# Patient Record
Sex: FEMALE | Race: WHITE | NOT HISPANIC OR LATINO | Employment: FULL TIME | URBAN - METROPOLITAN AREA
[De-identification: names, ages, dates, MRNs, and addresses within clinical notes are randomized per-mention and may not be internally consistent; named-entity substitution may affect disease eponyms.]

---

## 2017-01-08 ENCOUNTER — MYC REFILL (OUTPATIENT)
Dept: OBGYN | Facility: CLINIC | Age: 32
End: 2017-01-08

## 2017-01-08 DIAGNOSIS — E03.9 HYPOTHYROIDISM: ICD-10-CM

## 2017-01-09 RX ORDER — LEVOTHYROXINE SODIUM 50 UG/1
50 TABLET ORAL DAILY
Qty: 30 TABLET | Refills: 0 | Status: SHIPPED | OUTPATIENT
Start: 2017-01-09 | End: 2017-12-21

## 2017-01-09 NOTE — TELEPHONE ENCOUNTER
Message from VIDDIXhart:  Original authorizing provider: Estefani Robles MD    Sigrid Johnathan would like a refill of the following medications:  levothyroxine (SYNTHROID/LEVOTHROID) 50 MCG tablet [Estefani Robles MD]    Preferred pharmacy: [facility] [address] [place] [address] [address] AT [facility] [address]    Comment:  Hello! I ran out of my prescription for levothyroxine and Waleen contacted you on Saturday for a prescription request. If I could have this approved ASAP that would be much appreciated. I ran out of my prescription this morning. Thank you!.

## 2017-08-26 ENCOUNTER — HEALTH MAINTENANCE LETTER (OUTPATIENT)
Age: 32
End: 2017-08-26

## 2017-10-04 ENCOUNTER — OFFICE VISIT (OUTPATIENT)
Dept: URGENT CARE | Facility: URGENT CARE | Age: 32
End: 2017-10-04
Payer: COMMERCIAL

## 2017-10-04 VITALS
OXYGEN SATURATION: 99 % | TEMPERATURE: 101.3 F | WEIGHT: 168.06 LBS | HEART RATE: 101 BPM | DIASTOLIC BLOOD PRESSURE: 87 MMHG | SYSTOLIC BLOOD PRESSURE: 135 MMHG | BODY MASS INDEX: 28.4 KG/M2

## 2017-10-04 DIAGNOSIS — D68.9 CLOTTING DISORDER (H): ICD-10-CM

## 2017-10-04 DIAGNOSIS — D68.2 FACTOR II DEFICIENCY (H): ICD-10-CM

## 2017-10-04 DIAGNOSIS — R20.2 ARM PARESTHESIA, LEFT: ICD-10-CM

## 2017-10-04 DIAGNOSIS — M79.662 PAIN OF LEFT LOWER LEG: Primary | ICD-10-CM

## 2017-10-04 LAB
D DIMER PPP FEU-MCNC: 0.2 UG/ML FEU (ref 0–0.5)
ERYTHROCYTE [SEDIMENTATION RATE] IN BLOOD BY WESTERGREN METHOD: 10 MM/H (ref 0–20)

## 2017-10-04 PROCEDURE — 85379 FIBRIN DEGRADATION QUANT: CPT | Performed by: FAMILY MEDICINE

## 2017-10-04 PROCEDURE — 85652 RBC SED RATE AUTOMATED: CPT | Performed by: FAMILY MEDICINE

## 2017-10-04 PROCEDURE — 93000 ELECTROCARDIOGRAM COMPLETE: CPT | Performed by: FAMILY MEDICINE

## 2017-10-04 PROCEDURE — 36415 COLL VENOUS BLD VENIPUNCTURE: CPT | Performed by: FAMILY MEDICINE

## 2017-10-04 PROCEDURE — 99214 OFFICE O/P EST MOD 30 MIN: CPT | Performed by: FAMILY MEDICINE

## 2017-10-04 NOTE — NURSING NOTE
"Chief Complaint   Patient presents with     Tingling     tingling of left arm since yesterday aftenoon - tingling of left leg since Saturday night. Denies chest pain and currently no other symptoms.        Initial /87  Pulse 101  Temp 101.3  F (38.5  C) (Oral)  Wt 168 lb 1 oz (76.2 kg)  SpO2 99%  BMI 28.4 kg/m2 Estimated body mass index is 28.4 kg/(m^2) as calculated from the following:    Height as of 9/12/16: 5' 4.5\" (1.638 m).    Weight as of this encounter: 168 lb 1 oz (76.2 kg).  Medication Reconciliation: complete    "

## 2017-10-04 NOTE — MR AVS SNAPSHOT
After Visit Summary   10/4/2017    Sigrid Mann    MRN: [id number]           Patient Information     Date Of Birth          1985        Visit Information        Provider Department      10/4/2017 2:30 PM Jj Ponce,  [facility]        Today's Diagnoses     Pain of left lower leg    -  1    Arm paresthesia, left        Clotting disorder (H)        Factor II deficiency (H)           Follow-ups after your visit        Who to contact     If you have questions or need follow up information about today's clinic visit or your schedule please contact [facility] directly at [phone].  Normal or non-critical lab and imaging results will be communicated to you by Nextthart, letter or phone within 4 business days after the clinic has received the results. If you do not hear from us within 7 days, please contact the clinic through Nextthart or phone. If you have a critical or abnormal lab result, we will notify you by phone as soon as possible.  Submit refill requests through NetScaler or call your pharmacy and they will forward the refill request to us. Please allow 3 business days for your refill to be completed.          Additional Information About Your Visit        MyChart Information     NetScaler gives you secure access to your electronic health record. If you see a primary care provider, you can also send messages to your care team and make appointments. If you have questions, please call your primary care clinic.  If you do not have a primary care provider, please call [phone] and they will assist you.        Care EveryWhere ID     This is your Care EveryWhere ID. This could be used by other organizations to access your [facility] medical records  [id number]        Your Vitals Were     Pulse Temperature Pulse Oximetry BMI (Body Mass Index)          101 101.3  F (38.5  C) (Oral) 99% 28.4 kg/m2         Blood Pressure from Last 3 Encounters:    10/04/17 135/87   09/12/16 112/66   01/23/15 116/78    Weight from Last 3 Encounters:   10/04/17 168 lb 1 oz (76.2 kg)   09/12/16 170 lb (77.1 kg)   01/23/15 160 lb (72.6 kg)              We Performed the Following     D dimer quantitative     EKG 12-lead complete w/read - Clinics     ESR: Erythrocyte sedimentation rate          Today's Medication Changes          These changes are accurate as of: 10/4/17  4:23 PM.  If you have any questions, ask your nurse or doctor.               These medicines have changed or have updated prescriptions.        Dose/Directions    levothyroxine 50 MCG tablet   Commonly known as:  SYNTHROID/LEVOTHROID   This may have changed:  Another medication with the same name was removed. Continue taking this medication, and follow the directions you see here.   Used for:  Hypothyroidism   Changed by:  Estefani Robles MD        Dose:  50 mcg   Take 1 tablet (50 mcg) by mouth daily   Quantity:  30 tablet   Refills:  0                Primary Care Provider Office Phone # Fax #    Jj Ministerio Joseph [phone]       [address]  [facility] [address]        Equal Access to Services     [facility]: Hadii aad ku hadasho Soomaali, waaxda luqadaha, qaybta kaalmada adeegyada, waxay kanwalin haydouglas pacheco. So [facility] [phone].    ATENCIÓN: Si habla español, tiene a celis disposición servicios gratuitos de asistencia lingüística. Llame al [phone].    We comply with applicable federal civil rights laws and [place] laws. We do not discriminate on the basis of race, color, national origin, age, disability, sex, sexual orientation, or gender identity.            Thank you!     Thank you for choosing [facility]  for your care. Our goal is always to provide you with excellent care. Hearing back from our patients is one way we can continue to improve our services. Please take a few minutes to complete the written  survey that you may receive in the mail after your visit with us. Thank you!             Your Updated Medication List - Protect others around you: Learn how to safely use, store and throw away your medicines at www.disposemymeds.org.          This list is accurate as of: 10/4/17  4:23 PM.  Always use your most recent med list.                   Brand Name Dispense Instructions for use Diagnosis    levothyroxine 50 MCG tablet    SYNTHROID/LEVOTHROID    30 tablet    Take 1 tablet (50 mcg) by mouth daily    Hypothyroidism

## 2017-10-13 NOTE — PROGRESS NOTES
SUBJECTIVE: Sigrid Mann is a 32 year old female presenting with a chief complaint of left leg pain and left arm tingling.  Onset of symptoms was day(s) ago.  Course of illness is improving.    Severity moderate  Current and Associated symptoms: none  Treatment measures tried include None tried.  Predisposing factors include clotting disorder.    Past Medical History:   Diagnosis Date     Cervical dysplasia 2010    pt had LSILpap 12/10. colpo with 8 oclock bx showed just inflammation. another LSIL 3/11 with 12 oclock bx showed...     Herpes genitalis      HSV-1 infection      No Known Allergies  Social History   Substance Use Topics     Smoking status: Never Smoker     Smokeless tobacco: Never Used     Alcohol use 0.0 oz/week     0 Standard drinks or equivalent per week       ROS:  SKIN: no rash  GI: no vomiting    OBJECTIVE:  /87  Pulse 101  Temp 101.3  F (38.5  C) (Oral)  Wt 168 lb 1 oz (76.2 kg)  SpO2 99%  BMI 28.4 kg/[id number] APPEARANCE: healthy, alert and no distress  EYES: EOMI,  PERRL, conjunctiva clear  HENT: ear canals and TM's normal.  Nose and mouth without ulcers, erythema or lesions  NECK: supple, nontender, no lymphadenopathy  RESP: lungs clear to auscultation - no rales, rhonchi or wheezes  CV: regular rates and rhythm, normal S1 S2, no murmur noted  ABDOMEN:  soft, nontender, no HSM or masses and bowel sounds normal  NEURO: Normal strength and tone, sensory exam grossly normal,  normal speech and mentation  SKIN: no suspicious lesions or rashes      ICD-10-CM    1. Pain of left lower leg M79.662 D dimer quantitative     ESR: Erythrocyte sedimentation rate   2. Arm paresthesia, left R20.2 EKG 12-lead complete w/read - Clinics     ESR: Erythrocyte sedimentation rate   3. Clotting disorder (H) D68.9 D dimer quantitative     ESR: Erythrocyte sedimentation rate   4. Factor II deficiency (H) D68.2 D dimer quantitative     ESR: Erythrocyte sedimentation rate       Fluids/Rest, f/u if worse/not any  better

## 2017-11-13 DIAGNOSIS — E03.9 HYPOTHYROIDISM, UNSPECIFIED TYPE: ICD-10-CM

## 2017-11-13 RX ORDER — LEVOTHYROXINE SODIUM 50 UG/1
TABLET ORAL
Qty: 30 TABLET | Refills: 0 | Status: SHIPPED | OUTPATIENT
Start: 2017-11-13 | End: 2018-01-08

## 2017-11-13 NOTE — TELEPHONE ENCOUNTER
Levothyroxine 50mcg      Last Written Prescription Date:  19/17  Last Fill Quantity: 30,   # refills: 0  Last Office Visit with [facility] primary care provider:  9/12/16  Future Office visit: none    Medication is being filled for 1 time refill only due to:  Patient needs to be seen because it has been more than one year since last visit.   Pt due for annual, no appt scheduled. One month extension sent per [place] protocol.

## 2017-11-20 ENCOUNTER — TELEPHONE (OUTPATIENT)
Dept: NURSING | Facility: CLINIC | Age: 32
End: 2017-11-20

## 2017-11-20 DIAGNOSIS — D68.4 BLOOD CLOTTING FACTOR DEFICIENCY DISORDER (H): Primary | ICD-10-CM

## 2017-11-20 NOTE — TELEPHONE ENCOUNTER
What is a factor 2 gene mutation? A prothrombin 2 gene mutation as in higher risk for blood clots or a factor 2 in the blood clotting cascade?  We obviously always recommend folate, even pre preg. I would say to for sure do at least a PNV and to make sure she's getting 1000mcg of folate. The pnv will have 800mcg and then can get additional 400mcg and take that for 1200mcg. Who told her about folate? Was it us? Heme? Mfm? Where was the factor testing done? When?  Trying to find out what exactly she has

## 2017-11-20 NOTE — TELEPHONE ENCOUNTER
LMP 10/9/17, positive UPT 2 days ago. Pt has factor 2 gene mutation, [id number]. Pt was told she needed to start folic acid supplement if she became pregnant. Routing to Dr. Robles. Please advise.

## 2017-11-21 NOTE — TELEPHONE ENCOUNTER
Pt had lab work done on 3/1/13 through this office.   Negative for Factor 5 Leiden mutation  Positive for Prothrombin (Factor II) Pt has a 2-4 fold increase in the risk of venous thrombosis.     Tried to reach pt. Not able to . Will try later.

## 2017-11-21 NOTE — TELEPHONE ENCOUNTER
Ok so based on my research she probably could benefit from 4000mcg of folate rather than the 1000mcg we recommend. Some say that MTHFR mutations make absorption of folic acid harder than a different type but by taking more this should compensate.    In terms of blood clotting risk in pregnancy is there a record if she's heterozygous for prothrombin 2 or double heterozygous? No access to kim. If she's just heterozygous she doesn't need any lovenox and can just be monitored if she's never had a blood clot before. Has she? Don't recall that she has. If she is double hetero than regardless of her VTE risk we need to start lovenox. Could you work on getting me that info and let me know.    We should send a referral to [facility] to discuss some of this. B/c of the MTHFR and the prothrombin they may bump up her baseline risk and rec. lovenox but each one individually doesn't need lovenox and I'd like them to make that decision. She should still do a NOB with us but let's have her see them around that same time.

## 2017-11-21 NOTE — TELEPHONE ENCOUNTER
[facility] states   Prothrombin (Factor II) 24212AF. Mutation : Positive for one copy of the [id number] mutation.  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 Heterozygous has a 2-4 fold increase in the risk of venous thrombosis. Presence of the mutation is associated with an elevation of prothrombin levels to about 30% above normal in heterozygous.   [facility] referral entered. Should copy of lab work be scanned into epic or sent to [facility] for reference?  Rx for Folate entered. Need to know pharmacy. LMTCB to discuss if had blood clot before

## 2017-11-21 NOTE — TELEPHONE ENCOUNTER
Pt informed.   Pt had lab work done on 3/1/13 through this office.   Negative for Factor 5 Leiden mutation  Methylenetetrahydrofolate Reductase DNA: Positive for two copies of the [id number] mutation  Positive for Prothrombin (Factor II) Pt has a 2-4 fold increase in the risk of venous thrombosis. Routing to Dr. Margaret STROUD

## 2017-11-21 NOTE — TELEPHONE ENCOUNTER
Reason for Call:  Other call back    Detailed comments: Patient returning Triage phone call    Phone Number Patient can be reached at: Cell number on file:    Telephone Information:   Mobile [phone]       Best Time: today    Can we leave a detailed message on this number? YES    Call taken on 11/21/2017 at 10:12 AM by Corine Avilez

## 2017-11-22 ENCOUNTER — TELEPHONE (OUTPATIENT)
Dept: MATERNAL FETAL MEDICINE | Facility: CLINIC | Age: 32
End: 2017-11-22

## 2017-11-22 RX ORDER — FOLIC ACID 1 MG/1
4000 TABLET ORAL DAILY
Qty: 100 TABLET | Refills: 3 | Status: SHIPPED | OUTPATIENT
Start: 2017-11-22 | End: 2018-03-28

## 2017-11-22 NOTE — TELEPHONE ENCOUNTER
Pt returned call. Pt informed of message below. Pt states she has not had blood clots. Pt was seen in urgent care  on 10/4/17 for Left leg and Left arm numbness. After EKG and labs was told it was pinched nerve. No other episodes of numbness. Pt also stated that last couple of cycles were very heavy with clots the size of silver dollars. Passed about 3-5 per cycle. LMP 10/9/17. Pt has 1st OB on 12/11/17 with Kalie. MFM referral sent. Rx for folate sent. Dr. Robles appointment on 1/8/18. Routing to Dr. Robles. Please review.

## 2017-11-22 NOTE — TELEPHONE ENCOUNTER
Yes, please send copy of blood work to [facility] so they have it and please actually print it to scan into her epic chart so I have it on file.  Since she's hetero w/ no clots she's considered low risk and may just need a baby asa if anything at all but will let M determine that for sure.  Normally I would say this isn't an approp NOB for Kalie given the high risk condition. However if we can get the M consult as soon as viability confirmed then I'm ok leaving it as is. Can we just make sure the appointment gets scheduled as soon as possible after her NOB so it doesn't fall through the cracks?  thx

## 2017-11-22 NOTE — TELEPHONE ENCOUNTER
Copy of blood work sent to [facility] with referral copy. Copy sent to be scanned to lab tab per Dr. Robles.

## 2017-11-22 NOTE — TELEPHONE ENCOUNTER
Phone call to pt re need for labs and records. Pt states she was seen about 4 years ago at her PCP and had labs done for Factor 2 and Prothrombin mutations due to a family hx of. Pt states she has not had any clots nor has she had any family members that have had clots. Call made to pt Clinic to obtain records from 3/2013 labs that we done. Clinic states they will fax. Pt aware MFM will call to schedule her once we have all of the records we are looking for. States her first OBV is not until Dec. With Dr. Robles. No further questions at this time. Shellie Hemphill RN

## 2017-12-14 ENCOUNTER — PRENATAL OFFICE VISIT (OUTPATIENT)
Dept: OBGYN | Facility: CLINIC | Age: 32
End: 2017-12-14
Payer: COMMERCIAL

## 2017-12-14 ENCOUNTER — RADIANT APPOINTMENT (OUTPATIENT)
Dept: ULTRASOUND IMAGING | Facility: CLINIC | Age: 32
End: 2017-12-14
Payer: COMMERCIAL

## 2017-12-14 VITALS
WEIGHT: 173.2 LBS | HEART RATE: 93 BPM | HEIGHT: 64 IN | DIASTOLIC BLOOD PRESSURE: 73 MMHG | SYSTOLIC BLOOD PRESSURE: 116 MMHG | BODY MASS INDEX: 29.57 KG/M2

## 2017-12-14 DIAGNOSIS — Z34.01 ENCOUNTER FOR SUPERVISION OF NORMAL FIRST PREGNANCY IN FIRST TRIMESTER: Primary | ICD-10-CM

## 2017-12-14 DIAGNOSIS — Z34.91 VIABLE PREGNANCY IN FIRST TRIMESTER: ICD-10-CM

## 2017-12-14 PROBLEM — Z34.00 SUPERVISION OF NORMAL IUP (INTRAUTERINE PREGNANCY) IN PRIMIGRAVIDA: Status: ACTIVE | Noted: 2017-12-14

## 2017-12-14 LAB
ALBUMIN UR-MCNC: NEGATIVE MG/DL
APPEARANCE UR: CLEAR
BACTERIA #/AREA URNS HPF: ABNORMAL /HPF
BILIRUB UR QL STRIP: NEGATIVE
COLOR UR AUTO: YELLOW
GLUCOSE UR STRIP-MCNC: NEGATIVE MG/DL
HGB UR QL STRIP: NEGATIVE
KETONES UR STRIP-MCNC: ABNORMAL MG/DL
LEUKOCYTE ESTERASE UR QL STRIP: NEGATIVE
MUCOUS THREADS #/AREA URNS LPF: PRESENT /LPF
NITRATE UR QL: NEGATIVE
NON-SQ EPI CELLS #/AREA URNS LPF: ABNORMAL /LPF
PH UR STRIP: 6 PH (ref 5–7)
RBC #/AREA URNS AUTO: ABNORMAL /HPF
SOURCE: ABNORMAL
SP GR UR STRIP: 1.02 (ref 1–1.03)
UROBILINOGEN UR STRIP-ACNC: 0.2 EU/DL (ref 0.2–1)
WBC #/AREA URNS AUTO: ABNORMAL /HPF

## 2017-12-14 PROCEDURE — 99207 ZZC FIRST OB VISIT: CPT | Performed by: NURSE PRACTITIONER

## 2017-12-14 PROCEDURE — 76817 TRANSVAGINAL US OBSTETRIC: CPT | Performed by: OBSTETRICS & GYNECOLOGY

## 2017-12-14 PROCEDURE — 81001 URINALYSIS AUTO W/SCOPE: CPT | Performed by: NURSE PRACTITIONER

## 2017-12-14 PROCEDURE — 87086 URINE CULTURE/COLONY COUNT: CPT | Performed by: NURSE PRACTITIONER

## 2017-12-14 ASSESSMENT — PATIENT HEALTH QUESTIONNAIRE - PHQ9
5. POOR APPETITE OR OVEREATING: NOT AT ALL
SUM OF ALL RESPONSES TO PHQ QUESTIONS 1-9: 5

## 2017-12-14 ASSESSMENT — ANXIETY QUESTIONNAIRES
1. FEELING NERVOUS, ANXIOUS, OR ON EDGE: SEVERAL DAYS
5. BEING SO RESTLESS THAT IT IS HARD TO SIT STILL: NOT AT ALL
7. FEELING AFRAID AS IF SOMETHING AWFUL MIGHT HAPPEN: SEVERAL DAYS
GAD7 TOTAL SCORE: 5
3. WORRYING TOO MUCH ABOUT DIFFERENT THINGS: SEVERAL DAYS
6. BECOMING EASILY ANNOYED OR IRRITABLE: SEVERAL DAYS
2. NOT BEING ABLE TO STOP OR CONTROL WORRYING: SEVERAL DAYS

## 2017-12-14 NOTE — PROGRESS NOTES
This is a 32 year old female patient,   who presents for her first obstetrical visit.    Patient's last menstrual period was 10/09/2017..  This gives her an EDC of 2018 .  She is 9w3d weeks.  Her cycles are regular.  Her last menstrual period was normal.  She has had an ultrasound on Dec 15,2017 which showed 8w1d. .  Since her LMP, she has experienced  nausea, fatigue and breast tenderness).  She denies abdominal pain, headache, loss of appetite, vaginal discharge, dysuria, pelvic pain, urinary urgency, lightheadedness, urinary frequency, vaginal bleeding, hemorrhoids and constipation.        Past History:  Her past medical history   Past Medical History:   Diagnosis Date     Cervical dysplasia     pt had LSILpap 12/10. colpo with 8 oclock bx showed just inflammation. another LSIL 3/11 with 12 oclock bx showed...     Herpes genitalis      HSV-1 infection    .      This is her first pregnancy    Since her last LMP she denies use of alcohol, tobacco and street drugs.    HISTORY:  Obstetric History       T0      L0     SAB0   TAB0   Ectopic0   Multiple0   Live Births0       # Outcome Date GA Lbr Arnie/2nd Weight Sex Delivery Anes PTL Lv   1 Current                 Past Medical History:   Diagnosis Date     Cervical dysplasia     pt had LSILpap 12/10. colpo with 8 oclock bx showed just inflammation. another LSIL 3/11 with 12 oclock bx showed...     Herpes genitalis      HSV-1 infection      Past Surgical History:   Procedure Laterality Date     CYSTOSCOPY      cystoscopy, normal     Family History   Problem Relation Age of Onset     Breast Cancer Paternal Aunt      CEREBROVASCULAR DISEASE Paternal Grandmother      Hyperlipidemia Father      Hypertension Father 60     Coronary Artery Disease Father 60     summer 2014     Social History     Social History     Marital status: Single     Spouse name: Flaco     Number of children: 0     Years of education: N/A     Occupational  "History      [employer]     since 2/12 working for [employer]. 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     Social History Main Topics     Smoking status: Never Smoker     Smokeless tobacco: Never Used     Alcohol use No      Comment: none since knew pg     Drug use: No     Sexual activity: Yes     Partners: Male     Birth control/ protection: Condom     Other Topics Concern     None     Social History Narrative     Current Outpatient Prescriptions   Medication Sig     folic acid (FOLVITE) 1 MG tablet Take 4 tablets (4,000 mcg) by mouth daily     levothyroxine (SYNTHROID/LEVOTHROID) 50 MCG tablet Take 1 tablet (50 mcg) by mouth daily     levothyroxine (SYNTHROID/LEVOTHROID) 50 MCG tablet TAKE 1 TABLET(50 MCG) BY MOUTH DAILY     No current facility-administered medications for this visit.      No Known Allergies    Past medical, surgical, social and family history were reviewed and updated in EPIC.    ROS:   12 point review of systems negative other than symptoms noted below.  Constitutional: Fatigue and breast tenderness  Gastrointestinal: Nausea    EXAM:  /73  Pulse 93  Ht 5' 4\" (1.626 m)  Wt 173 lb 3.2 oz (78.6 kg)  LMP 10/09/2017  BMI 29.73 kg/m2   BMI: Body mass index is 29.73 kg/(m^2).    EXAM:  Constitutional:  Appearance: Well nourished, well developed alert, in no acute distress.  Neck:   Lymph Nodes:  No lymphadenopathy present.    Thyroid:  Gland size normal, nontender, no nodules or masses present  on palpation.  Chest:  Respiratory Effort:  Breathing unlabored.  Cardiovascular:  Heart Auscultation:  Regular rate, normal rhythm, no murmurs    present.  Breasts: Deferred.    Axillary Lymph Nodes:  No lymphadenopathy present.  Gastrointestinal:  Abdominal Examination:  Abdomen nontender to palpation, tone  normal without rigidity or guarding, no masses present, umbilicus without  Lesions.    Liver and speen:  No hepatomegaly present, liver " nontender to  palpation.    Hernias:  No hernias present.  Lymphatic: Lymph Nodes:  No other lymphadenopathy present.  Skin:  General Inspection:  No rashes present, no lesions present, no areas of  discoloration.    Genitalia and Groin:  No rashes present, no lesions present, no areas of  discoloration, no masses present.  Neurologic/Psychiatric:    Mental Status:  Oriented X3.    Pelvic Exam:  External Genitalia:     Normal appearance for age, no discharge present, no tenderness present, no inflammatory lesions present, color normal  Vagina:     Normal vaginal vault without central or paravaginal defects, no discharge present, no inflammatory lesions present, no masses present  Bladder:     Nontender to palpation  Urethra:   Urethral Body:  Urethra palpation normal, urethra structural support normal   Urethral Meatus:  No erythema or lesions present  Cervix:     Appearance healthy, no lesions present, nontender to palpation, no bleeding present  Uterus:     Uterus: firm, normal sized and nontender, anteverted in position.   Adnexa:     No adnexal tenderness present, no adnexal masses present  Perineum:     Perineum within normal limits, no evidence of trauma, no rashes or skin lesions present  Anus:     Anus within normal limits, no hemorrhoids present  Inguinal Lymph Nodes:     No lymphadenopathy present  Pubic Hair:     Normal pubic hair distribution for age  Genitalia and Groin:     No rashes present, no lesions present, no areas of discoloration, no masses present      ASSESSMENT:    No diagnosis found.    PLAN/PATIENT INSTRUCTIONS:    There are no Patient Instructions on file for this visit.    8 week size normal pregnancy.   Discussed diet and fatigue.  Eating 6 small meals a day and that helps.  Will return in 3 weeks for ob visit and will probably do genetic testing at that time.    MEI De La Rosa CNP

## 2017-12-14 NOTE — MR AVS SNAPSHOT
After Visit Summary   12/14/2017    Sigrid Mann    MRN: [id number]           Patient Information     Date Of Birth          1985        Visit Information        Provider Department      12/14/2017 2:00 PM Louann Hunter APRN CNP; [facility] [place]        Today's Diagnoses     Encounter for supervision of normal first pregnancy in first trimester    -  1       Follow-ups after your visit        Follow-up notes from your care team     Return in about 4 weeks (around 1/11/2018) for ob visit.      Your next 10 appointments already scheduled     Jan 08, 2018  8:50 AM CST   ESTABLISHED PRENATAL with Estefani Robles MD   [facility] [place] ([facility] [place])    [address]  [place] [address]   [phone]            Feb 05, 2018  8:40 AM CST   ESTABLISHED PRENATAL with Estefani Robles MD   [facility] [place] ([facility] [place])    [address] [place] [address] [address]  [place] [address]   [phone]            Mar 05, 2018  8:00 AM CST   US PELVIC COMPLETE W TRANSVAGINAL with WEUS1   [facility] [place] ([facility] [place])    [address]  [place] [address]   [phone]           Please bring a list of your medicines (including vitamins, minerals and over-the-counter drugs). Also, tell your doctor about any allergies you may have. Wear comfortable clothes and leave your valuables at home.  Adults: Drink six 8-ounce glasses of fluid one hour before your exam. Do NOT empty your bladder.  If you need to empty your bladder before your exam, try to release only a little bit of urine. Then, drink another 8oz glass of fluid.  Children: Children who are potty trained should drink at least 4 cups (32 oz) of liquid 45 minutes to one hour prior to the exam. The child s bladder must be full in order to achieve a diagnostic exam. If your child is very  uncomfortable or has an urgent need to pee, please notify a technologist; they will try to find out how much longer the wait may be and provide instructions to help relieve the pressure. Occasionally it is medically necessary to insert a urinary catheter to fill the bladder.  Please call the Imaging Department at your exam site with any questions.            Mar 05, 2018  8:40 AM CST   ESTABLISHED PRENATAL with Estefani Robles MD   [facility] [place] ([facility] [place])    [address] [place] [address] [phone]              Who to contact     If you have questions or need follow up information about today's clinic visit or your schedule please contact [facility] [place] directly at [phone].  Normal or non-critical lab and imaging results will be communicated to you by MyChart, letter or phone within 4 business days after the clinic has received the results. If you do not hear from us within 7 days, please contact the clinic through MyChart or phone. If you have a critical or abnormal lab result, we will notify you by phone as soon as possible.  Submit refill requests through [employer] or call your pharmacy and they will forward the refill request to us. Please allow 3 business days for your refill to be completed.          Additional Information About Your Visit        [employer] Information     [employer] gives you secure access to your electronic health record. If you see a primary care provider, you can also send messages to your care team and make appointments. If you have questions, please call your primary care clinic.  If you do not have a primary care provider, please call [phone] and they will assist you.        Care EveryWhere ID     This is your Care EveryWhere ID. This could be used by other organizations to access your [place] medical records  [id number]        Your Vitals Were     Pulse Height Last Period BMI (Body Mass Index)  "         93 5' 4\" (1.626 m) 10/09/2017 29.73 kg/m2         Blood Pressure from Last 3 Encounters:   12/14/17 116/73   10/04/17 135/87   09/12/16 112/66    Weight from Last 3 Encounters:   12/14/17 173 lb 3.2 oz (78.6 kg)   10/04/17 168 lb 1 oz (76.2 kg)   09/12/16 170 lb (77.1 kg)              We Performed the Following     ABO/Rh type and screen     Anti Treponema     CBC with platelets differential     Hepatitis B surface antigen     HIV Antigen Antibody Combo     Rubella Antibody IgG Quantitative     UA with Microscopic     Urine Culture Aerobic Bacterial        Primary Care Provider Office Phone # Fax #    Jj Joseph [phone]       [address] [place] [address] [place] [id number]        Equal Access to Services     [facility] Hospital[facility] : Hadii aad ku hadasho Soomaali, waaxda luqadaha, qaybta kaalmada adeegyada, whitney schofieldin hayaan arun romero . So [facility] [phone].    ATENCIÓN: Si habla español, tiene a celis disposición servicios gratuitos de asistencia lingüística. Blas al [phone].    We comply with applicable federal civil rights laws and [place] laws. We do not discriminate on the basis of race, color, national origin, age, disability, sex, sexual orientation, or gender identity.            Thank you!     Thank you for choosing [facility] [place]  for your care. Our goal is always to provide you with excellent care. Hearing back from our patients is one way we can continue to improve our services. Please take a few minutes to complete the written survey that you may receive in the mail after your visit with us. Thank you!             Your Updated Medication List - Protect others around you: Learn how to safely use, store and throw away your medicines at www.disposemymeds.org.          This list is accurate as of: 12/14/17  3:17 PM.  Always use your most recent med list.                   Brand Name Dispense Instructions for use Diagnosis    " folic acid 1 MG tablet    FOLVITE    100 tablet    Take 4 tablets (4,000 mcg) by mouth daily    Blood clotting factor deficiency disorder (H)       * levothyroxine 50 MCG tablet    SYNTHROID/LEVOTHROID    30 tablet    Take 1 tablet (50 mcg) by mouth daily    Hypothyroidism       * levothyroxine 50 MCG tablet    SYNTHROID/LEVOTHROID    30 tablet    TAKE 1 TABLET(50 MCG) BY MOUTH DAILY    Hypothyroidism, unspecified type       * Notice:  This list has 2 medication(s) that are the same as other medications prescribed for you. Read the directions carefully, and ask your doctor or other care provider to review them with you.

## 2017-12-14 NOTE — NURSING NOTE
[facility] Obstetrical Risk History    Patient presents for new OB labs and teaching.      1. Please indicate any condition you have or have had in the past:  Thyroid Disorder  2. Herpes  3. Abnormal Pap  4. Do you smoke?  No  If yes, how many packs/day?   5. Do you drink alcoholic beverages? No  If yes, how often?  What type?   6. List any medications taken since your last period: synthroid, folic, prenatal  7. List any recreational drugs (cocaine, marijuana, etc. used since your last period:None    8. List any chemical or radiation exposure that you've encountered: None  9. Are you on a restricted diet? No  If yes, please describe:  Do you have any Jainism objections to any form of treatment? No    GENETIC SCREENING    These questions apply to you, the baby's father, or anyone in either family with:    1. Patient's age 35 or greater at delivery? No  2. Greenlandic, Malay, Mediterranean ancestry? No  3. Neural Tube Defect (Meningomyelocele, Spina Bifida, or Anencephaly)? No  4. Buddhism, Gabonese [place] Islander or history of Matt-Sachs disease? No  5. Down's Syndrome?   No  6. Hemophilia or clotting disorder? Yes  Factor 2  7. Muscular Dystrophy? No  8. Cystic Fibrosis? No  9. Krishna's Chorea? No  10. Mental Retardation? No  11. 3 or more miscarriages or a stillborn? No  12. Other inherited disease or chromosomal disorder? Yes  13. Have you or the baby's father had a child born with a birth defect? No  14. Did you or the baby's father have a birth defect yourselves? No    Do you have any other concerns about birth defects? No

## 2017-12-15 LAB
BACTERIA SPEC CULT: NO GROWTH
Lab: NORMAL
SPECIMEN SOURCE: NORMAL

## 2017-12-15 ASSESSMENT — ANXIETY QUESTIONNAIRES: GAD7 TOTAL SCORE: 5

## 2017-12-21 ENCOUNTER — MYC REFILL (OUTPATIENT)
Dept: OBGYN | Facility: CLINIC | Age: 32
End: 2017-12-21

## 2017-12-21 DIAGNOSIS — E03.9 HYPOTHYROIDISM: ICD-10-CM

## 2017-12-21 RX ORDER — LEVOTHYROXINE SODIUM 50 UG/1
50 TABLET ORAL DAILY
Qty: 30 TABLET | Refills: 0 | Status: SHIPPED | OUTPATIENT
Start: 2017-12-21 | End: 2018-01-08

## 2017-12-21 NOTE — TELEPHONE ENCOUNTER
levothyroxine (SYNTHROID/LEVOTHROID) 50 MCG tablet   Last Written Prescription Date:  11/13/17  Last Fill Quantity: 30,   # refills: 0  Last Office Visit with [facility] primary care provider:  12/14/17  Future Office visit: 1/8/18    Routing refill request to provider for review/approval because:  Refill sent for one month extension

## 2017-12-21 NOTE — TELEPHONE ENCOUNTER
Message from [employer]:  Original authorizing provider: Estefani Robles MD    [facility] would like a refill of the following medications:  levothyroxine (SYNTHROID/LEVOTHROID) 50 MCG tablet [Estefani Robles MD]    Preferred pharmacy: [facility] [address] [place], [place] [id number] [facility], located within [facility]    Comment:  Hello, I came in on 12/14 for my first ultrasound. I was told prior to this that the next time I came in I would get a prescription refill request for my thyroid medication levothyroxine. I didn't get to see doctor Margaret and I forgot to mention to the doc I saw that I needed a refill. I have about 3-4 days left on my medication. Is it possible to get a request for a refill soon? This would be really helpful. Thank you!

## 2017-12-30 ENCOUNTER — HEALTH MAINTENANCE LETTER (OUTPATIENT)
Age: 32
End: 2017-12-30

## 2018-01-08 ENCOUNTER — PRENATAL OFFICE VISIT (OUTPATIENT)
Dept: OBGYN | Facility: CLINIC | Age: 33
End: 2018-01-08
Payer: COMMERCIAL

## 2018-01-08 ENCOUNTER — TRANSFERRED RECORDS (OUTPATIENT)
Dept: HEALTH INFORMATION MANAGEMENT | Facility: CLINIC | Age: 33
End: 2018-01-08

## 2018-01-08 VITALS — DIASTOLIC BLOOD PRESSURE: 68 MMHG | BODY MASS INDEX: 30.21 KG/M2 | WEIGHT: 176 LBS | SYSTOLIC BLOOD PRESSURE: 114 MMHG

## 2018-01-08 DIAGNOSIS — O09.91 SUPERVISION OF HIGH RISK PREGNANCY IN FIRST TRIMESTER: Primary | ICD-10-CM

## 2018-01-08 DIAGNOSIS — E03.9 HYPOTHYROIDISM, UNSPECIFIED TYPE: ICD-10-CM

## 2018-01-08 DIAGNOSIS — B00.1 COLD SORE: ICD-10-CM

## 2018-01-08 DIAGNOSIS — O99.280 HYPOTHYROID IN PREGNANCY, ANTEPARTUM: ICD-10-CM

## 2018-01-08 DIAGNOSIS — E03.9 HYPOTHYROID IN PREGNANCY, ANTEPARTUM: ICD-10-CM

## 2018-01-08 DIAGNOSIS — D68.52 PROTHROMBIN GENE MUTATION (H): ICD-10-CM

## 2018-01-08 DIAGNOSIS — A60.04 HERPES SIMPLEX VULVOVAGINITIS: ICD-10-CM

## 2018-01-08 PROBLEM — O09.90 SUPERVISION OF HIGH RISK PREGNANCY, ANTEPARTUM: Status: RESOLVED | Noted: 2018-01-08 | Resolved: 2018-01-08

## 2018-01-08 PROBLEM — Z34.00 SUPERVISION OF NORMAL IUP (INTRAUTERINE PREGNANCY) IN PRIMIGRAVIDA: Status: RESOLVED | Noted: 2017-12-14 | Resolved: 2018-01-08

## 2018-01-08 PROBLEM — O09.90 SUPERVISION OF HIGH RISK PREGNANCY, ANTEPARTUM: Status: ACTIVE | Noted: 2018-01-08

## 2018-01-08 LAB
ABO + RH BLD: NORMAL
ABO + RH BLD: NORMAL
BASOPHILS # BLD AUTO: 0 10E9/L (ref 0–0.2)
BASOPHILS NFR BLD AUTO: 0.2 %
BLD GP AB SCN SERPL QL: NORMAL
BLOOD BANK CMNT PATIENT-IMP: NORMAL
DIFFERENTIAL METHOD BLD: NORMAL
EOSINOPHIL # BLD AUTO: 0.1 10E9/L (ref 0–0.7)
EOSINOPHIL NFR BLD AUTO: 0.6 %
ERYTHROCYTE [DISTWIDTH] IN BLOOD BY AUTOMATED COUNT: 11.6 % (ref 10–15)
HBV SURFACE AG SERPL QL IA: NONREACTIVE
HCT VFR BLD AUTO: 39.6 % (ref 35–47)
HGB BLD-MCNC: 13.8 G/DL (ref 11.7–15.7)
HIV 1+2 AB+HIV1 P24 AG SERPL QL IA: NONREACTIVE
LYMPHOCYTES # BLD AUTO: 1.5 10E9/L (ref 0.8–5.3)
LYMPHOCYTES NFR BLD AUTO: 15 %
MCH RBC QN AUTO: 30.2 PG (ref 26.5–33)
MCHC RBC AUTO-ENTMCNC: 34.8 G/DL (ref 31.5–36.5)
MCV RBC AUTO: 87 FL (ref 78–100)
MONOCYTES # BLD AUTO: 0.7 10E9/L (ref 0–1.3)
MONOCYTES NFR BLD AUTO: 6.3 %
NEUTROPHILS # BLD AUTO: 8 10E9/L (ref 1.6–8.3)
NEUTROPHILS NFR BLD AUTO: 77.9 %
PLATELET # BLD AUTO: 308 10E9/L (ref 150–450)
RBC # BLD AUTO: 4.57 10E12/L (ref 3.8–5.2)
SPECIMEN EXP DATE BLD: NORMAL
TSH SERPL DL<=0.005 MIU/L-ACNC: 4.08 MU/L (ref 0.4–4)
WBC # BLD AUTO: 10.3 10E9/L (ref 4–11)

## 2018-01-08 PROCEDURE — 86780 TREPONEMA PALLIDUM: CPT | Performed by: OBSTETRICS & GYNECOLOGY

## 2018-01-08 PROCEDURE — 36415 COLL VENOUS BLD VENIPUNCTURE: CPT | Performed by: OBSTETRICS & GYNECOLOGY

## 2018-01-08 PROCEDURE — 86900 BLOOD TYPING SEROLOGIC ABO: CPT | Performed by: NURSE PRACTITIONER

## 2018-01-08 PROCEDURE — 86901 BLOOD TYPING SEROLOGIC RH(D): CPT | Performed by: NURSE PRACTITIONER

## 2018-01-08 PROCEDURE — 99000 SPECIMEN HANDLING OFFICE-LAB: CPT | Performed by: OBSTETRICS & GYNECOLOGY

## 2018-01-08 PROCEDURE — 86850 RBC ANTIBODY SCREEN: CPT | Performed by: NURSE PRACTITIONER

## 2018-01-08 PROCEDURE — 40000791 ZZHCL STATISTIC VERIFI PRENATAL TRISOMY 21,18,13: Mod: 90 | Performed by: OBSTETRICS & GYNECOLOGY

## 2018-01-08 PROCEDURE — 84443 ASSAY THYROID STIM HORMONE: CPT | Performed by: OBSTETRICS & GYNECOLOGY

## 2018-01-08 PROCEDURE — 86762 RUBELLA ANTIBODY: CPT | Performed by: OBSTETRICS & GYNECOLOGY

## 2018-01-08 PROCEDURE — 85025 COMPLETE CBC W/AUTO DIFF WBC: CPT | Performed by: OBSTETRICS & GYNECOLOGY

## 2018-01-08 PROCEDURE — 87340 HEPATITIS B SURFACE AG IA: CPT | Performed by: OBSTETRICS & GYNECOLOGY

## 2018-01-08 PROCEDURE — 99207 ZZC PRENATAL VISIT: CPT | Performed by: OBSTETRICS & GYNECOLOGY

## 2018-01-08 PROCEDURE — 87389 HIV-1 AG W/HIV-1&-2 AB AG IA: CPT | Performed by: OBSTETRICS & GYNECOLOGY

## 2018-01-08 RX ORDER — LEVOTHYROXINE SODIUM 75 UG/1
75 TABLET ORAL DAILY
Qty: 30 TABLET | Refills: 0 | Status: SHIPPED | OUTPATIENT
Start: 2018-01-08 | End: 2018-02-06

## 2018-01-08 NOTE — MR AVS SNAPSHOT
After Visit Summary   1/8/2018    Sigrid Mann    MRN: [id number]           Patient Information     Date Of Birth          1985        Visit Information        Provider Department      1/8/2018 8:50 AM Estefani Robles MD [facility] [place]        Today's Diagnoses     Supervision of high risk pregnancy in first trimester    -  1    Hypothyroid in pregnancy, antepartum        Herpes simplex vulvovaginitis        Prothrombin gene mutation (H)        Cold sore           Follow-ups after your visit        Your next 10 appointments already scheduled     Feb 05, 2018  8:40 AM CST   ESTABLISHED PRENATAL with Estefani Robles MD   [facility] [place] ([facility] [place])    [address]  [facility] [address]   [phone]            Mar 05, 2018  8:00 AM CST   US PELVIC COMPLETE W TRANSVAGINAL with WEUS1   [facility] [place] ([facility] [place])    [address]  [facility] [address]   [phone]           Please bring a list of your medicines (including vitamins, minerals and over-the-counter drugs). Also, tell your doctor about any allergies you may have. Wear comfortable clothes and leave your valuables at home.  Adults: Drink six 8-ounce glasses of fluid one hour before your exam. Do NOT empty your bladder.  If you need to empty your bladder before your exam, try to release only a little bit of urine. Then, drink another 8oz glass of fluid.  Children: Children who are potty trained should drink at least 4 cups (32 oz) of liquid 45 minutes to one hour prior to the exam. The child s bladder must be full in order to achieve a diagnostic exam. If your child is very uncomfortable or has an urgent need to pee, please notify a technologist; they will try to find out how much longer the wait may be and provide instructions to help relieve the pressure. Occasionally it is medically necessary to insert a  urinary catheter to fill the bladder.  Please call the Imaging Department at your exam site with any questions.            Mar 05, 2018  8:40 AM CST   ESTABLISHED PRENATAL with Estefani Robles MD   [facility] ([facility] [place]    [address] [place] [address]  [place] [address] [phone]              Who to contact     If you have questions or need follow up information about today's clinic visit or your schedule please contact [facility] [place] directly at [phone].  Normal or non-critical lab and imaging results will be communicated to you by [employer], letter or phone within 4 business days after the clinic has received the results. If you do not hear from us within 7 days, please contact the clinic through [facility] [employer] or phone. If you have a critical or abnormal lab result, we will notify you by phone as soon as possible.  Submit refill requests through [employer] or call your pharmacy and they will forward the refill request to us. Please allow 3 business days for your refill to be completed.          Additional Information About Your Visit        [employer] Information     [employer] gives you secure access to your electronic health record. If you see a primary care provider, you can also send messages to your care team and make appointments. If you have questions, please call your primary care clinic.  If you do not have a primary care provider, please call [phone] and they will assist you.        Care EveryWhere ID     This is your Care EveryWhere ID. This could be used by other organizations to access your [place] medical records  [id number]        Your Vitals Were     Last Period BMI (Body Mass Index)                10/09/2017 30.21 kg/m2           Blood Pressure from Last 3 Encounters:   01/08/18 114/68   12/14/17 116/73   10/04/17 135/87    Weight from Last 3 Encounters:   01/08/18 176 lb (79.8 kg)   12/14/17 173 lb 3.2 oz (78.6 kg)   10/04/17  168 lb 1 oz (76.2 kg)              We Performed the Following     ABO/Rh type and screen     Anti Treponema     CBC with platelets and differential     Hepatitis B surface antigen     HIV Antigen Antibody Combo     Non Invasive Prenatal Test Cell Free DNA     Rubella Antibody IgG Quantitative     TSH        Primary Care Provider Office Phone # Fax #    Jj Joseph [phone]       [address]  [facility] [address]        Equal Access to Services     [employer]OP[employer] : Hadii aad ku hadasho Soomaali, waaxda luqadaha, qaybta kaalmada adeegyada, waxay idiin hayaan adeeg kharash la'douglas . So [facility] [phone].    ATENCIÓN: Si ezra perales, tiene a celis disposición servicios gratuitos de asistencia lingüística. [facility] [phone].    We comply with applicable federal civil rights laws and [place] laws. We do not discriminate on the basis of race, color, national origin, age, disability, sex, sexual orientation, or gender identity.            Thank you!     Thank you for choosing [facility] [place]  for your care. Our goal is always to provide you with excellent care. Hearing back from our patients is one way we can continue to improve our services. Please take a few minutes to complete the written survey that you may receive in the mail after your visit with us. Thank you!             Your Updated Medication List - Protect others around you: Learn how to safely use, store and throw away your medicines at www.disposemymeds.org.          This list is accurate as of: 1/8/18 12:22 PM.  Always use your most recent med list.                   Brand Name Dispense Instructions for use Diagnosis    folic acid 1 MG tablet    FOLVITE    100 tablet    Take 4 tablets (4,000 mcg) by mouth daily    Blood clotting factor deficiency disorder (H)       levothyroxine 50 MCG tablet    SYNTHROID/LEVOTHROID    30 tablet    TAKE 1 TABLET(50 MCG) BY MOUTH DAILY     Hypothyroidism, unspecified type

## 2018-01-08 NOTE — PROGRESS NOTES
Unplanned pregnancy and thought it wouldn't happen b/c BF had testicular injury. Told me they'd welcome it if it happened b/c assumed it wouldn't and then it did so were quite shocked but now fine with it  Patient saw Kalie for her NOB and hasn't had labs drawn yet b/c was going to do it today when did the innatal. dsicussed the limitations, sensitivity, cost/insurance coverage, etc. Patient would like NIPT testing today  Patient is hypothyroid. Has about 2 weeks left of her med and will need refill. Taking 50mcg and has been since dx. 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 Has had cold sores and just had one but hasn't ever had genital outbreak. Worried about that at delivery and with breastfeeding, etc. Discussed recommendations for c/s if has an active outbreak but o/w will do valtrex daily at 36 weeks and likely will be just fine to delivery. Breastfeeding is just fine as well  Patient has a heterozygous prothrombin 2 gene mutation w/o any h.o VTE. This should be fairly low risk for this preg but had referred her to [facility] to guidance. Patient's insurance told her they weren't in network when they were. Now has new insurance and they are covered so needs to call to make consultation appointment to discuss if needs baby ASA or maybe nothing but very low risk to need lovenox  Couldn't get fhts with doppler so BSUS done and moving, shepherd, viable fetus  Nausea is better. No more emesis and only did 2x. Really fatigued but that's starting to improve as well  Return 4 weeks and offer AFP

## 2018-01-09 LAB
RUBV IGG SERPL IA-ACNC: 20 IU/ML
T PALLIDUM IGG+IGM SER QL: NEGATIVE

## 2018-01-18 ENCOUNTER — TELEPHONE (OUTPATIENT)
Dept: NURSING | Facility: CLINIC | Age: 33
End: 2018-01-18

## 2018-01-18 NOTE — TELEPHONE ENCOUNTER
Innatal results: negative    Test    Result     Interpretation  Chromosome 21  No aneuploidy detected     Results consistent with two copies of chromosome 21  Chromosome 18  No aneuploidy detected  Results consistent with two copies of chromosome 18  Chromosome 13  No aneuploidy detected  Results consistent with two copies of chromosome 13  Sex Chromosome  No aneuploidy detected  Results consistent with two sex chromosomes (XY)-male    LMTCB.

## 2018-01-18 NOTE — TELEPHONE ENCOUNTER
Pt called back, requested gender be mychart messaged to her so her and her boyfriend could find out together.  Pt informed of results, stated understanding and had no further questions.   Routing to Dr. Robles as an FYI.

## 2018-01-25 ENCOUNTER — PRE VISIT (OUTPATIENT)
Dept: MATERNAL FETAL MEDICINE | Facility: CLINIC | Age: 33
End: 2018-01-25

## 2018-01-25 LAB — NON INVASIVE PRENATAL TEST CELL FREE DNA: NORMAL

## 2018-02-01 ENCOUNTER — OFFICE VISIT (OUTPATIENT)
Dept: MATERNAL FETAL MEDICINE | Facility: CLINIC | Age: 33
End: 2018-02-01
Attending: OBSTETRICS & GYNECOLOGY
Payer: COMMERCIAL

## 2018-02-01 DIAGNOSIS — O26.90 PREGNANCY RELATED CONDITION, ANTEPARTUM: ICD-10-CM

## 2018-02-01 DIAGNOSIS — O35.9XX0 SUSPECTED FETAL ANOMALY, ANTEPARTUM, SINGLE OR UNSPECIFIED FETUS: Primary | ICD-10-CM

## 2018-02-01 NOTE — MR AVS SNAPSHOT
After Visit Summary   2/1/2018    Sigrid Mann    MRN: [id number]           Patient Information     Date Of Birth          1985        Visit Information        Provider Department      2/1/2018 8:45 AM Rigo Gotti MD [facility] Maternal Fetal Medicine [facility]        Today's Diagnoses     Suspected fetal anomaly, antepartum, single or unspecified fetus    -  1    Pregnancy related condition, antepartum           Follow-ups after your visit        Your next 10 appointments already scheduled     Feb 05, 2018  8:40 AM CST   ESTABLISHED PRENATAL with Estefani Robles MD   [facility] ([facility])    [address] [place] [address] [address]  [facility] [address]   [phone]            Feb 22, 2018  8:00 AM CST   (Arrive by 7:45 AM)   MFM US COMP with [id number]   [facility] Maternal Fetal Medicine Ultrasound - [facility] ([facility])    [address] [place] [address]   [phone]           Wear comfortable clothes and leave your valuables at home.            Feb 22, 2018  8:30 AM CST   Radiology MD with  KRISTA FONSECA   [facility] Maternal Fetal Medicine [facility] ([facility])    [address] [place] [address]   [phone]           Please arrive at the time given for your first appointment. This visit is used internally to schedule the physician's time during your ultrasound.            Mar 05, 2018  8:00 AM CST   US PELVIC COMPLETE W TRANSVAGINAL with WEUS1   [facility] ([facility])    [address] [place] [address] [address]  [facility] [address]   [phone]           Please bring a list of your medicines (including vitamins, minerals and over-the-counter drugs). Also, tell your doctor about any allergies you may have. Wear comfortable clothes and leave your valuables at home.  Adults: Drink six 8-ounce glasses of fluid one hour before your  exam. Do NOT empty your bladder.  If you need to empty your bladder before your exam, try to release only a little bit of urine. Then, drink another 8oz glass of fluid.  Children: Children who are potty trained should drink at least 4 cups (32 oz) of liquid 45 minutes to one hour prior to the exam. The child s bladder must be full in order to achieve a diagnostic exam. If your child is very uncomfortable or has an urgent need to pee, please notify a technologist; they will try to find out how much longer the wait may be and provide instructions to help relieve the pressure. Occasionally it is medically necessary to insert a urinary catheter to fill the bladder.  Please call the Imaging Department at your exam site with any questions.            Mar 05, 2018  8:40 AM CST   ESTABLISHED PRENATAL with Estefani Robles MD   [facility] ([facility] [place])    [address] [place] [address]   [phone]              Future tests that were ordered for you today     Open Future Orders        Priority Expected Expires Ordered    [facility] US OB Complete 2/3 Tri Single Routine 2/15/2018 12/1/2018 2/1/2018    [facility] US Comprehensive Single Routine 2/22/2018 12/1/2018 2/1/2018            Who to contact     If you have questions or need follow up information about today's clinic visit or your schedule please contact [facility] MATERNAL FETAL MEDIC[facility] directly at [phone].  Normal or non-critical lab and imaging results will be communicated to you by MyChart, letter or phone within 4 business days after the clinic has received the results. If you do not hear from us within 7 days, please contact the clinic through MyChart or phone. If you have a critical or abnormal lab result, we will notify you by phone as soon as possible.  Submit refill requests through Apothesource or call your pharmacy and they will forward the refill request to us. Please allow 3 business days for  your refill to be completed.          Additional Information About Your Visit        MyChart Information     [employer] gives you secure access to your electronic health record. If you see a primary care provider, you can also send messages to your care team and make appointments. If you have questions, please call your primary care clinic.  If you do not have a primary care provider, please call [phone] and they will assist you.        Care EveryWhere ID     This is your Care EveryWhere ID. This could be used by other organizations to access your [place] medical records  [id number]        Your Vitals Were     Last Period                   10/09/2017            Blood Pressure from Last 3 Encounters:   01/08/18 114/68   12/14/17 116/73   10/04/17 135/87    Weight from Last 3 Encounters:   01/08/18 79.8 kg (176 lb)   12/14/17 78.6 kg (173 lb 3.2 oz)   10/04/17 76.2 kg (168 lb 1 oz)              We Performed the Following     MFM Office Visit        Primary Care Provider Office Phone # Fax #    Estefani Robles [phone] [address] [facility] [address]  [place] [address]        Equal Access to Services     [facility] AH: Hadii aad ku hadasho Sodarriusali, waaxda luqadaha, qaybta kaalmada adeegyada, whitney romero . So [facility] [phone].    ATENCIÓN: Si habla español, tiene a celis disposición servicios gratUNM Psychiatric Centeros de asistencia lingüística. Llame al [phone].    We comply with applicable federal civil rights laws and [place] laws. We do not discriminate on the basis of race, color, national origin, age, disability, sex, sexual orientation, or gender identity.            Thank you!     Thank you for choosing [facility]  for your care. Our goal is always to provide you with excellent care. Hearing back from our patients is one way we can continue to improve our services. Please take a few minutes to complete the written survey that you may receive  in the mail after your visit with us. Thank you!             Your Updated Medication List - Protect others around you: Learn how to safely use, store and throw away your medicines at www.disposemymeds.org.          This list is accurate as of 2/1/18  5:59 PM.  Always use your most recent med list.                   Brand Name Dispense Instructions for use Diagnosis    folic acid 1 MG tablet    FOLVITE    100 tablet    Take 4 tablets (4,000 mcg) by mouth daily    Blood clotting factor deficiency disorder (H)       levothyroxine 75 MCG tablet    SYNTHROID/LEVOTHROID    30 tablet    Take 1 tablet (75 mcg) by mouth daily    Hypothyroidism, unspecified type

## 2018-02-01 NOTE — PROGRESS NOTES
Maternal Fetal Medicine Consultation    Requesting Provider - Dr. Estefani Robles  Performing Provider - Dr. Rigo Gotti    Dear Dr. Robles -     Thank you for referring Sigrid Mann for consultation with maternal fetal medicine. I spent a total of 20 minutes with Sigrid during today's visit, with > 50% of that time spent in discussion regarding pregnancy management with thrombophilia.     This is her first pregnancy. She is a  at 15w1d. She denies any pregnancy complications thus far. She underwent NIPT screening with a low risk for the most common fetal aneuploidies. A dating US in your office confirmed pregnancy at 8w1d.     Sigrid was diagnosed with prothrombin gene mutation ([id number]) heterozygosity prior to this pregnancy. Her cousin suffered complications in pregnancy requiring some type of anticoagulation. Sigrid is unsure what complications identified the thrombophilia, but she does not recall her having any type of VTE or recurrent pregnancy loss that she was made aware of. She remembers her cousin needing to take a blood thinner at some point and being advised not to take long car trips or plane flights while she was pregnant. After Sigrid's mother was identified with the same mutation, Sigrid was also tested.     Sigrid has no first degree relatives with a history of VTE. She has no personal history of VTE or pregnancy loss. She is active and does not smoke.     Of note, she has also been identified as having the MTHFR mutation and was placed on high dose folate when she discovered she was pregnant at about 8 weeks gestation. She was not on prenatal vitamins at conception or taking folate, as she and her boyfriend believed that he was infertile due to a scrotal injury previously. This was an unplanned but desired pregnancy. She started 4 mg of folate with pregnancy confirmation. Sigrid does express significant anxiety about not being on folate at conception, especially with her MTHFR, and worries about the  risk of spina bifida due to this.     Past medical history is significant for hypothyroidism on levothyroxine, 75 mcg currently.     She has no medications other than levothyroxine, folate and prenatal vitamin.     She does not smoke and denies a history of substance use disorders or illicit substance misuse. She lives with her boyfriend.     Prothrombin gene mutation is considered a lower risk thrombophilia, with regards to VTE risk in pregnancy. General risk of VTE is quoted at about 0.5% per pregnancy,with about a 1.5% risk in the postpartum period. Since she has no personal or immediate family history of VTE, and has no compound thrombophilia, pregnancy management includes the option for surveillance, with initiation of prophylactic anticoagulation with other risk factors (bedrest, , long trips). The alternative is prophylactic anticoagulation, often with enoxaparin 40 mg SQ daily, throughout pregnancy, with it being held 24 hours before planned delivery to allow for regional anesthesia. This would be restarted 12-24 hours after delivery and continued until 6 weeks postpartum.     Given her reassuring history, she has opted for surveillance, and I have recommended that she start a low dose aspirin 81 mg daily until 36-37 weeks. If she has a  section, I recommend 6 weeks of postop prophylaxis, likely with enoxaparin. Likewise if she is placed on bedrest before delivery, this should be considered.     With regards to the MTHFR mutation, recent data do not suggest an association with adverse pregnancy outcomes or VTE,provided any hyperhomocysteinemia is treated with higher dose folate, which she is taking. No additional pregnancy surveillance is indicated for this mutation.     Sigrid did note during our exam that she remains very concerned about risk of open neural tube defects in particular, but other major abnormalities, since she was not taking folate at conception. I attempted to reassure her  that she remains at a relatively low risk of abnormalities. She did indicate that she would consider termination for a major fetal anomaly, and inquired about earlier screening based on her history. She was offered an anatomic survey at 17 weeks here to allow for earlier decision making should that be an issue or concern. I would also recommend that she have an msAFP drawn at her next OB visit with your office. If her anatomic survey is within normal limits, she will need minimal additional surveillance, with the only extra recommendation being a possible growth US at 32-34 weeks gestation.     Thank you for allowing me to share in Sigrid's care. Please let me know if I can answer any additional questions or concerns.     Rigo Gotti MD  Maternal Fetal Medicine

## 2018-02-05 ENCOUNTER — PRENATAL OFFICE VISIT (OUTPATIENT)
Dept: OBGYN | Facility: CLINIC | Age: 33
End: 2018-02-05
Payer: COMMERCIAL

## 2018-02-05 VITALS — BODY MASS INDEX: 30.9 KG/M2 | SYSTOLIC BLOOD PRESSURE: 114 MMHG | WEIGHT: 180 LBS | DIASTOLIC BLOOD PRESSURE: 70 MMHG

## 2018-02-05 DIAGNOSIS — E03.9 HYPOTHYROIDISM, UNSPECIFIED TYPE: ICD-10-CM

## 2018-02-05 DIAGNOSIS — A60.04 HERPES SIMPLEX VULVOVAGINITIS: ICD-10-CM

## 2018-02-05 DIAGNOSIS — D68.52 PROTHROMBIN GENE MUTATION (H): ICD-10-CM

## 2018-02-05 DIAGNOSIS — O99.280 HYPOTHYROID IN PREGNANCY, ANTEPARTUM: ICD-10-CM

## 2018-02-05 DIAGNOSIS — O09.92 SUPERVISION OF HIGH RISK PREGNANCY IN SECOND TRIMESTER: Primary | ICD-10-CM

## 2018-02-05 DIAGNOSIS — E03.9 HYPOTHYROID IN PREGNANCY, ANTEPARTUM: ICD-10-CM

## 2018-02-05 PROBLEM — Z15.89 MTHFR MUTATION: Status: ACTIVE | Noted: 2018-02-05

## 2018-02-05 LAB — TSH SERPL DL<=0.005 MIU/L-ACNC: 2.3 MU/L (ref 0.4–4)

## 2018-02-05 PROCEDURE — 99000 SPECIMEN HANDLING OFFICE-LAB: CPT | Performed by: OBSTETRICS & GYNECOLOGY

## 2018-02-05 PROCEDURE — 36415 COLL VENOUS BLD VENIPUNCTURE: CPT | Performed by: OBSTETRICS & GYNECOLOGY

## 2018-02-05 PROCEDURE — 82105 ALPHA-FETOPROTEIN SERUM: CPT | Mod: 90 | Performed by: OBSTETRICS & GYNECOLOGY

## 2018-02-05 PROCEDURE — 84443 ASSAY THYROID STIM HORMONE: CPT | Performed by: OBSTETRICS & GYNECOLOGY

## 2018-02-05 PROCEDURE — 99207 ZZC PRENATAL VISIT: CPT | Performed by: OBSTETRICS & GYNECOLOGY

## 2018-02-05 NOTE — PROGRESS NOTES
Patient had her MFM consult last week. After discussion of pros/cons and r/b/a of her Prothrombin II gene mutation she has opted for baby asa and no lovenox at this time. If goes on bedrest or if has a 6 c/s will need lovenox PP. If has a normal pregnancy and vaginal delivery she won't need lovenox  Patient is very concerned about the fact that she wasn't taking extra folate in early preg with her MTHFR mutation. Discussed this with [facility] as well. AFP today and patient will do an early anatomy scan next week with them for that.  I have encouraged her to just do her regular anatomy u/s with us in 4 weeks if her AFP and 17 week u/s are normal  Also adjusted her thyroid med last month. Repeat tsh this month and if <2.5 will refill larger volume of her thyroid med.  Return 4 weeks.

## 2018-02-05 NOTE — MR AVS SNAPSHOT
After Visit Summary   2/5/2018    Sigrid Mann    MRN: [id number]           Patient Information     Date Of Birth          1985        Visit Information        Provider Department      2/5/2018 8:40 AM Estefani Robles MD [facility] [place]        Today's Diagnoses     Supervision of high risk pregnancy in second trimester    -  1    Hypothyroid in pregnancy, antepartum        Prothrombin gene mutation (H)        Herpes simplex vulvovaginitis           Follow-ups after your visit        Your next 10 appointments already scheduled     Feb 15, 2018 11:00 AM CST   (Arrive by 10:45 AM)   [facility] US OB COMP 2/3 TRI SINGLE with [id number]   [facility] Maternal Fetal Medicine Ultrasound - [facility] ([facility])    [address]  [facility] [address] [phone]           Wear comfortable clothes and leave your valuables at home.            Feb 15, 2018 11:30 AM CST   Radiology MD with [facility] MD   [facility] Maternal Fetal Medicine - [facility] ([facility])    [address]  [facility] [address] [phone]           Please arrive at the time given for your first appointment. This visit is used internally to schedule the physician's time during your ultrasound.            Mar 05, 2018  8:00 AM CST   US PELVIC COMPLETE W TRANSVAGINAL with WEUS1   [facility] ([facility])    [address]  [facility] [address]   [phone]           Please bring a list of your medicines (including vitamins, minerals and over-the-counter drugs). Also, tell your doctor about any allergies you may have. Wear comfortable clothes and leave your valuables at home.  Adults: Drink six 8-ounce glasses of fluid one hour before your exam. Do NOT empty your bladder.  If you need to empty your bladder before your exam, try to release only a little bit of urine. Then, drink another 8oz glass of  fluid.  Children: Children who are potty trained should drink at least 4 cups (32 oz) of liquid 45 minutes to one hour prior to the exam. The child s bladder must be full in order to achieve a diagnostic exam. If your child is very uncomfortable or has an urgent need to pee, please notify a technologist; they will try to find out how much longer the wait may be and provide instructions to help relieve the pressure. Occasionally it is medically necessary to insert a urinary catheter to fill the bladder.  Please call the Imaging Department at your exam site with any questions.            Mar 05, 2018  8:40 AM CST   ESTABLISHED PRENATAL with Estefani Robles MD   [facility] ([facility]    [address] [place] [address]   [phone]              Future tests that were ordered for you today     Open Future Orders        Priority Expected Expires Ordered    US OB > 14 Weeks Complete Single Routine 3/5/2018 2/6/2019 2/5/2018            Who to contact     If you have questions or need follow up information about today's clinic visit or your schedule please contact [facility] [place] directly at [phone].  Normal or non-critical lab and imaging results will be communicated to you by MyChart, letter or phone within 4 business days after the clinic has received the results. If you do not hear from us within 7 days, please contact the clinic through Veotaghart or phone. If you have a critical or abnormal lab result, we will notify you by phone as soon as possible.  Submit refill requests through Fangtek or call your pharmacy and they will forward the refill request to us. Please allow 3 business days for your refill to be completed.          Additional Information About Your Visit        Veot[employer] Information     [employer] gives you secure access to your electronic health record. If you see a primary care provider, you can also send messages to your  care team and make appointments. If you have questions, please call your primary care clinic.  If you do not have a primary care provider, please call [phone] and they will assist you.        Care EveryWhere ID     This is your Care EveryWhere ID. This could be used by other organizations to access your [place] medical records  [id number]        Your Vitals Were     Last Period BMI (Body Mass Index)                10/09/2017 30.9 kg/m2           Blood Pressure from Last 3 Encounters:   02/05/18 114/70   01/08/18 114/68   12/14/17 116/73    Weight from Last 3 Encounters:   02/05/18 180 lb (81.6 kg)   01/08/18 176 lb (79.8 kg)   12/14/17 173 lb 3.2 oz (78.6 kg)              We Performed the Following     Alpha fetoprotein maternal screen     TSH        Primary Care Provider Office Phone # Fax #    Estefani Robles [phone] [address] [facility] [address]  [facility] [address]        Equal Access to Services     [employer] : Hadii aad ku hadasho Soomaali, waaxda luqadaha, qaybta kaalmada adeegyada, whitney romero . So [facility] [phone].    ATENCIÓN: Si habla español, tiene a celis disposición servicios gratuitos de asistencia lingüística. Llame al [phone].    We comply with applicable federal civil rights laws and [place] laws. We do not discriminate on the basis of race, color, national origin, age, disability, sex, sexual orientation, or gender identity.            Thank you!     Thank you for choosing [facility] [place]  for your care. Our goal is always to provide you with excellent care. Hearing back from our patients is one way we can continue to improve our services. Please take a few minutes to complete the written survey that you may receive in the mail after your visit with us. Thank you!             Your Updated Medication List - Protect others around you: Learn how to safely use, store and throw away your medicines at www.disposemymeds.org.           This list is accurate as of 2/5/18  1:04 PM.  Always use your most recent med list.                   Brand Name Dispense Instructions for use Diagnosis    folic acid 1 MG tablet    FOLVITE    100 tablet    Take 4 tablets (4,000 mcg) by mouth daily    Blood clotting factor deficiency disorder (H)       levothyroxine 75 MCG tablet    SYNTHROID/LEVOTHROID    30 tablet    Take 1 tablet (75 mcg) by mouth daily    Hypothyroidism, unspecified type

## 2018-02-05 NOTE — NURSING NOTE
Please abstract the following data from this visit with this patient into the appropriate field in Epic:    HPV done on 01/23/15 by Karri RABAGO, result was negative.    Report under Media

## 2018-02-06 RX ORDER — LEVOTHYROXINE SODIUM 75 UG/1
75 TABLET ORAL DAILY
Qty: 90 TABLET | Refills: 0 | Status: SHIPPED | OUTPATIENT
Start: 2018-02-06 | End: 2018-05-15

## 2018-02-07 ENCOUNTER — TELEPHONE (OUTPATIENT)
Dept: NURSING | Facility: CLINIC | Age: 33
End: 2018-02-07

## 2018-02-07 LAB
# FETUSES US: NORMAL
AFP ADJ MOM AMN: 1.04
AFP SERPL-MCNC: 29 NG/ML
AGE - REPORTED: 33.8 YR
DATING METHOD: NORMAL
DIABETIC AT CONCEPTION: NO
FAMILY MEMBER DISEASES HX: NO
FAMILY MEMBER DISEASES HX: NO
GA METHOD: NORMAL
GA: 15.71 WEEKS
HX OF HEREDITARY DISORDERS: NO
IDDM PATIENT QL: NO
INTEGRATED SCN PATIENT-IMP: NORMAL
LMP START DATE: NORMAL
PREV HX CHROMOSOME ABNORMALITY: NO
SPECIMEN DRAWN SERPL: NORMAL
TWINS: NORMAL

## 2018-02-07 NOTE — TELEPHONE ENCOUNTER
90% of sinus infections are viral anyway and don't really respond to abx. i'd try the meds you suggested and if not getting better in 4-5 days then can let us know for possible abx.

## 2018-02-07 NOTE — TELEPHONE ENCOUNTER
16w0d, JOHANNA 7/25/18. Pt has had a cold for the last 6 days. Pt feels congested. Nasal drainage is green in the morning but goes to clear during the day. Pt's last temp 98.6. Just occ non productive cough. No sore throat or ear ache. Some body aches but not too bad. Congestion so bad eyes are watering. Pt wanted to know what she could take. Pt informed she could use Afrin nasal spray for 3-4 days. Can also take plain sudafed. Pt wondering if she has sinus infection and needs Abx.  Routing to Dr. Robles. Please advise

## 2018-02-15 ENCOUNTER — OFFICE VISIT (OUTPATIENT)
Dept: MATERNAL FETAL MEDICINE | Facility: CLINIC | Age: 33
End: 2018-02-15
Attending: OBSTETRICS & GYNECOLOGY
Payer: COMMERCIAL

## 2018-02-15 ENCOUNTER — HOSPITAL ENCOUNTER (OUTPATIENT)
Dept: ULTRASOUND IMAGING | Facility: CLINIC | Age: 33
Discharge: HOME | End: 2018-02-15
Attending: OBSTETRICS & GYNECOLOGY | Admitting: OBSTETRICS & GYNECOLOGY
Payer: COMMERCIAL

## 2018-02-15 DIAGNOSIS — O26.90 PREGNANCY RELATED CONDITION, ANTEPARTUM: Primary | ICD-10-CM

## 2018-02-15 DIAGNOSIS — O26.90 PREGNANCY RELATED CONDITION, ANTEPARTUM: ICD-10-CM

## 2018-02-15 PROCEDURE — 76805 OB US >/= 14 WKS SNGL FETUS: CPT

## 2018-02-15 NOTE — PROGRESS NOTES
"Please see \"Imaging\" tab under \"Chart Review\" for details of today's visit.    Carlos Davison    "

## 2018-02-15 NOTE — MR AVS SNAPSHOT
After Visit Summary   2/15/2018    Sigrid Mann    MRN: [id number]           Patient Information     Date Of Birth          1985        Visit Information        Provider Department      2/15/2018 11:30 AM Carlos Davison MD [facility]        Today's Diagnoses     Pregnancy related condition, antepartum    -  1    Evaluate anatomy not seen on prior sonogram           Follow-ups after your visit        Your next 10 appointments already scheduled     Mar 05, 2018  8:00 AM CST   US PELVIC COMPLETE W TRANSVAGINAL with WEUS1   [facility] ([facility])    [address] [place] [address]   [phone]           Please bring a list of your medicines (including vitamins, minerals and over-the-counter drugs). Also, tell your doctor about any allergies you may have. Wear comfortable clothes and leave your valuables at home.  Adults: Drink six 8-ounce glasses of fluid one hour before your exam. Do NOT empty your bladder.  If you need to empty your bladder before your exam, try to release only a little bit of urine. Then, drink another 8oz glass of fluid.  Children: Children who are potty trained should drink at least 4 cups (32 oz) of liquid 45 minutes to one hour prior to the exam. The child s bladder must be full in order to achieve a diagnostic exam. If your child is very uncomfortable or has an urgent need to pee, please notify a technologist; they will try to find out how much longer the wait may be and provide instructions to help relieve the pressure. Occasionally it is medically necessary to insert a urinary catheter to fill the bladder.  Please call the Imaging Department at your exam site with any questions.            Mar 05, 2018  8:40 AM CST   ESTABLISHED PRENATAL with Estefani Robles MD   [facility] ([facility])    [address]  [place] [address]   [phone]              Future tests that were ordered for you today     Open Future Orders        Priority Expected Expires Ordered    MFM US Comprehensive Single Routine 3/8/2018 12/15/2018 2/15/2018            Who to contact     If you have questions or need follow up information about today's clinic visit or your schedule please contact [facility] directly at [phone].  Normal or non-critical lab and imaging results will be communicated to you by Van[employer] Technology[employer], letter or phone within 4 business days after the clinic has received the results. If you do not hear from us within 7 days, please contact the clinic through Arch[employer]at or phone. If you have a critical or abnormal lab result, we will notify you by phone as soon as possible.  Submit refill requests through [employer] or call your pharmacy and they will forward the refill request to us. Please allow 3 business days for your refill to be completed.          Additional Information About Your Visit        [employer] Information     [employer] gives you secure access to your electronic health record. If you see a primary care provider, you can also send messages to your care team and make appointments. If you have questions, please call your primary care clinic.  If you do not have a primary care provider, please call [phone] and they will assist you.        Care EveryWhere ID     This is your Care EveryWhere ID. This could be used by other organizations to access your [place] medical records  [id number]        Your Vitals Were     Last Period                   10/09/2017            Blood Pressure from Last 3 Encounters:   02/05/18 114/70   01/08/18 114/68   12/14/17 116/73    Weight from Last 3 Encounters:   02/05/18 81.6 kg (180 lb)   01/08/18 79.8 kg (176 lb)   12/14/17 78.6 kg (173 lb 3.2 oz)               Primary Care Provider Office Phone # Fax #    Estefani Robles [phone] [address] [facility]  SHAKIR GALICIA [facility] [address]  [facility] [address]        Equal Access to Services     MAGAN PEOPLES : Hadii aad ku hadparamave Jyotimarielle, wabreeda juan carlos, kaitlinmaynor jacobodariondaniel yuan, whitney robertsonmacfabrice romero . So [facility] [phone].    ATENCIÓN: Si habla español, tiene a celis disposición servicios gratuitos de asistencia lingüística. Llame al [phone].    We comply with applicable federal civil rights laws and [place] laws. We do not discriminate on the basis of race, color, national origin, age, disability, sex, sexual orientation, or gender identity.            Thank you!     Thank you for choosing [facility]  for your care. Our goal is always to provide you with excellent care. Hearing back from our patients is one way we can continue to improve our services. Please take a few minutes to complete the written survey that you may receive in the mail after your visit with us. Thank you!             Your Updated Medication List - Protect others around you: Learn how to safely use, store and throw away your medicines at www.disposemymeds.org.          This list is accurate as of 2/15/18  1:14 PM.  Always use your most recent med list.                   Brand Name Dispense Instructions for use Diagnosis    folic acid 1 MG tablet    FOLVITE    100 tablet    Take 4 tablets (4,000 mcg) by mouth daily    Blood clotting factor deficiency disorder (H)       levothyroxine 75 MCG tablet    SYNTHROID/LEVOTHROID    90 tablet    Take 1 tablet (75 mcg) by mouth daily    Hypothyroidism, unspecified type

## 2018-03-05 ENCOUNTER — TELEPHONE (OUTPATIENT)
Dept: NURSING | Facility: CLINIC | Age: 33
End: 2018-03-05

## 2018-03-05 ENCOUNTER — PRENATAL OFFICE VISIT (OUTPATIENT)
Dept: OBGYN | Facility: CLINIC | Age: 33
End: 2018-03-05
Payer: COMMERCIAL

## 2018-03-05 ENCOUNTER — RADIANT APPOINTMENT (OUTPATIENT)
Dept: ULTRASOUND IMAGING | Facility: CLINIC | Age: 33
End: 2018-03-05
Payer: COMMERCIAL

## 2018-03-05 ENCOUNTER — TELEPHONE (OUTPATIENT)
Dept: MATERNAL FETAL MEDICINE | Facility: CLINIC | Age: 33
End: 2018-03-05

## 2018-03-05 VITALS — BODY MASS INDEX: 31.76 KG/M2 | WEIGHT: 185 LBS | DIASTOLIC BLOOD PRESSURE: 66 MMHG | SYSTOLIC BLOOD PRESSURE: 120 MMHG

## 2018-03-05 DIAGNOSIS — O99.280 HYPOTHYROID IN PREGNANCY, ANTEPARTUM: ICD-10-CM

## 2018-03-05 DIAGNOSIS — A60.04 HERPES SIMPLEX VULVOVAGINITIS: ICD-10-CM

## 2018-03-05 DIAGNOSIS — D68.52 PROTHROMBIN GENE MUTATION (H): ICD-10-CM

## 2018-03-05 DIAGNOSIS — O09.92 SUPERVISION OF HIGH RISK PREGNANCY IN SECOND TRIMESTER: ICD-10-CM

## 2018-03-05 DIAGNOSIS — E03.9 HYPOTHYROID IN PREGNANCY, ANTEPARTUM: ICD-10-CM

## 2018-03-05 DIAGNOSIS — Z15.89 MTHFR MUTATION: ICD-10-CM

## 2018-03-05 DIAGNOSIS — O09.92 SUPERVISION OF HIGH RISK PREGNANCY IN SECOND TRIMESTER: Primary | ICD-10-CM

## 2018-03-05 PROCEDURE — 76805 OB US >/= 14 WKS SNGL FETUS: CPT | Performed by: OBSTETRICS & GYNECOLOGY

## 2018-03-05 PROCEDURE — 99207 ZZC PRENATAL VISIT: CPT | Performed by: OBSTETRICS & GYNECOLOGY

## 2018-03-05 NOTE — TELEPHONE ENCOUNTER
KRISTA had gentile order for a level II u/s and they have been trying to contact the pt and the pt has not returned their calls. KRISTA would like to know if Dr. Robles still needed her to have an u/s for a level II at their location.   Routing to Dr. Robles to review and advise.

## 2018-03-05 NOTE — MR AVS SNAPSHOT
After Visit Summary   3/5/2018    Sigrid Mann    MRN: [id number]           Patient Information     Date Of Birth          1985        Visit Information        Provider Department      3/5/2018 8:40 AM Estefani Robles MD [facility] [place]        Today's Diagnoses     Supervision of high risk pregnancy in second trimester    -  1    Hypothyroid in pregnancy, antepartum        Prothrombin gene mutation (H)        MTHFR mutation (H)        Herpes simplex vulvovaginitis           Follow-ups after your visit        Your next 10 appointments already scheduled     Apr 09, 2018  4:20 PM CDT   ESTABLISHED PRENATAL with Estefani Robles MD   [facility] [place] ([facility] [place])    [address] [place] [address] [address]  [place] [address]   [phone]            May 14, 2018  8:40 AM CDT   LAB with WE LAB   [facility] [place] ([facility] [place])    [address] [place] [address] [address]  [place] [address]   [phone]           Please do not eat 10-12 hours before your appointment if you are coming in fasting for labs on lipids, cholesterol, or glucose (sugar). This does not apply to pregnant women. Water, hot tea and black coffee (with nothing added) are okay. Do not drink other fluids, diet soda or chew gum.            May 14, 2018  8:50 AM CDT   ESTABLISHED PRENATAL with Estefani Robles MD   [facility] [place] ([facility] [place])    [address] [facility] [address]  [place] [address]   [phone]            May 30, 2018  8:30 AM CDT   ESTABLISHED PRENATAL with Estefani Robles MD   [facility] [place] ([facility] [place])    [address] [facility] [address]  [facility] [address]   [phone]              Who to contact     If you have questions or need follow up information about today's clinic visit or your schedule please contact [facility] [place] directly at  [phone].  Normal or non-critical lab and imaging results will be communicated to you by MyChart, letter or phone within 4 business days after the clinic has received the results. If you do not hear from us within 7 days, please contact the clinic through [employer] or phone. If you have a critical or abnormal lab result, we will notify you by phone as soon as possible.  Submit refill requests through Lles[employer] or call your pharmacy and they will forward the refill request to us. Please allow 3 business days for your refill to be completed.          Additional Information About Your Visit        [employer] Information     [employer] gives you secure access to your electronic health record. If you see a primary care provider, you can also send messages to your care team and make appointments. If you have questions, please call your primary care clinic.  If you do not have a primary care provider, please call [phone] and they will assist you.        Care EveryWhere ID     This is your Care EveryWhere ID. This could be used by other organizations to access your [place] medical records  [id number]        Your Vitals Were     Last Period BMI (Body Mass Index)                10/09/2017 31.76 kg/m2           Blood Pressure from Last 3 Encounters:   03/05/18 120/66   02/05/18 114/70   01/08/18 114/68    Weight from Last 3 Encounters:   03/05/18 185 lb (83.9 kg)   02/05/18 180 lb (81.6 kg)   01/08/18 176 lb (79.8 kg)              Today, you had the following     No orders found for display       Primary Care Provider Office Phone # Fax #    Estefani Robles [phone] [address] [facility] [address]  [facility] [address]        Equal Access to Services     LOL[facility] HospitalISAÍAS : Hadii erik Owens, hoda rangel, qawhitnye cole. So [facility] [phone].    ATENCIÓN: Si habla español, tiene a celis disposición servicios gratuitos de asistencia lingüística. Blas  al [phone].    We comply with applicable federal civil rights laws and [place] laws. We do not discriminate on the basis of race, color, national origin, age, disability, sex, sexual orientation, or gender identity.            Thank you!     Thank you for choosing [facility] [place]  for your care. Our goal is always to provide you with excellent care. Hearing back from our patients is one way we can continue to improve our services. Please take a few minutes to complete the written survey that you may receive in the mail after your visit with us. Thank you!             Your Updated Medication List - Protect others around you: Learn how to safely use, store and throw away your medicines at www.disposemymeds.org.          This list is accurate as of 3/5/18  9:24 AM.  Always use your most recent med list.                   Brand Name Dispense Instructions for use Diagnosis    folic acid 1 MG tablet    FOLVITE    100 tablet    Take 4 tablets (4,000 mcg) by mouth daily    Blood clotting factor deficiency disorder (H)       levothyroxine 75 MCG tablet    SYNTHROID/LEVOTHROID    90 tablet    Take 1 tablet (75 mcg) by mouth daily    Hypothyroidism, unspecified type

## 2018-03-05 NOTE — PROGRESS NOTES
Anatomy u/s is normal. Anterior placenta so not really feeling any FM  Feels like she's swollen already but was on her feet a lot yesterday. Discussed elevation, compression hose, etc  Discussed sleep positions, delivery at [facility] and risk of being on divert b/c heard that can happen but reassured it will be fine  Getting round ligament pain and arms fall asleep when she's sleeping but o/w no issues or concerns  Return 4 weeks.  Plan TSH rechk at 28 weeks with GCT or sooner if having any sx

## 2018-03-05 NOTE — TELEPHONE ENCOUNTER
Triage Nurse Modesta from [facility] called stating that PT will not need MFM Comprehensive U/S that was ordered. The U/S that the PT had today with her primary OB clinic will replace the MFM Comp U/S. Order has been cancelled and removed.      Eliel Jay

## 2018-03-05 NOTE — TELEPHONE ENCOUNTER
No, she had an u/s here today and it was normal.  I actually also thought she was going to do the LII so was surprised when she had it here.   It was completely normal so I think we can cancel it.

## 2018-03-28 DIAGNOSIS — D68.4 BLOOD CLOTTING FACTOR DEFICIENCY DISORDER (H): ICD-10-CM

## 2018-03-29 RX ORDER — FOLIC ACID 1 MG/1
TABLET ORAL
Qty: 100 TABLET | Refills: 0 | Status: SHIPPED | OUTPATIENT
Start: 2018-03-29 | End: 2018-04-26

## 2018-03-29 NOTE — TELEPHONE ENCOUNTER
folic acid (FOLVITE) 1 MG tablet   Last Written Prescription Date:  11/22/17  Last Fill Quantity: 100,   # refills: 3  Last Office Visit with [facility] primary care provider:  3/5/18  Future Office visit: 4/9/18    Routing refill request to provider for review/approval because:  Rx sent per plan of care.

## 2018-04-09 ENCOUNTER — PRENATAL OFFICE VISIT (OUTPATIENT)
Dept: OBGYN | Facility: CLINIC | Age: 33
End: 2018-04-09
Payer: COMMERCIAL

## 2018-04-09 VITALS — BODY MASS INDEX: 33.47 KG/M2 | WEIGHT: 195 LBS | SYSTOLIC BLOOD PRESSURE: 120 MMHG | DIASTOLIC BLOOD PRESSURE: 66 MMHG

## 2018-04-09 DIAGNOSIS — O09.92 SUPERVISION OF HIGH RISK PREGNANCY IN SECOND TRIMESTER: Primary | ICD-10-CM

## 2018-04-09 DIAGNOSIS — D68.52 PROTHROMBIN GENE MUTATION (H): ICD-10-CM

## 2018-04-09 DIAGNOSIS — O99.280 HYPOTHYROID IN PREGNANCY, ANTEPARTUM: ICD-10-CM

## 2018-04-09 DIAGNOSIS — A60.04 HERPES SIMPLEX VULVOVAGINITIS: ICD-10-CM

## 2018-04-09 DIAGNOSIS — E03.9 HYPOTHYROID IN PREGNANCY, ANTEPARTUM: ICD-10-CM

## 2018-04-09 DIAGNOSIS — Z15.89 MTHFR MUTATION: ICD-10-CM

## 2018-04-09 PROCEDURE — 99207 ZZC PRENATAL VISIT: CPT | Performed by: OBSTETRICS & GYNECOLOGY

## 2018-04-09 NOTE — MR AVS SNAPSHOT
After Visit Summary   4/9/2018    Sigrid Mann    MRN: [id number]           Patient Information     Date Of Birth          1985        Visit Information        Provider Department      4/9/2018 4:20 PM Estefani Robles MD [facility] [place]        Today's Diagnoses     Supervision of high risk pregnancy in second trimester    -  1    Herpes simplex vulvovaginitis        Prothrombin gene mutation (H)        MTHFR mutation (H)        Hypothyroid in pregnancy, antepartum           Follow-ups after your visit        Your next 10 appointments already scheduled     May 14, 2018  8:40 AM CDT   LAB with WE LAB   [facility] [place] ([facility] [place])    [address] [place] [address]   [phone]           Please do not eat 10-12 hours before your appointment if you are coming in fasting for labs on lipids, cholesterol, or glucose (sugar). This does not apply to pregnant women. Water, hot tea and black coffee (with nothing added) are okay. Do not drink other fluids, diet soda or chew gum.            May 14, 2018  8:50 AM CDT   ESTABLISHED PRENATAL with Estefani Robles MD   [facility] [place] ([facility] [place])    [address] [place] [address]   [phone]            May 30, 2018  8:30 AM CDT   ESTABLISHED PRENATAL with Estefani Robles MD   [facility] [place] ([facility] [place])    [address] [place] [address]   [phone]              Who to contact     If you have questions or need follow up information about today's clinic visit or your schedule please contact [facility] [place] directly at [phone].  Normal or non-critical lab and imaging results will be communicated to you by MyChart, letter or phone within 4 business days after the clinic has received the results. If you do not hear from us within 7 days,  please contact the clinic through QuotaDe[employer] or phone. If you have a critical or abnormal lab result, we will notify you by phone as soon as possible.  Submit refill requests through Qu[employer]De[employer] or call your pharmacy and they will forward the refill request to us. Please allow 3 business days for your refill to be completed.          Additional Information About Your Visit        [employer] Information     Qu[employer] gives you secure access to your electronic health record. If you see a primary care provider, you can also send messages to your care team and make appointments. If you have questions, please call your primary care clinic.  If you do not have a primary care provider, please call [phone] and they will assist you.        Care EveryWhere ID     This is your Care EveryWhere ID. This could be used by other organizations to access your [place] medical records  [id number]        Your Vitals Were     Last Period BMI (Body Mass Index)                10/09/2017 33.47 kg/m2           Blood Pressure from Last 3 Encounters:   04/09/18 120/66   03/05/18 120/66   02/05/18 114/70    Weight from Last 3 Encounters:   04/09/18 195 lb (88.5 kg)   03/05/18 185 lb (83.9 kg)   02/05/18 180 lb (81.6 kg)              Today, you had the following     No orders found for display       Primary Care Provider Office Phone # Fax #    Estefani Robles [phone] [address] [facility] [address]  [place] [address]        Equal Access to Services     [facility]AS : Hadii erik mooreo Graciela, waaxda lurigobertoadaha, qaybta kaalmada lissy, whitney pacheco. So [facility] [phone].    ATENCIÓN: Si habla español, tiene a celis disposición servicios gratuitos de asistencia lingüística. Llame al [phone].    We comply with applicable federal civil rights laws and [place] laws. We do not discriminate on the basis of race, color, national origin, age, disability, sex, sexual orientation, or gender  identity.            Thank you!     Thank you for choosing [facility]  for your care. Our goal is always to provide you with excellent care. Hearing back from our patients is one way we can continue to improve our services. Please take a few minutes to complete the written survey that you may receive in the mail after your visit with us. Thank you!             Your Updated Medication List - Protect others around you: Learn how to safely use, store and throw away your medicines at www.disposemymeds.org.          This list is accurate as of 4/9/18  9:32 PM.  Always use your most recent med list.                   Brand Name Dispense Instructions for use Diagnosis    folic acid 1 MG tablet    FOLVITE    100 tablet    TAKE 4 TABLET(4,000 MCG) BY MOUTH DAILY.    Blood clotting factor deficiency disorder (H)       levothyroxine 75 MCG tablet    SYNTHROID/LEVOTHROID    90 tablet    Take 1 tablet (75 mcg) by mouth daily    Hypothyroidism, unspecified type

## 2018-04-10 ENCOUNTER — TELEPHONE (OUTPATIENT)
Dept: NURSING | Facility: CLINIC | Age: 33
End: 2018-04-10

## 2018-04-10 ENCOUNTER — MYC MEDICAL ADVICE (OUTPATIENT)
Dept: NURSING | Facility: CLINIC | Age: 33
End: 2018-04-10

## 2018-04-10 NOTE — PROGRESS NOTES
Feeling more FM now  No ctx or cramps  Some heartburn so taking tums. Discussed zantac and PPI use in preg with all r/b/a  Getting some small waves of nausea which could be acid/gastritis related as well  Has gained a lot of weight so far at almost 30#. Less active for sure but not eating badly. Feels like just really retaining fluid. Will need to monitor for that closely in terms of PIH risk/GDM risk  Plan repeat TSH next month when here for gct/hgb. Discussed tdap as well  Discussed hosp tour and annette classes etc  Discussed HSV and transmission risk with valtrex starting at 36 weeks  Return 4 wks

## 2018-04-10 NOTE — TELEPHONE ENCOUNTER
My chart message sent.       Estefani Robles MD  P We Triage                     Patient wanted to know the rate of transmission of herpes to the baby at the time of delivery. Can we send her a [employer] message with the info:     Reviewed several sources and rates vary. In people who have HSV-1 and HSV-2 antibodies in their blood the risk was anywhere from 10 to 54 per 100,000. Meaning about 1-5/1000 so very low risk. Being on valtrex should decrease that even further.     AJ

## 2018-04-16 ENCOUNTER — TELEPHONE (OUTPATIENT)
Dept: OBGYN | Facility: CLINIC | Age: 33
End: 2018-04-16

## 2018-04-23 ENCOUNTER — TELEPHONE (OUTPATIENT)
Dept: OBGYN | Facility: CLINIC | Age: 33
End: 2018-04-23

## 2018-04-23 NOTE — TELEPHONE ENCOUNTER
26w5d, JOHANNA 7/25/18. Pt has a bad toothache and wanted to know if she could take tylenol for the pain. Pt has appointment scheduled with dentist tomorrow. Pt informed she could take two extra strength tylenol every 6 hours.

## 2018-04-26 ENCOUNTER — TELEPHONE (OUTPATIENT)
Dept: NURSING | Facility: CLINIC | Age: 33
End: 2018-04-26

## 2018-04-26 DIAGNOSIS — D68.4 BLOOD CLOTTING FACTOR DEFICIENCY DISORDER (H): ICD-10-CM

## 2018-04-26 NOTE — TELEPHONE ENCOUNTER
"[facility]NA Triage Call  Presenting Problem: Marv(boyfriend) calling\" The dentist gave Sigrid an RX for amoxicillin for dental pain, she had a route canal. Is it ok for her to take it?\"  Patient Recommendations/Teaching:Yes  Chandrika Alonso RN [facility]            "

## 2018-04-27 RX ORDER — FOLIC ACID 1 MG/1
TABLET ORAL
Qty: 100 TABLET | Refills: 3 | Status: SHIPPED | OUTPATIENT
Start: 2018-04-27 | End: 2018-10-01

## 2018-04-30 NOTE — TELEPHONE ENCOUNTER
Returning pt's call,  Pt had root canal done last week- were supposed to place her on antibiotics, but they didn't and she developed infection. Currently on amoxicillin  Has been taking tylenol since last Wednesday [id number], wondering if that is still safe.     Currently has moderate tooth pain- weaning off, throbbing intermittently, reports dentists didn't rx narcotic  We discussed tylenol being safe in pregnancy but we like to have lowest necessary dose for shortest amt of time; pt to change over to reg strength 650mg q6 hrs tylenol and see how that goes. Pt to talk to dentist tomorrow (was last seen Friday AM) to ask about topical lidocaine for mouth IF safe in pregnancy and for other comfort measures if this healing curve is normal.   Pt was wondering if safe to take her aspirin for her bleeding disorder (MTHFR/prothrombin mutation) while taking tylenol, and we discussed that it is as tylenol is not an NSAID like ibu.    Pt will keep us posted, agrees to call back with updates or other medication questions that arise. She has no further questions at this time.

## 2018-04-30 NOTE — TELEPHONE ENCOUNTER
Reason for Call:  Other call back    Detailed comments: Patient is wondering if she can take Tylenol. She is about 6 months.    Phone Number Patient can be reached at: Cell number on file:    Telephone Information:   Mobile [phone]       Best Time: today    Can we leave a detailed message on this number? YES    Call taken on 4/30/2018 at 2:34 PM by Coirne Avilez

## 2018-05-14 ENCOUNTER — PRENATAL OFFICE VISIT (OUTPATIENT)
Dept: OBGYN | Facility: CLINIC | Age: 33
End: 2018-05-14
Payer: COMMERCIAL

## 2018-05-14 VITALS — WEIGHT: 197 LBS | SYSTOLIC BLOOD PRESSURE: 106 MMHG | DIASTOLIC BLOOD PRESSURE: 62 MMHG | BODY MASS INDEX: 33.81 KG/M2

## 2018-05-14 DIAGNOSIS — A60.04 HERPES SIMPLEX VULVOVAGINITIS: ICD-10-CM

## 2018-05-14 DIAGNOSIS — Z36.9 ENCOUNTER FOR ANTENATAL SCREENING OF MOTHER: Primary | ICD-10-CM

## 2018-05-14 DIAGNOSIS — E03.9 HYPOTHYROID IN PREGNANCY, ANTEPARTUM: ICD-10-CM

## 2018-05-14 DIAGNOSIS — O99.280 HYPOTHYROID IN PREGNANCY, ANTEPARTUM: ICD-10-CM

## 2018-05-14 DIAGNOSIS — Z23 NEED FOR TDAP VACCINATION: ICD-10-CM

## 2018-05-14 DIAGNOSIS — D68.52 PROTHROMBIN GENE MUTATION (H): ICD-10-CM

## 2018-05-14 DIAGNOSIS — Z29.13 NEED FOR RHOGAM DUE TO RH NEGATIVE MOTHER: ICD-10-CM

## 2018-05-14 DIAGNOSIS — O09.93 SUPERVISION OF HIGH RISK PREGNANCY IN THIRD TRIMESTER: Primary | ICD-10-CM

## 2018-05-14 LAB
GLUCOSE 1H P 50 G GLC PO SERPL-MCNC: 128 MG/DL (ref 60–129)
HGB BLD-MCNC: 12.9 G/DL (ref 11.7–15.7)
TSH SERPL DL<=0.005 MIU/L-ACNC: 1.36 MU/L (ref 0.4–4)

## 2018-05-14 PROCEDURE — 36415 COLL VENOUS BLD VENIPUNCTURE: CPT | Performed by: OBSTETRICS & GYNECOLOGY

## 2018-05-14 PROCEDURE — 90471 IMMUNIZATION ADMIN: CPT

## 2018-05-14 PROCEDURE — 96372 THER/PROPH/DIAG INJ SC/IM: CPT

## 2018-05-14 PROCEDURE — 99207 ZZC PRENATAL VISIT: CPT | Performed by: OBSTETRICS & GYNECOLOGY

## 2018-05-14 PROCEDURE — 90715 TDAP VACCINE 7 YRS/> IM: CPT

## 2018-05-14 PROCEDURE — 84443 ASSAY THYROID STIM HORMONE: CPT | Performed by: OBSTETRICS & GYNECOLOGY

## 2018-05-14 PROCEDURE — 86850 RBC ANTIBODY SCREEN: CPT | Performed by: OBSTETRICS & GYNECOLOGY

## 2018-05-14 PROCEDURE — 00000218 ZZHCL STATISTIC OBHBG - HEMOGLOBIN: Performed by: OBSTETRICS & GYNECOLOGY

## 2018-05-14 PROCEDURE — 82950 GLUCOSE TEST: CPT | Performed by: OBSTETRICS & GYNECOLOGY

## 2018-05-14 NOTE — PROGRESS NOTES
Syphilis is a sexually transmitted disease that can cause birth defects in the babies of untreated mothers. Every pregnant patient is tested for syphilis early in each pregnancy as part of the routine lab work. The [place] Department of [facility] has seen an increase in the rate of syphilis in [place]. The [facility] now recommends testing for syphilis 3 times during a pregnancy, the new prenatal visit, 28 weeks and when admitted for delivery. Patient declines lab testing for syphilis.

## 2018-05-14 NOTE — MR AVS SNAPSHOT
After Visit Summary   5/14/2018    Sigrid Mann    MRN: [id number]           Patient Information     Date Of Birth          1985        Visit Information        Provider Department      5/14/2018 8:50 AM Estefani Rboles MD [facility] [place]        Today's Diagnoses     Supervision of high risk pregnancy in third trimester    -  1    Hypothyroid in pregnancy, antepartum           Follow-ups after your visit        Your next 10 appointments already scheduled     May 30, 2018  8:30 AM CDT   ESTABLISHED PRENATAL with Estefani Robles MD   [facility] [address] [facility] [place] ([facility] [place])    [address]  [place] [address]   [phone]            [place] 15, 2018  9:50 AM CDT   ESTABLISHED PRENATAL with Estefani Robles MD   [facility] [address] [facility] [place] ([facility] [address] [facility] [place])    [address]  [place] [address]   [phone]              Who to contact     If you have questions or need follow up information about today's clinic visit or your schedule please contact [facility] [place] directly at [phone].  Normal or non-critical lab and imaging results will be communicated to you by Savvy Serviceshart, letter or phone within 4 business days after the clinic has received the results. If you do not hear from us within 7 days, please contact the clinic through Savvy Serviceshart or phone. If you have a critical or abnormal lab result, we will notify you by phone as soon as possible.  Submit refill requests through Hactus or call your pharmacy and they will forward the refill request to us. Please allow 3 business days for your refill to be completed.          Additional Information About Your Visit        MyChart Information     Hactus gives you secure access to your electronic health record. If you see a primary care provider, you can also send messages to your care team and make appointments. If you have questions, please  call your primary care clinic.  If you do not have a primary care provider, please call [phone] and they will assist you.        Care EveryWhere ID     This is your Care EveryWhere ID. This could be used by other organizations to access your [place] medical records  [id number]        Your Vitals Were     Last Period BMI (Body Mass Index)                10/09/2017 33.81 kg/m2           Blood Pressure from Last 3 Encounters:   05/14/18 106/62   04/09/18 120/66   03/05/18 120/66    Weight from Last 3 Encounters:   05/14/18 197 lb (89.4 kg)   04/09/18 195 lb (88.5 kg)   03/05/18 185 lb (83.9 kg)              Today, you had the following     No orders found for display       Primary Care Provider Office Phone # Fax #    Estefani Robles [phone] [address]  [place] [address]        Equal Access to Services     [facility] Center[facility]ÍAS : Hadii aad ku hadasho Soomaali, waaxda luqadaha, qaybta kaalmada adeegyada, whitney romero . So [facility] [phone].    ATENCIÓN: Si habla español, tiene a celis disposición servicios gratuitos de asistencia lingüística. Llame al [phone].    We comply with applicable federal civil rights laws and [place] laws. We do not discriminate on the basis of race, color, national origin, age, disability, sex, sexual orientation, or gender identity.            Thank you!     Thank you for choosing [facility] [place]  for your care. Our goal is always to provide you with excellent care. Hearing back from our patients is one way we can continue to improve our services. Please take a few minutes to complete the written survey that you may receive in the mail after your visit with us. Thank you!             Your Updated Medication List - Protect others around you: Learn how to safely use, store and throw away your medicines at www.disposemymeds.org.          This list is accurate as of 5/14/18 10:16 AM.  Always use your most recent  med list.                   Brand Name Dispense Instructions for use Diagnosis    folic acid 1 MG tablet    FOLVITE    100 tablet    TAKE 4 TABLET(4,000 MCG) BY MOUTH DAILY.    Blood clotting factor deficiency disorder (H)       levothyroxine 75 MCG tablet    SYNTHROID/LEVOTHROID    90 tablet    Take 1 tablet (75 mcg) by mouth daily    Hypothyroidism, unspecified type

## 2018-05-15 LAB
BLD GP AB SCN SERPL QL: NORMAL
BLD GP AB SCN TITR SERPL: NORMAL {TITER}

## 2018-05-15 RX ORDER — LEVOTHYROXINE SODIUM 75 UG/1
75 TABLET ORAL DAILY
Qty: 90 TABLET | Refills: 1 | Status: SHIPPED | OUTPATIENT
Start: 2018-05-15 | End: 2018-07-01

## 2018-05-15 NOTE — PROGRESS NOTES
Patient has done much better on weight gain and up only 2# in 5 weeks  Great FM, no ctx  Has noted that she's snoring a lot and her BF is noticing that she seems to catch her breath a lot more. Reassured this is normal in pregnancy and to try to elevate her pillows/head and sleep on her sides rather than back. Patient is not aware of any breath holding  Patient is having her GCT,hgb, tdap today. Also doing her TSH and then can adjust meds as needed. Was bumped after her NOB to 75mcg  Discussed again the risks of HSV transmission to fetus in light of never having an outbreak and taking valtrex in the last month. Patient understands studies quoted from 10-54 per 100,000 and that is all commers. This gives her a little more peace of mind but still struggles with wanting a primary c/s. Will keep thinking about it but leaning more towards  at this point  Will also be slowly moving through the summer and sept so easier recovery from a vaginal delivery would be ideal  Return 2 weeks.

## 2018-05-30 ENCOUNTER — PRENATAL OFFICE VISIT (OUTPATIENT)
Dept: OBGYN | Facility: CLINIC | Age: 33
End: 2018-05-30
Payer: COMMERCIAL

## 2018-05-30 VITALS — DIASTOLIC BLOOD PRESSURE: 68 MMHG | SYSTOLIC BLOOD PRESSURE: 118 MMHG | WEIGHT: 198 LBS | BODY MASS INDEX: 33.99 KG/M2

## 2018-05-30 DIAGNOSIS — D68.52 PROTHROMBIN GENE MUTATION (H): ICD-10-CM

## 2018-05-30 DIAGNOSIS — A60.04 HERPES SIMPLEX VULVOVAGINITIS: ICD-10-CM

## 2018-05-30 DIAGNOSIS — E03.9 HYPOTHYROID IN PREGNANCY, ANTEPARTUM: ICD-10-CM

## 2018-05-30 DIAGNOSIS — O99.280 HYPOTHYROID IN PREGNANCY, ANTEPARTUM: ICD-10-CM

## 2018-05-30 DIAGNOSIS — O09.93 SUPERVISION OF HIGH RISK PREGNANCY IN THIRD TRIMESTER: Primary | ICD-10-CM

## 2018-05-30 PROCEDURE — 99207 ZZC PRENATAL VISIT: CPT | Performed by: OBSTETRICS & GYNECOLOGY

## 2018-05-30 NOTE — PROGRESS NOTES
Doing well but definitely starting to get more uncomfortable  Swelling just a little but not bad. Weight gain has definitely slowed in the last 6 weeks which is great  Last TSH was 1.38. Plan to rechk at 36 weeks again  FH appropriate  Discussed SKYLER class at 37 weeks  Found a   Never did contraception b/c though Marv was sterile so wants to know options. Discussed options with nursing and when not. Given mirena info  Return 2 weeks.

## 2018-05-30 NOTE — MR AVS SNAPSHOT
After Visit Summary   5/30/2018    Sigrid Mann    MRN: [id number]           Patient Information     Date Of Birth          1985        Visit Information        Provider Department      5/30/2018 8:30 AM Estefani Robles MD [facility] [place]        Today's Diagnoses     Supervision of high risk pregnancy in third trimester    -  1    Prothrombin gene mutation (H)        Hypothyroid in pregnancy, antepartum        Herpes simplex vulvovaginitis           Follow-ups after your visit        Your next 10 appointments already scheduled     [place] 15, 2018  9:50 AM CDT   ESTABLISHED PRENATAL with Estefani Robles MD   [facility] [place] ([facility] [place])    [address]  [place] [address]   [phone]            Jun 29, 2018  8:30 AM CDT   ESTABLISHED PRENATAL with Estefani Robles MD   [facility] [place] ([facility] [place])    [address]  [place] [address]   [phone]            Jul 13, 2018  9:10 AM CDT   ESTABLISHED PRENATAL with Estefani Robles MD   [facility] [place] ([facility] [place])    [address]  [place] [address]   [phone]              Who to contact     If you have questions or need follow up information about today's clinic visit or your schedule please contact [facility] [place] directly at [phone].  Normal or non-critical lab and imaging results will be communicated to you by MyChart, letter or phone within 4 business days after the clinic has received the results. If you do not hear from us within 7 days, please contact the clinic through MyChart or phone. If you have a critical or abnormal lab result, we will notify you by phone as soon as possible.  Submit refill requests through Blast Ramp or call your pharmacy and they will forward the refill request to us. Please allow 3 business days for your  refill to be completed.          Additional Information About Your Visit        MyChart Information     RedFlag Software gives you secure access to your electronic health record. If you see a primary care provider, you can also send messages to your care team and make appointments. If you have questions, please call your primary care clinic.  If you do not have a primary care provider, please call [phone] and they will assist you.        Care EveryWhere ID     This is your Care EveryWhere ID. This could be used by other organizations to access your [place] medical records  [id number]        Your Vitals Were     Last Period Breastfeeding? BMI (Body Mass Index)             10/09/2017 No 33.99 kg/m2          Blood Pressure from Last 3 Encounters:   05/30/18 118/68   05/14/18 106/62   04/09/18 120/66    Weight from Last 3 Encounters:   05/30/18 198 lb (89.8 kg)   05/14/18 197 lb (89.4 kg)   04/09/18 195 lb (88.5 kg)              Today, you had the following     No orders found for display       Primary Care Provider Office Phone # Fax #    Estefani Robles [phone] [address] [facility] [address]  [facility] [address]        Equal Access to Services     MAGAN PEOPLES AH: Hadii erik mooreo Somarielle, waaxda luminh, qaybta kaalmada adeiqrayada, whitney pacheco. So [facility] [phone].    ATENCIÓN: Si habla español, tiene a celis disposición servicios gratuitos de asistencia lingüística. Llame al [phone].    We comply with applicable federal civil rights laws and [place] laws. We do not discriminate on the basis of race, color, national origin, age, disability, sex, sexual orientation, or gender identity.            Thank you!     Thank you for choosing [facility] [place]  for your care. Our goal is always to provide you with excellent care. Hearing back from our patients is one way we can continue to improve our services. Please take a few minutes to complete the written  survey that you may receive in the mail after your visit with us. Thank you!             Your Updated Medication List - Protect others around you: Learn how to safely use, store and throw away your medicines at www.disposemymeds.org.          This list is accurate as of 5/30/18  9:31 AM.  Always use your most recent med list.                   Brand Name Dispense Instructions for use Diagnosis    folic acid 1 MG tablet    FOLVITE    100 tablet    TAKE 4 TABLET(4,000 MCG) BY MOUTH DAILY.    Blood clotting factor deficiency disorder (H)       levothyroxine 75 MCG tablet    SYNTHROID/LEVOTHROID    90 tablet    Take 1 tablet (75 mcg) by mouth daily    Hypothyroid in pregnancy, antepartum

## 2018-06-15 ENCOUNTER — PRENATAL OFFICE VISIT (OUTPATIENT)
Dept: OBGYN | Facility: CLINIC | Age: 33
End: 2018-06-15
Payer: COMMERCIAL

## 2018-06-15 VITALS — BODY MASS INDEX: 34.91 KG/M2 | DIASTOLIC BLOOD PRESSURE: 76 MMHG | SYSTOLIC BLOOD PRESSURE: 110 MMHG | WEIGHT: 203.4 LBS

## 2018-06-15 DIAGNOSIS — O12.00 EDEMA IN PREGNANCY, ANTEPARTUM: ICD-10-CM

## 2018-06-15 DIAGNOSIS — O99.713 PRURITUS OF PREGNANCY IN THIRD TRIMESTER: ICD-10-CM

## 2018-06-15 DIAGNOSIS — A60.04 HERPES SIMPLEX VULVOVAGINITIS: ICD-10-CM

## 2018-06-15 DIAGNOSIS — O26.843 UTERINE SIZE-DATE DISCREPANCY IN THIRD TRIMESTER: ICD-10-CM

## 2018-06-15 DIAGNOSIS — E03.9 HYPOTHYROID IN PREGNANCY, ANTEPARTUM: ICD-10-CM

## 2018-06-15 DIAGNOSIS — O99.280 HYPOTHYROID IN PREGNANCY, ANTEPARTUM: ICD-10-CM

## 2018-06-15 DIAGNOSIS — O09.93 SUPERVISION OF HIGH RISK PREGNANCY IN THIRD TRIMESTER: Primary | ICD-10-CM

## 2018-06-15 DIAGNOSIS — L29.9 PRURITUS OF PREGNANCY IN THIRD TRIMESTER: ICD-10-CM

## 2018-06-15 LAB
ERYTHROCYTE [DISTWIDTH] IN BLOOD BY AUTOMATED COUNT: 14.1 % (ref 10–15)
HCT VFR BLD AUTO: 39.5 % (ref 35–47)
HGB BLD-MCNC: 13.4 G/DL (ref 11.7–15.7)
MCH RBC QN AUTO: 30.5 PG (ref 26.5–33)
MCHC RBC AUTO-ENTMCNC: 33.9 G/DL (ref 31.5–36.5)
MCV RBC AUTO: 90 FL (ref 78–100)
PLATELET # BLD AUTO: 271 10E9/L (ref 150–450)
PROT UR-MCNC: 0.19 G/L
PROT/CREAT 24H UR: 0.17 G/G CR (ref 0–0.2)
RBC # BLD AUTO: 4.4 10E12/L (ref 3.8–5.2)
WBC # BLD AUTO: 10 10E9/L (ref 4–11)

## 2018-06-15 PROCEDURE — 85027 COMPLETE CBC AUTOMATED: CPT | Performed by: OBSTETRICS & GYNECOLOGY

## 2018-06-15 PROCEDURE — 84156 ASSAY OF PROTEIN URINE: CPT | Performed by: OBSTETRICS & GYNECOLOGY

## 2018-06-15 PROCEDURE — 99207 ZZC PRENATAL VISIT: CPT | Performed by: OBSTETRICS & GYNECOLOGY

## 2018-06-15 PROCEDURE — 82239 BILE ACIDS TOTAL: CPT | Mod: 90 | Performed by: OBSTETRICS & GYNECOLOGY

## 2018-06-15 PROCEDURE — 80053 COMPREHEN METABOLIC PANEL: CPT | Performed by: OBSTETRICS & GYNECOLOGY

## 2018-06-15 PROCEDURE — 36415 COLL VENOUS BLD VENIPUNCTURE: CPT | Performed by: OBSTETRICS & GYNECOLOGY

## 2018-06-15 PROCEDURE — 99000 SPECIMEN HANDLING OFFICE-LAB: CPT | Performed by: OBSTETRICS & GYNECOLOGY

## 2018-06-15 RX ORDER — VALACYCLOVIR HYDROCHLORIDE 500 MG/1
500 TABLET, FILM COATED ORAL DAILY
Qty: 60 TABLET | Refills: 0 | Status: ON HOLD | OUTPATIENT
Start: 2018-06-15 | End: 2018-07-20

## 2018-06-15 NOTE — MR AVS SNAPSHOT
After Visit Summary   6/15/2018    Sigrid Mann    MRN: [id number]           Patient Information     Date Of Birth          1985        Visit Information        Provider Department      6/15/2018 9:50 AM Estefani Robles MD [facility] [place]        Today's Diagnoses     Supervision of high risk pregnancy in third trimester    -  1    Edema in pregnancy, antepartum        Pruritus of pregnancy in third trimester           Follow-ups after your visit        Your next 10 appointments already scheduled     Jun 29, 2018  8:30 AM CDT   ESTABLISHED PRENATAL with Estefani Robles MD   [facility] [place] ([facility] [place])    [address] [place] [address] [address]  [place] [address]   [phone]            Jul 13, 2018  9:10 AM CDT   ESTABLISHED PRENATAL with Estefani Robles MD   [facility] [place] ([facility] [place])    [address] [place] [address] [address]  [place] [address]   [phone]            Jul 20, 2018  9:30 AM CDT   ESTABLISHED PRENATAL with Estefani Robles MD   [facility] [place] ([facility] [place])    [address]  [place] [address]   [phone]              Who to contact     If you have questions or need follow up information about today's clinic visit or your schedule please contact [facility] [place] directly at [phone].  Normal or non-critical lab and imaging results will be communicated to you by MyChart, letter or phone within 4 business days after the clinic has received the results. If you do not hear from us within 7 days, please contact the clinic through MyChart or phone. If you have a critical or abnormal lab result, we will notify you by phone as soon as possible.  Submit refill requests through iHealthHome or call your pharmacy and they will forward the refill request to us. Please allow 3 business days for your refill to be completed.           Additional Information About Your Visit        RegistryLovehart Information     Eyepic gives you secure access to your electronic health record. If you see a primary care provider, you can also send messages to your care team and make appointments. If you have questions, please call your primary care clinic.  If you do not have a primary care provider, please call [phone] and they will assist you.        Care EveryWhere ID     This is your Care EveryWhere ID. This could be used by other organizations to access your [place] medical records  [id number]        Your Vitals Were     Last Period BMI (Body Mass Index)                10/09/2017 34.91 kg/m2           Blood Pressure from Last 3 Encounters:   06/15/18 110/76   05/30/18 118/68   05/14/18 106/62    Weight from Last 3 Encounters:   06/15/18 203 lb 6.4 oz (92.3 kg)   05/30/18 198 lb (89.8 kg)   05/14/18 197 lb (89.4 kg)              We Performed the Following     Bile acids total     CBC with platelets     Comprehensive metabolic panel     Protein  random urine with Creat Ratio        Primary Care Provider Fax #    Physician No Ref-Primary [phone]       No address on file        Equal Access to Services     MAGAN PEOPLES : Hadii erik Owens, wabreeda juan carlos, qasabrinata kaalmadaniel yuan, whitney romero . So [facility] [phone].    ATENCIÓN: Si habla español, tiene a celis disposición servicios gratLos Alamos Medical Centeros de asistencia lingüística. Llame al [phone].    We comply with applicable federal civil rights laws and [place] laws. We do not discriminate on the basis of race, color, national origin, age, disability, sex, sexual orientation, or gender identity.            Thank you!     Thank you for choosing [facility] [place]  for your care. Our goal is always to provide you with excellent care. Hearing back from our patients is one way we can continue to improve our services. Please take a few minutes to complete the  written survey that you may receive in the mail after your visit with us. Thank you!             Your Updated Medication List - Protect others around you: Learn how to safely use, store and throw away your medicines at www.disposemymeds.org.          This list is accurate as of 6/15/18 10:56 AM.  Always use your most recent med list.                   Brand Name Dispense Instructions for use Diagnosis    folic acid 1 MG tablet    FOLVITE    100 tablet    TAKE 4 TABLET(4,000 MCG) BY MOUTH DAILY.    Blood clotting factor deficiency disorder (H)       levothyroxine 75 MCG tablet    SYNTHROID/LEVOTHROID    90 tablet    Take 1 tablet (75 mcg) by mouth daily    Hypothyroid in pregnancy, antepartum

## 2018-06-15 NOTE — PROGRESS NOTES
Patient is having much more swelling the last 2 weeks. Feet are at about 2+ and then just trace to at most 1+ above the ankles  Face is looking more puffy as well.  BP is totally normal and no proteinuria. No pre-e sx at all and did discuss what si/sx to look and call in for if she were to get them  In addition to this is having horrible itching the last couple weeks. Was so bad that had to leave work one day last week  Arms and hands and feet especially and got to the point where she was using a water bottle cap to scratch herself. No rash seen.  Patient is using an eczema lotion otc, nothing scented and nothing with cocoa butter. No changes in soaps or detergents  Discussed cholestasis. Will do full pre-e labs and bile salts and assess for that. If neg then can use benadryl and f/u in 2 weeks  If does have cholestasis then would need to f/u in 1 week and start on meds  FH is 2 cm large for the first time. Would do a growth u/s either in 1 or 2 weeks, whenever we get her next appointment scheduled based on her lab results.  Start daily suppressive valtrex now in case of early delivery  Check TSH at 36 wks

## 2018-06-16 LAB
ALBUMIN SERPL-MCNC: 2.4 G/DL (ref 3.4–5)
ALP SERPL-CCNC: 182 U/L (ref 40–150)
ALT SERPL W P-5'-P-CCNC: 21 U/L (ref 0–50)
ANION GAP SERPL CALCULATED.3IONS-SCNC: 8 MMOL/L (ref 3–14)
AST SERPL W P-5'-P-CCNC: 20 U/L (ref 0–45)
BILE AC SERPL-SCNC: 3 UMOL/L (ref 0–10)
BILIRUB SERPL-MCNC: 0.6 MG/DL (ref 0.2–1.3)
BUN SERPL-MCNC: 8 MG/DL (ref 7–30)
CALCIUM SERPL-MCNC: 9.3 MG/DL (ref 8.5–10.1)
CHLORIDE SERPL-SCNC: 109 MMOL/L (ref 94–109)
CO2 SERPL-SCNC: 22 MMOL/L (ref 20–32)
CREAT SERPL-MCNC: 0.64 MG/DL (ref 0.52–1.04)
GFR SERPL CREATININE-BSD FRML MDRD: >90 ML/MIN/1.7M2
GLUCOSE SERPL-MCNC: 79 MG/DL (ref 70–99)
POTASSIUM SERPL-SCNC: 4.2 MMOL/L (ref 3.4–5.3)
PROT SERPL-MCNC: 6.8 G/DL (ref 6.8–8.8)
SODIUM SERPL-SCNC: 139 MMOL/L (ref 133–144)

## 2018-06-29 ENCOUNTER — RADIANT APPOINTMENT (OUTPATIENT)
Dept: ULTRASOUND IMAGING | Facility: CLINIC | Age: 33
End: 2018-06-29
Payer: COMMERCIAL

## 2018-06-29 ENCOUNTER — PRENATAL OFFICE VISIT (OUTPATIENT)
Dept: OBGYN | Facility: CLINIC | Age: 33
End: 2018-06-29
Payer: COMMERCIAL

## 2018-06-29 VITALS — WEIGHT: 207 LBS | SYSTOLIC BLOOD PRESSURE: 114 MMHG | DIASTOLIC BLOOD PRESSURE: 74 MMHG | BODY MASS INDEX: 35.53 KG/M2

## 2018-06-29 DIAGNOSIS — E03.9 HYPOTHYROID IN PREGNANCY, ANTEPARTUM: ICD-10-CM

## 2018-06-29 DIAGNOSIS — A60.04 HERPES SIMPLEX VULVOVAGINITIS: ICD-10-CM

## 2018-06-29 DIAGNOSIS — Z36.85 ANTENATAL SCREENING FOR STREPTOCOCCUS B: ICD-10-CM

## 2018-06-29 DIAGNOSIS — O26.843 UTERINE SIZE-DATE DISCREPANCY IN THIRD TRIMESTER: ICD-10-CM

## 2018-06-29 DIAGNOSIS — O99.713 PRURITUS OF PREGNANCY IN THIRD TRIMESTER: ICD-10-CM

## 2018-06-29 DIAGNOSIS — L29.9 PRURITUS OF PREGNANCY IN THIRD TRIMESTER: ICD-10-CM

## 2018-06-29 DIAGNOSIS — O99.280 HYPOTHYROID IN PREGNANCY, ANTEPARTUM: ICD-10-CM

## 2018-06-29 DIAGNOSIS — O09.93 SUPERVISION OF HIGH RISK PREGNANCY IN THIRD TRIMESTER: Primary | ICD-10-CM

## 2018-06-29 LAB — TSH SERPL DL<=0.005 MIU/L-ACNC: 4.09 MU/L (ref 0.4–4)

## 2018-06-29 PROCEDURE — 99000 SPECIMEN HANDLING OFFICE-LAB: CPT | Performed by: OBSTETRICS & GYNECOLOGY

## 2018-06-29 PROCEDURE — 76816 OB US FOLLOW-UP PER FETUS: CPT | Performed by: OBSTETRICS & GYNECOLOGY

## 2018-06-29 PROCEDURE — 36415 COLL VENOUS BLD VENIPUNCTURE: CPT | Performed by: OBSTETRICS & GYNECOLOGY

## 2018-06-29 PROCEDURE — 82239 BILE ACIDS TOTAL: CPT | Mod: 90 | Performed by: OBSTETRICS & GYNECOLOGY

## 2018-06-29 PROCEDURE — 84443 ASSAY THYROID STIM HORMONE: CPT | Performed by: OBSTETRICS & GYNECOLOGY

## 2018-06-29 PROCEDURE — 99207 ZZC PRENATAL VISIT: CPT | Performed by: OBSTETRICS & GYNECOLOGY

## 2018-06-29 PROCEDURE — 87653 STREP B DNA AMP PROBE: CPT | Performed by: OBSTETRICS & GYNECOLOGY

## 2018-06-29 NOTE — PROGRESS NOTES
Growth u/s done d/t rapid weight gain more recently and larger FH but weight is normal at 5-15#=36% and ELINA is normal as well  Good FM. No Bh that she's aware of but lots of vaginal pressure  Has a lot of pain across upper abd that is like a burning. Reassured normal muscle/nerve stretching  Still itching a lot but not as much as she was. Bile acids normal at last check but will chcek once more today  On valtrex for 2-3 weeks already and tolerating it fine.  Will check TSH once more today  cvx is posterior and patient is too uncomfortable to get to it. Head is quite low though  Return 1 wk

## 2018-06-29 NOTE — MR AVS SNAPSHOT
After Visit Summary   2018    Sigrid Mann    MRN: [id number]           Patient Information     Date Of Birth          1985        Visit Information        Provider Department      2018 8:30 AM Estefani Robles MD [facility] [place]        Today's Diagnoses     Supervision of high risk pregnancy in third trimester    -  1     screening for streptococcus B        Hypothyroid in pregnancy, antepartum        Herpes simplex vulvovaginitis        Pruritus of pregnancy in third trimester           Follow-ups after your visit        Your next 10 appointments already scheduled     2018  9:10 AM CDT   ESTABLISHED PRENATAL with Estefani Robles MD   [facility] [place] ([facility] [place])    [address]  [place] [address] [phone]            2018  9:30 AM CDT   ESTABLISHED PRENATAL with Estefani Robles MD   [facility] [place] ([facility] [place])    [address]  [place] [address] [phone]              Who to contact     If you have questions or need follow up information about today's clinic visit or your schedule please contact [facility] [place] directly at [phone].  Normal or non-critical lab and imaging results will be communicated to you by MyChart, letter or phone within 4 business days after the clinic has received the results. If you do not hear from us within 7 days, please contact the clinic through MyChart or phone. If you have a critical or abnormal lab result, we will notify you by phone as soon as possible.  Submit refill requests through edupristine or call your pharmacy and they will forward the refill request to us. Please allow 3 business days for your refill to be completed.          Additional Information About Your Visit        HaitaobeiharCommand Information Information     edupristine gives you secure access to your electronic health  record. If you see a primary care provider, you can also send messages to your care team and make appointments. If you have questions, please call your primary care clinic.  If you do not have a primary care provider, please call [phone] and they will assist you.        Care EveryWhere ID     This is your Care EveryWhere ID. This could be used by other organizations to access your [place] medical records  [id number]        Your Vitals Were     Last Period BMI (Body Mass Index)                10/09/2017 35.53 kg/m2           Blood Pressure from Last 3 Encounters:   06/29/18 114/74   06/15/18 110/76   05/30/18 118/68    Weight from Last 3 Encounters:   06/29/18 207 lb (93.9 kg)   06/15/18 203 lb 6.4 oz (92.3 kg)   05/30/18 198 lb (89.8 kg)              We Performed the Following     Bile acids total     Strep, Group B by PCR     TSH        Primary Care Provider Fax #    Physician No Ref-Primary [phone]       No address on file        Equal Access to Services     MAGAN PEOPLES : Hadii erik ku hadasho Soomaali, waaxda luqadaha, qaybta kaalmada adeegyada, whitney romero . So [facility] [phone].    ATENCIÓN: Si habla español, tiene a celis disposición servicios gratuitos de asistencia lingüística. Llame al [phone].    We comply with applicable federal civil rights laws and [place] laws. We do not discriminate on the basis of race, color, national origin, age, disability, sex, sexual orientation, or gender identity.            Thank you!     Thank you for choosing [facility] [place]  for your care. Our goal is always to provide you with excellent care. Hearing back from our patients is one way we can continue to improve our services. Please take a few minutes to complete the written survey that you may receive in the mail after your visit with us. Thank you!             Your Updated Medication List - Protect others around you: Learn how to safely use, store and throw  away your medicines at www.disposemymeds.org.          This list is accurate as of 6/29/18  8:48 AM.  Always use your most recent med list.                   Brand Name Dispense Instructions for use Diagnosis    folic acid 1 MG tablet    FOLVITE    100 tablet    TAKE 4 TABLET(4,000 MCG) BY MOUTH DAILY.    Blood clotting factor deficiency disorder (H)       levothyroxine 75 MCG tablet    SYNTHROID/LEVOTHROID    90 tablet    Take 1 tablet (75 mcg) by mouth daily    Hypothyroid in pregnancy, antepartum       valACYclovir 500 MG tablet    VALTREX    60 tablet    Take 1 tablet (500 mg) by mouth daily    Herpes simplex vulvovaginitis

## 2018-06-30 LAB
BILE AC SERPL-SCNC: 4 UMOL/L (ref 0–10)
GP B STREP DNA SPEC QL NAA+PROBE: NEGATIVE
SPECIMEN SOURCE: NORMAL

## 2018-07-01 RX ORDER — LEVOTHYROXINE SODIUM 88 UG/1
75 TABLET ORAL DAILY
Qty: 90 TABLET | Refills: 0 | Status: SHIPPED | OUTPATIENT
Start: 2018-07-01 | End: 2018-10-02

## 2018-07-13 ENCOUNTER — PRENATAL OFFICE VISIT (OUTPATIENT)
Dept: OBGYN | Facility: CLINIC | Age: 33
End: 2018-07-13
Payer: COMMERCIAL

## 2018-07-13 VITALS — WEIGHT: 207.8 LBS | BODY MASS INDEX: 35.67 KG/M2 | SYSTOLIC BLOOD PRESSURE: 110 MMHG | DIASTOLIC BLOOD PRESSURE: 50 MMHG

## 2018-07-13 DIAGNOSIS — A60.04 HERPES SIMPLEX VULVOVAGINITIS: ICD-10-CM

## 2018-07-13 DIAGNOSIS — E03.9 HYPOTHYROID IN PREGNANCY, ANTEPARTUM: ICD-10-CM

## 2018-07-13 DIAGNOSIS — O09.93 SUPERVISION OF HIGH RISK PREGNANCY IN THIRD TRIMESTER: Primary | ICD-10-CM

## 2018-07-13 DIAGNOSIS — O99.280 HYPOTHYROID IN PREGNANCY, ANTEPARTUM: ICD-10-CM

## 2018-07-13 PROCEDURE — 99207 ZZC PRENATAL VISIT: CPT | Performed by: OBSTETRICS & GYNECOLOGY

## 2018-07-13 RX ORDER — LEVOTHYROXINE SODIUM 75 UG/1
TABLET ORAL
COMMUNITY
Start: 2018-05-15 | End: 2018-07-13

## 2018-07-13 NOTE — PROGRESS NOTES
Had lots of tightenings that woke her up 2 days ago. Not painful and fell back to sleep. Generally uterus just feels hard and contracted for the last 4 days  Still quite swollen but her BP is good and urine is good. Weight is unchanged in 2 weeks so at least not worse  Ankles and arms still itching like crazy but no worse than it was and most of the time it's not really bad and then flares  Taking her valtrex. No sx of HSV  Up on fists her cvx is still very posterior. Feels fairly thick still but soft. Seems like possibly 1cm or so dilated. Head is low  Return 1 week  Reviewed si/sx of labor/prom to call in for and my call schedule and availability

## 2018-07-13 NOTE — MR AVS SNAPSHOT
After Visit Summary   7/13/2018    Sigrid Mann    MRN: [id number]           Patient Information     Date Of Birth          1985        Visit Information        Provider Department      7/13/2018 9:10 AM Estefani Robles MD [facility] [place]        Today's Diagnoses     Supervision of high risk pregnancy in third trimester    -  1    Hypothyroid in pregnancy, antepartum        Herpes simplex vulvovaginitis           Follow-ups after your visit        Your next 10 appointments already scheduled     Jul 20, 2018  9:30 AM CDT   ESTABLISHED PRENATAL with Estefani Robles MD   [facility] [place] ([facility])    [address]  [facility] [id number] [phone]              Who to contact     If you have questions or need follow up information about today's clinic visit or your schedule please contact [facility] [place] directly at [phone].  Normal or non-critical lab and imaging results will be communicated to you by MyChart, letter or phone within 4 business days after the clinic has received the results. If you do not hear from us within 7 days, please contact the clinic through Namsh[employer] or phone. If you have a critical or abnormal lab result, we will notify you by phone as soon as possible.  Submit refill requests through [employer] or call your pharmacy and they will forward the refill request to us. Please allow 3 business days for your refill to be completed.          Additional Information About Your Visit        MyChart Information     [employer] gives you secure access to your electronic health record. If you see a primary care provider, you can also send messages to your care team and make appointments. If you have questions, please call your primary care clinic.  If you do not have a primary care provider, please call [phone] and they will assist you.        Care EveryWhere ID     This is your Care  EveryWhere ID. This could be used by other organizations to access your [place] medical records  [id number]        Your Vitals Were     Last Period BMI (Body Mass Index)                10/09/2017 35.67 kg/m2           Blood Pressure from Last 3 Encounters:   07/13/18 110/50   06/29/18 114/74   06/15/18 110/76    Weight from Last 3 Encounters:   07/13/18 207 lb 12.8 oz (94.3 kg)   06/29/18 207 lb (93.9 kg)   06/15/18 203 lb 6.4 oz (92.3 kg)              Today, you had the following     No orders found for display       Primary Care Provider Fax #    Physician No Ref-Primary [phone]       No address on file        Equal Access to Services     MAGAN PEOPLES : Marianne Owens, wafidencio rangel, qaybta kaalmada lissy, whitney romero . So [facility] [phone].    ATENCIÓN: Si habla español, tiene a celis disposición servicios gratuitos de asistencia lingüística. Llame al [phone].    We comply with applicable federal civil rights laws and [place] laws. We do not discriminate on the basis of race, color, national origin, age, disability, sex, sexual orientation, or gender identity.            Thank you!     Thank you for choosing [facility] [place]  for your care. Our goal is always to provide you with excellent care. Hearing back from our patients is one way we can continue to improve our services. Please take a few minutes to complete the written survey that you may receive in the mail after your visit with us. Thank you!             Your Updated Medication List - Protect others around you: Learn how to safely use, store and throw away your medicines at www.disposemymeds.org.          This list is accurate as of 7/13/18 10:30 AM.  Always use your most recent med list.                   Brand Name Dispense Instructions for use Diagnosis    ASPIRIN 81 PO      Take 1 tablet by mouth daily        folic acid 1 MG tablet    FOLVITE    100 tablet    TAKE 4  TABLET(4,000 MCG) BY MOUTH DAILY.    Blood clotting factor deficiency disorder (H)       levothyroxine 88 MCG tablet    SYNTHROID/LEVOTHROID    90 tablet    Take 1 tablet (88 mcg) by mouth daily    Hypothyroid in pregnancy, antepartum       valACYclovir 500 MG tablet    VALTREX    60 tablet    Take 1 tablet (500 mg) by mouth daily    Herpes simplex vulvovaginitis

## 2018-07-17 ENCOUNTER — NURSE TRIAGE (OUTPATIENT)
Dept: NURSING | Facility: CLINIC | Age: 33
End: 2018-07-17

## 2018-07-17 ENCOUNTER — HOSPITAL ENCOUNTER (INPATIENT)
Facility: CLINIC | Age: 33
LOS: 3 days | Discharge: HOME HEALTH | End: 2018-07-20
Attending: OBSTETRICS & GYNECOLOGY | Admitting: OBSTETRICS & GYNECOLOGY
Payer: COMMERCIAL

## 2018-07-17 PROBLEM — Z37.9 NORMAL LABOR: Status: ACTIVE | Noted: 2018-07-17

## 2018-07-17 PROCEDURE — 36415 COLL VENOUS BLD VENIPUNCTURE: CPT | Performed by: OBSTETRICS & GYNECOLOGY

## 2018-07-17 PROCEDURE — 85025 COMPLETE CBC W/AUTO DIFF WBC: CPT | Performed by: OBSTETRICS & GYNECOLOGY

## 2018-07-17 PROCEDURE — 86901 BLOOD TYPING SEROLOGIC RH(D): CPT | Performed by: OBSTETRICS & GYNECOLOGY

## 2018-07-17 PROCEDURE — 86780 TREPONEMA PALLIDUM: CPT | Performed by: OBSTETRICS & GYNECOLOGY

## 2018-07-17 PROCEDURE — G0463 HOSPITAL OUTPT CLINIC VISIT: HCPCS | Mod: 25

## 2018-07-17 PROCEDURE — 40000809 ZZH STATISTIC NO DOCUMENTATION TO SUPPORT CHARGE

## 2018-07-17 PROCEDURE — 86870 RBC ANTIBODY IDENTIFICATION: CPT | Performed by: OBSTETRICS & GYNECOLOGY

## 2018-07-17 PROCEDURE — 12000029 ZZH R&B OB INTERMEDIATE

## 2018-07-17 PROCEDURE — 86900 BLOOD TYPING SEROLOGIC ABO: CPT | Performed by: OBSTETRICS & GYNECOLOGY

## 2018-07-17 PROCEDURE — 86850 RBC ANTIBODY SCREEN: CPT | Performed by: OBSTETRICS & GYNECOLOGY

## 2018-07-17 PROCEDURE — 59025 FETAL NON-STRESS TEST: CPT

## 2018-07-17 RX ORDER — LIDOCAINE 40 MG/G
CREAM TOPICAL
Status: DISCONTINUED | OUTPATIENT
Start: 2018-07-17 | End: 2018-07-19

## 2018-07-17 RX ORDER — ONDANSETRON 2 MG/ML
4 INJECTION INTRAMUSCULAR; INTRAVENOUS EVERY 6 HOURS PRN
Status: DISCONTINUED | OUTPATIENT
Start: 2018-07-17 | End: 2018-07-19

## 2018-07-17 RX ORDER — CARBOPROST TROMETHAMINE 250 UG/ML
250 INJECTION, SOLUTION INTRAMUSCULAR
Status: DISCONTINUED | OUTPATIENT
Start: 2018-07-17 | End: 2018-07-19

## 2018-07-17 RX ORDER — NALOXONE HYDROCHLORIDE 0.4 MG/ML
.1-.4 INJECTION, SOLUTION INTRAMUSCULAR; INTRAVENOUS; SUBCUTANEOUS
Status: DISCONTINUED | OUTPATIENT
Start: 2018-07-17 | End: 2018-07-19

## 2018-07-17 RX ORDER — OXYTOCIN/0.9 % SODIUM CHLORIDE 30/500 ML
100-340 PLASTIC BAG, INJECTION (ML) INTRAVENOUS CONTINUOUS PRN
Status: COMPLETED | OUTPATIENT
Start: 2018-07-17 | End: 2018-07-18

## 2018-07-17 RX ORDER — SODIUM CHLORIDE, SODIUM LACTATE, POTASSIUM CHLORIDE, CALCIUM CHLORIDE 600; 310; 30; 20 MG/100ML; MG/100ML; MG/100ML; MG/100ML
INJECTION, SOLUTION INTRAVENOUS CONTINUOUS
Status: DISCONTINUED | OUTPATIENT
Start: 2018-07-17 | End: 2018-07-19

## 2018-07-17 RX ORDER — ACETAMINOPHEN 325 MG/1
650 TABLET ORAL EVERY 4 HOURS PRN
Status: DISCONTINUED | OUTPATIENT
Start: 2018-07-17 | End: 2018-07-19

## 2018-07-17 RX ORDER — METHYLERGONOVINE MALEATE 0.2 MG/ML
200 INJECTION INTRAVENOUS
Status: DISCONTINUED | OUTPATIENT
Start: 2018-07-17 | End: 2018-07-19

## 2018-07-17 RX ORDER — OXYCODONE AND ACETAMINOPHEN 5; 325 MG/1; MG/1
1 TABLET ORAL
Status: DISCONTINUED | OUTPATIENT
Start: 2018-07-17 | End: 2018-07-19

## 2018-07-17 RX ORDER — OXYTOCIN 10 [USP'U]/ML
10 INJECTION, SOLUTION INTRAMUSCULAR; INTRAVENOUS
Status: DISCONTINUED | OUTPATIENT
Start: 2018-07-17 | End: 2018-07-19

## 2018-07-17 RX ORDER — FENTANYL CITRATE 50 UG/ML
50-100 INJECTION, SOLUTION INTRAMUSCULAR; INTRAVENOUS
Status: DISCONTINUED | OUTPATIENT
Start: 2018-07-17 | End: 2018-07-19

## 2018-07-17 RX ORDER — IBUPROFEN 400 MG/1
800 TABLET, FILM COATED ORAL
Status: COMPLETED | OUTPATIENT
Start: 2018-07-17 | End: 2018-07-19

## 2018-07-17 NOTE — IP AVS SNAPSHOT
[address]    [place] [address]    Phone:  [phone]                                       After Visit Summary   7/17/2018    Sigrid Mann    MRN: [id number]           After Visit Summary Signature Page     I have received my discharge instructions, and my questions have been answered. I have discussed any challenges I see with this plan with the nurse or doctor.    ..........................................................................................................................................  Patient/Patient Representative Signature      ..........................................................................................................................................  Patient Representative Print Name and Relationship to Patient    ..................................................               ................................................  Date                                            Time    ..........................................................................................................................................  Reviewed by Signature/Title    ...................................................              ..............................................  Date                                                            Time

## 2018-07-17 NOTE — IP AVS SNAPSHOT
MRN:[id number]                      After Visit Summary   7/17/2018    Sigrid Syed [place]    MRN: [id number]           Thank you!     Thank you for choosing [facility] for your care. Our goal is always to provide you with excellent care. Hearing back from our patients is one way we can continue to improve our services. Please take a few minutes to complete the written survey that you may receive in the mail after you visit with us. Thank you!        Patient Information     Date Of Birth          1985        Designated Caregiver       Most Recent Value    Caregiver    Will someone help with your care after discharge? no      About your hospital stay     You were admitted on:  July 17, 2018 You last received care in the:  [address]    You were discharged on:  July 20, 2018        Reason for your hospital stay       Maternity care                  Who to Call     For medical emergencies, please call 911.  For non-urgent questions about your medical care, please call your primary care provider or clinic, None          Attending Provider     Provider Specialty    Solitario Daniels MD OB/Gyn    Estefani Robles MD OB/Gyn       Primary Care Provider Fax #    Physician No Ref-Primary [phone]      After Care Instructions     Activity       Review discharge instructions            Diet       Resume previous diet            Discharge Instructions - Postpartum visit       Schedule postpartum visit with your provider and return to clinic in 6 weeks.                  Further instructions from your care team       Postpartum Vaginal Delivery Instructions    Activity       Ask family and friends for help when you need it.    Do not place anything in your vagina for 6 weeks.    You are not restricted on other activities, but take it easy for a few weeks to allow your body to recover from delivery.  You are able to do any activities you feel up to that point.    No driving until  you have stopped taking your pain medications (usually two weeks after delivery).     Call your health care provider if you have any of these symptoms:       Increased pain, swelling, redness, or fluid around your stiches from an episiotomy or perineal tear.    A fever above 100.4 F (38 C) with or without chills when placing a thermometer under your tongue.    You soak a sanitary pad with blood within 1 hour, or you see blood clots larger than a golf ball.    Bleeding that lasts more than 6 weeks.    Vaginal discharge that smells bad.    Severe pain, cramping or tenderness in your lower belly area.    A need to urinate more frequently (use the toilet more often), more urgently (use the toilet very quickly), or it burns when you urinate.    Nausea and vomiting.    Redness, swelling or pain around a vein in your leg.    Problems breastfeeding or a red or painful area on your breast.    Chest pain and cough or are gasping for air.    Problems coping with sadness, anxiety, or depression.  If you have any concerns about hurting yourself or the baby, call your provider immediately.     You have questions or concerns after you return home.     Keep your hands clean:  Always wash your hands before touching your perineal area and stitches.  This helps reduce your risk of infection.  If your hands aren't dirty, you may use an alcohol hand-rub to clean your hands. Keep your nails clean and short.        Pending Results     Date and Time Order Name Status Description    7/20/2018 0600 Rh Immune Globulin Study In process             Statement of Approval     Ordered          07/20/18 0815  I have reviewed and agree with all the recommendations and orders detailed in this document.  EFFECTIVE NOW     Approved and electronically signed by:  Estefani Robles MD             Admission Information     Date & Time Provider Department Dept. Phone    7/17/2018 Estefani Robles MD [address] [phone]     "  Your Vitals Were     Blood Pressure Pulse Temperature Respirations Height Weight    102/69 (BP Location: Right arm) 74 97.8  F (36.6  C) (Oral) 16 1.626 m (5' 4\") 93.9 kg (207 lb)    Last Period Pulse Oximetry BMI (Body Mass Index)             10/09/2017 94% 35.53 kg/m2         [facility] Information     [facility] gives you secure access to your electronic health record. If you see a primary care provider, you can also send messages to your care team and make appointments. If you have questions, please call your primary care clinic.  If you do not have a primary care provider, please call [phone] and they will assist you.        Care EveryWhere ID     This is your Care EveryWhere ID. This could be used by other organizations to access your [place] medical records  [id number]        Equal Access to Services     MAGAN PEOPLES : Marianne Owens, hoda rangel, whitney hoyos. So [facility] [phone].    ATENCIÓN: Si habla español, tiene a celis disposición servicios gratuitos de asistencia lingüística. Blas al [phone].    We comply with applicable federal civil rights laws and [place] laws. We do not discriminate on the basis of race, color, national origin, age, disability, sex, sexual orientation, or gender identity.               Review of your medicines      START taking        Dose / Directions    senna-docusate 8.6-50 MG per tablet   Commonly known as:  SENOKOT-S;PERICOLACE        Dose:  2 tablet   Take 2 tablets by mouth daily   Quantity:  60 tablet   Refills:  0         CONTINUE these medicines which have NOT CHANGED        Dose / Directions    ASPIRIN 81 PO        Dose:  1 tablet   Take 1 tablet by mouth daily   Refills:  0       folic acid 1 MG tablet   Commonly known as:  FOLVITE   Used for:  Blood clotting factor deficiency disorder (H)        TAKE 4 TABLET(4,000 MCG) BY MOUTH DAILY.   Quantity:  100 tablet   Refills:  3       " levothyroxine 88 MCG tablet   Commonly known as:  SYNTHROID/LEVOTHROID   Used for:  Hypothyroid in pregnancy, antepartum        Dose:  75 mcg   Take 1 tablet (88 mcg) by mouth daily   Quantity:  90 tablet   Refills:  0         STOP taking     valACYclovir 500 MG tablet   Commonly known as:  VALTREX                Where to get your medicines      Some of these will need a paper prescription and others can be bought over the counter. Ask your nurse if you have questions.     Bring a paper prescription for each of these medications     senna-docusate 8.6-50 MG per tablet                Protect others around you: Learn how to safely use, store and throw away your medicines at www.disposemymeds.org.             Medication List: This is a list of all your medications and when to take them. Check marks below indicate your daily home schedule. Keep this list as a reference.      Medications           Morning Afternoon Evening Bedtime As Needed    ASPIRIN 81 PO   Take 1 tablet by mouth daily   Last time this was given:  81 mg on 7/20/2018 10:03 AM                                   folic acid 1 MG tablet   Commonly known as:  FOLVITE   TAKE 4 TABLET(4,000 MCG) BY MOUTH DAILY.                                   levothyroxine 88 MCG tablet   Commonly known as:  SYNTHROID/LEVOTHROID   Take 1 tablet (88 mcg) by mouth daily   Last time this was given:  75 mcg on 7/20/2018 10:05 AM                                   senna-docusate 8.6-50 MG per tablet   Commonly known as:  SENOKOT-S;PERICOLACE   Take 2 tablets by mouth daily   Last time this was given:  1 tablet on 7/20/2018 10:02 AM

## 2018-07-18 ENCOUNTER — ANESTHESIA EVENT (OUTPATIENT)
Dept: OBGYN | Facility: CLINIC | Age: 33
End: 2018-07-18
Payer: COMMERCIAL

## 2018-07-18 ENCOUNTER — ANESTHESIA (OUTPATIENT)
Dept: OBGYN | Facility: CLINIC | Age: 33
End: 2018-07-18
Payer: COMMERCIAL

## 2018-07-18 LAB
ABO + RH BLD: ABNORMAL
ABO + RH BLD: ABNORMAL
BASOPHILS # BLD AUTO: 0 10E9/L (ref 0–0.2)
BASOPHILS NFR BLD AUTO: 0.1 %
BLD GP AB INVEST PLASRBC-IMP: ABNORMAL
BLD GP AB SCN SERPL QL: ABNORMAL
BLOOD BANK CMNT PATIENT-IMP: ABNORMAL
DIFFERENTIAL METHOD BLD: NORMAL
EOSINOPHIL # BLD AUTO: 0.1 10E9/L (ref 0–0.7)
EOSINOPHIL NFR BLD AUTO: 0.7 %
ERYTHROCYTE [DISTWIDTH] IN BLOOD BY AUTOMATED COUNT: 14.7 % (ref 10–15)
HCT VFR BLD AUTO: 37.7 % (ref 35–47)
HGB BLD-MCNC: 12.9 G/DL (ref 11.7–15.7)
IMM GRANULOCYTES # BLD: 0 10E9/L (ref 0–0.4)
IMM GRANULOCYTES NFR BLD: 0.3 %
LYMPHOCYTES # BLD AUTO: 1.5 10E9/L (ref 0.8–5.3)
LYMPHOCYTES NFR BLD AUTO: 16.3 %
MCH RBC QN AUTO: 31.3 PG (ref 26.5–33)
MCHC RBC AUTO-ENTMCNC: 34.2 G/DL (ref 31.5–36.5)
MCV RBC AUTO: 92 FL (ref 78–100)
MONOCYTES # BLD AUTO: 0.6 10E9/L (ref 0–1.3)
MONOCYTES NFR BLD AUTO: 6.3 %
NEUTROPHILS # BLD AUTO: 7 10E9/L (ref 1.6–8.3)
NEUTROPHILS NFR BLD AUTO: 76.3 %
NRBC # BLD AUTO: 0 10*3/UL
NRBC BLD AUTO-RTO: 0 /100
PLATELET # BLD AUTO: 214 10E9/L (ref 150–450)
RBC # BLD AUTO: 4.12 10E12/L (ref 3.8–5.2)
SPECIMEN EXP DATE BLD: ABNORMAL
WBC # BLD AUTO: 9.1 10E9/L (ref 4–11)

## 2018-07-18 PROCEDURE — 25000128 H RX IP 250 OP 636: Performed by: OBSTETRICS & GYNECOLOGY

## 2018-07-18 PROCEDURE — 25000125 ZZHC RX 250: Performed by: OBSTETRICS & GYNECOLOGY

## 2018-07-18 PROCEDURE — 0UC97ZZ EXTIRPATION OF MATTER FROM UTERUS, VIA NATURAL OR ARTIFICIAL OPENING: ICD-10-PCS | Performed by: OBSTETRICS & GYNECOLOGY

## 2018-07-18 PROCEDURE — 00HU33Z INSERTION OF INFUSION DEVICE INTO SPINAL CANAL, PERCUTANEOUS APPROACH: ICD-10-PCS | Performed by: ANESTHESIOLOGY

## 2018-07-18 PROCEDURE — 37000011 ZZH ANESTHESIA WARD SERVICE

## 2018-07-18 PROCEDURE — 59400 OBSTETRICAL CARE: CPT | Performed by: OBSTETRICS & GYNECOLOGY

## 2018-07-18 PROCEDURE — 25000132 ZZH RX MED GY IP 250 OP 250 PS 637: Performed by: OBSTETRICS & GYNECOLOGY

## 2018-07-18 PROCEDURE — 25000128 H RX IP 250 OP 636: Performed by: ANESTHESIOLOGY

## 2018-07-18 PROCEDURE — 27110038 ZZH RX 271: Performed by: ANESTHESIOLOGY

## 2018-07-18 PROCEDURE — 0KQM0ZZ REPAIR PERINEUM MUSCLE, OPEN APPROACH: ICD-10-PCS | Performed by: OBSTETRICS & GYNECOLOGY

## 2018-07-18 PROCEDURE — 3E0R3BZ INTRODUCTION OF ANESTHETIC AGENT INTO SPINAL CANAL, PERCUTANEOUS APPROACH: ICD-10-PCS | Performed by: ANESTHESIOLOGY

## 2018-07-18 PROCEDURE — 72200001 ZZH LABOR CARE VAGINAL DELIVERY SINGLE

## 2018-07-18 PROCEDURE — 12000029 ZZH R&B OB INTERMEDIATE

## 2018-07-18 RX ORDER — VALACYCLOVIR HYDROCHLORIDE 500 MG/1
500 TABLET, FILM COATED ORAL ONCE
Status: DISCONTINUED | OUTPATIENT
Start: 2018-07-18 | End: 2018-07-19

## 2018-07-18 RX ORDER — ACETAMINOPHEN 325 MG/1
TABLET ORAL
Status: DISCONTINUED
Start: 2018-07-18 | End: 2018-07-19 | Stop reason: HOSPADM

## 2018-07-18 RX ORDER — OXYTOCIN/0.9 % SODIUM CHLORIDE 30/500 ML
1-24 PLASTIC BAG, INJECTION (ML) INTRAVENOUS CONTINUOUS
Status: DISCONTINUED | OUTPATIENT
Start: 2018-07-18 | End: 2018-07-19

## 2018-07-18 RX ORDER — EPHEDRINE SULFATE 50 MG/ML
5 INJECTION, SOLUTION INTRAMUSCULAR; INTRAVENOUS; SUBCUTANEOUS
Status: DISCONTINUED | OUTPATIENT
Start: 2018-07-18 | End: 2018-07-19

## 2018-07-18 RX ORDER — NALBUPHINE HYDROCHLORIDE 10 MG/ML
2.5-5 INJECTION, SOLUTION INTRAMUSCULAR; INTRAVENOUS; SUBCUTANEOUS EVERY 6 HOURS PRN
Status: DISCONTINUED | OUTPATIENT
Start: 2018-07-18 | End: 2018-07-19

## 2018-07-18 RX ORDER — ROPIVACAINE HYDROCHLORIDE 2 MG/ML
10 INJECTION, SOLUTION EPIDURAL; INFILTRATION; PERINEURAL ONCE
Status: COMPLETED | OUTPATIENT
Start: 2018-07-18 | End: 2018-07-18

## 2018-07-18 RX ORDER — AMPICILLIN 2 G/1
2 INJECTION, POWDER, FOR SOLUTION INTRAVENOUS EVERY 6 HOURS
Status: DISCONTINUED | OUTPATIENT
Start: 2018-07-18 | End: 2018-07-19

## 2018-07-18 RX ORDER — NALOXONE HYDROCHLORIDE 0.4 MG/ML
.1-.4 INJECTION, SOLUTION INTRAMUSCULAR; INTRAVENOUS; SUBCUTANEOUS
Status: DISCONTINUED | OUTPATIENT
Start: 2018-07-18 | End: 2018-07-19

## 2018-07-18 RX ORDER — ACETAMINOPHEN 325 MG/1
975 TABLET ORAL EVERY 6 HOURS
Status: DISCONTINUED | OUTPATIENT
Start: 2018-07-18 | End: 2018-07-19

## 2018-07-18 RX ORDER — LEVOTHYROXINE SODIUM 88 UG/1
88 TABLET ORAL
Status: DISCONTINUED | OUTPATIENT
Start: 2018-07-18 | End: 2018-07-19

## 2018-07-18 RX ORDER — GENTAMICIN SULFATE 100 MG/100ML
1.5 INJECTION, SOLUTION INTRAVENOUS EVERY 8 HOURS
Status: DISCONTINUED | OUTPATIENT
Start: 2018-07-19 | End: 2018-07-19

## 2018-07-18 RX ADMIN — OXYTOCIN-SODIUM CHLORIDE 0.9% IV SOLN 30 UNIT/500ML 340 ML/HR: 30-0.9/5 SOLUTION at 22:38

## 2018-07-18 RX ADMIN — AMPICILLIN SODIUM 2 G: 2 INJECTION, POWDER, FOR SOLUTION INTRAMUSCULAR; INTRAVENOUS at 22:36

## 2018-07-18 RX ADMIN — SODIUM CHLORIDE, POTASSIUM CHLORIDE, SODIUM LACTATE AND CALCIUM CHLORIDE: 600; 310; 30; 20 INJECTION, SOLUTION INTRAVENOUS at 23:07

## 2018-07-18 RX ADMIN — ACETAMINOPHEN 975 MG: 325 TABLET, FILM COATED ORAL at 21:34

## 2018-07-18 RX ADMIN — OXYTOCIN-SODIUM CHLORIDE 0.9% IV SOLN 30 UNIT/500ML 2 MILLI-UNITS/MIN: 30-0.9/5 SOLUTION at 17:27

## 2018-07-18 RX ADMIN — OXYTOCIN-SODIUM CHLORIDE 0.9% IV SOLN 30 UNIT/500ML 2 MILLI-UNITS/MIN: 30-0.9/5 SOLUTION at 06:22

## 2018-07-18 RX ADMIN — SODIUM CHLORIDE, POTASSIUM CHLORIDE, SODIUM LACTATE AND CALCIUM CHLORIDE: 600; 310; 30; 20 INJECTION, SOLUTION INTRAVENOUS at 06:22

## 2018-07-18 RX ADMIN — SODIUM CHLORIDE, POTASSIUM CHLORIDE, SODIUM LACTATE AND CALCIUM CHLORIDE: 600; 310; 30; 20 INJECTION, SOLUTION INTRAVENOUS at 14:23

## 2018-07-18 RX ADMIN — GENTAMICIN SULFATE 100 MG: 100 INJECTION, SOLUTION INTRAVENOUS at 21:59

## 2018-07-18 RX ADMIN — LEVOTHYROXINE SODIUM 88 MCG: 88 TABLET ORAL at 08:41

## 2018-07-18 RX ADMIN — Medication 12 ML/HR: at 14:23

## 2018-07-18 RX ADMIN — ROPIVACAINE HYDROCHLORIDE 10 ML: 2 INJECTION, SOLUTION EPIDURAL; INFILTRATION at 14:07

## 2018-07-18 RX ADMIN — SODIUM CHLORIDE, POTASSIUM CHLORIDE, SODIUM LACTATE AND CALCIUM CHLORIDE 1000 ML: 600; 310; 30; 20 INJECTION, SOLUTION INTRAVENOUS at 13:30

## 2018-07-18 ASSESSMENT — ACTIVITIES OF DAILY LIVING (ADL)
SWALLOWING: 0-->SWALLOWS FOODS/LIQUIDS WITHOUT DIFFICULTY
COGNITION: 0 - NO COGNITION ISSUES REPORTED
TRANSFERRING: 0-->INDEPENDENT
FALL_HISTORY_WITHIN_LAST_SIX_MONTHS: NO
RETIRED_COMMUNICATION: 0-->UNDERSTANDS/COMMUNICATES WITHOUT DIFFICULTY
AMBULATION: 0-->INDEPENDENT
DRESS: 0-->INDEPENDENT
TOILETING: 0-->INDEPENDENT
RETIRED_EATING: 0-->INDEPENDENT
BATHING: 0-->INDEPENDENT

## 2018-07-18 ASSESSMENT — ENCOUNTER SYMPTOMS
SEIZURES: 0
DYSRHYTHMIAS: 0

## 2018-07-18 NOTE — PLAN OF CARE
38+6 wks gestation presenting to MAC for r/o rupture. Verbal consent obtained to apply EFM monitors, oriented to room, call light within reach. Patient states she felt some irregular UCs around , followed by a gush of clear fluid @. Medical history and prenatal record reviewed with patient in admission database. VSS. SVE /-3, Dorsey 3. FHTs Cat I +accels, no UCs.   2314: Dr Daniels called and updated on patient arrival. Telephone orders received to admit to labor, may have pain medications including epidural with jackson catheter. Plan per MD is allow patient to continue with spontaneous labor till AM, will order Pitocin in AM if no cervical change made NOC.   2335: Plan of care reviewed with patient, all questions answered, patient verbalized agreement.  0000: patient ambulated to labor room with support person. Report given to Milvia LECHUGA RN to assume cares.

## 2018-07-18 NOTE — PROVIDER NOTIFICATION
Dr. Daniels notified of patient labor status.  Plan to start oxytocin.  Order received.  Will continue to monitor and update.

## 2018-07-18 NOTE — H&P
No significant change in general health status based on examination of the patient, review of Nursing Admission Database and prenatal record. Patient with SROM at 2145 last night that was a slow leak and then had a large gush upon arrival at the hospital. Feeling some very mild cramps since but no ctx. On floridalma is having ctx every 2-3 min. cvx was unchanged from admit to 0620. Pitocin started at 0630 and is currently at 4mu/min. Continue to titrate and epidural when requested.    EFW: 6lb 8oz-7#

## 2018-07-18 NOTE — TELEPHONE ENCOUNTER
"36wk 6d, , OB [facility] Dr Robles. Stood up a few min ago and had a gush of fluid from vagina. Fluid is clear, a tiny bit of pink but no other bleeding. No contractions yet at this time. Baby moving well and like usual. Pt feeling well. Advised go to L&D now per guideline and per [facility] OB protocol. Pt states they will leave for [facility] L&D within the hour. Advised pt call back if any new sx. Pt voiced understanding and agreement. Has a .   Called and spoke w/ nurse at [facility] L&D and informed pt will be coming in.  Brooklyn España RN/FNA    Reason for Disposition    Leakage of fluid from vagina    Additional Information    Negative: [1] SEVERE abdominal pain (e.g., excruciating) AND [2] constant AND [3] present > 1 hour    Negative: Severe bleeding (e.g., continuous red blood from vagina, or large blood clots)    Negative: Umbilical cord hanging out of the vagina (shiny, white, curled appearance, \"like telephone cord\")    Negative: Uncontrollable urge to push (i.e., feels like baby is coming out now)    Negative: Can see baby    Negative: Sounds like a life-threatening emergency to the triager    Negative: < 20 weeks pregnant    Negative: Vaginal bleeding    Protocols used: PREGNANCY - RUPTURE OF MEMBRANES-ADULT-    "

## 2018-07-18 NOTE — ANESTHESIA PROCEDURE NOTES
Peripheral nerve/Neuraxial procedure note : epidural catheter  Pre-Procedure  Performed by MARYAM CLEMENTS  Location: OB      Pre-Anesthestic Checklist: patient identified, IV checked, risks and benefits discussed, informed consent, monitors and equipment checked, pre-op evaluation and at physician/surgeon's request    Timeout  Correct Patient: Yes   Correct Procedure: Yes   Correct Site: Yes   Correct Laterality: N/A   Correct Position: Yes   Site Marked: N/A   .   Procedure Documentation    .    Procedure:    Epidural catheter.  Insertion Site:L3-4  (midline approach) Injection technique: LORT saline   Local skin infiltrated with 3 mL of 1% lidocaine.  DANYELLE at 5 cm     Patient Prep;mask, sterile gloves, povidone-iodine 7.5% surgical scrub, patient draped.  .  Needle: [employer] needle Needle Gauge: 17.    Needle Length (Inches) 3.5  # of attempts: 2 and # of redirects: : 0. .   Catheter: 19 G . .  Catheter threaded easily  3 cm epidural space.  8 cm at skin.   .    Assessment/Narrative  Paresthesias: No.  .  .  Aspiration negative for heme or CSF  . Test dose of 3 mL lidocaine 1.5% w/ 1:200,000 epinephrine at 14:04.  Test dose negative for signs of intravascular, subdural or intrathecal injection. Comments:  Pt tolerated well.   Taped sterile and secure.   FHTs stable post-procedure.   No complications.

## 2018-07-18 NOTE — ANESTHESIA PREPROCEDURE EVALUATION
Procedure: TYREE  Preop diagnosis: IUP/Labor     No Known Allergies  Past Medical History:   Diagnosis Date     Cervical dysplasia 2010    pt had LSILpap 12/10. colpo with 8 oclock bx showed just inflammation. another LSIL 3/11 with 12 oclock bx showed...     Herpes genitalis      HSV-1 infection      MTHFR mutation (H) 2/5/2018     Prothrombin gene mutation (H) 1/8/2018    heterozygous     Thyroid disease     hypothyroidism     Past Surgical History:   Procedure Laterality Date     CYSTOSCOPY  8/05    cystoscopy, normal     Social History   Substance Use Topics     Smoking status: Never Smoker     Smokeless tobacco: Never Used     Alcohol use No      Comment: none since knew pg     Prior to Admission medications    Medication Sig Start Date End Date Taking? Authorizing Provider   ASPIRIN 81 PO Take 1 tablet by mouth daily   Yes Reported, Patient   folic acid (FOLVITE) 1 MG tablet TAKE 4 TABLET(4,000 MCG) BY MOUTH DAILY. 4/27/18  Yes Estefani Robles MD   levothyroxine (SYNTHROID/LEVOTHROID) 88 MCG tablet Take 1 tablet (88 mcg) by mouth daily 7/1/18  Yes Estefani Robles MD   valACYclovir (VALTREX) 500 MG tablet Take 1 tablet (500 mg) by mouth daily 6/15/18  Yes Estefani Robles MD     Current Facility-Administered Medications Ordered in Epic   Medication Dose Route Frequency Last Rate Last Dose     acetaminophen (TYLENOL) tablet 650 mg  650 mg Oral Q4H PRN         carboprost (HEMABATE) injection 250 mcg  250 mcg Intramuscular Once PRN         ePHEDrine injection 5 mg  5 mg Intravenous Q3 Min PRN         fentaNYL (PF) (SUBLIMAZE) injection  mcg   mcg Intravenous Q1H PRN         fentaNYL (SUBLIMAZE) 2 mcg/mL, ropivacaine (NAROPIN) 0.2% in NaCl 0.9% EPIDURAL infusion   EPIDURAL Continuous 12 mL/hr at 07/18/18 1423 12 mL/hr at 07/18/18 1423     ibuprofen (ADVIL/MOTRIN) tablet 800 mg  800 mg Oral Once PRN         lactated ringers BOLUS 250 mL  250 mL Intravenous Once PRN         lactated ringers BOLUS 500 mL  500  "mL Intravenous Once PRN         lactated ringers infusion   Intravenous Continuous 125 mL/hr at 07/18/18 1423       levothyroxine (SYNTHROID/LEVOTHROID) tablet 88 mcg  88 mcg Oral QAM AC   88 mcg at 07/18/18 0841     lidocaine (LMX4) cream   Topical Q1H PRN         lidocaine 1 % 0.1-20 mL  0.1-20 mL Subcutaneous Once PRN         lidocaine 1 % 1 mL  1 mL Other Q1H PRN         medication instruction   Does not apply Continuous PRN         medication instruction   Does not apply Continuous PRN         Medication Instructions: misoprostol (CYTOTEC)- Nurse to discuss ordering with provider, if needed. Ordered via \"OB misoprostol (CYTOTEC) Postpartum Hemorrhage PANEL\"   Does not apply Continuous PRN         methylergonovine (METHERGINE) injection 200 mcg  200 mcg Intramuscular Once PRN         nalbuphine (NUBAIN) injection 2.5-5 mg  2.5-5 mg Intravenous Q6H PRN         naloxone (NARCAN) injection 0.1-0.4 mg  0.1-0.4 mg Intravenous Q2 Min PRN         naloxone (NARCAN) injection 0.1-0.4 mg  0.1-0.4 mg Intravenous Q2 Min PRN         nitrous oxide/oxygen 50/50 blend   Inhalation Continuous PRN         ondansetron (ZOFRAN) injection 4 mg  4 mg Intravenous Q6H PRN         Opioid plan postpartum - medication instruction   Does not apply Continuous PRN         oxyCODONE-acetaminophen (PERCOCET) 5-325 MG per tablet 1 tablet  1 tablet Oral Once PRN         oxytocin (PITOCIN) 30 units in 500 mL 0.9% NaCl infusion  1-24 ej-units/min Intravenous Continuous 24 mL/hr at 07/18/18 1530 24 ej-units/min at 07/18/18 1530     oxytocin (PITOCIN) 30 units in 500 mL 0.9% NaCl infusion  100-340 mL/hr Intravenous Continuous PRN         oxytocin (PITOCIN) injection 10 Units  10 Units Intramuscular Once PRN         ropivacaine (NAROPIN) injection 10 mL  10 mL EPIDURAL Once         sodium chloride (PF) 0.9% PF flush 3 mL  3 mL Intracatheter Q8H         sodium chloride (PF) 0.9% PF flush 3 mL  3 mL Intracatheter Q1H PRN         sodium chloride " (PF) 0.9% PF flush 3 mL  3 mL Intracatheter Q8H         valACYclovir (VALTREX) tablet 500 mg  500 mg Oral Once         No current [facility]-ordered outpatient prescriptions on file.       fentaNYL-ropivacaine 12 mL/hr (07/18/18 1423)     lactated ringers 125 mL/hr at 07/18/18 1423     - MEDICATION INSTRUCTIONS -       - MEDICATION INSTRUCTIONS -       - MEDICATION INSTRUCTIONS -       nitrous oxide/oxygen 50/50 blend       - MEDICATION INSTRUCTIONS -       oxytocin in 0.9% NaCl 24 ej-units/min (07/18/18 1530)     oxytocin in 0.9% NaCl       Wt Readings from Last 1 Encounters:   07/17/18 93.9 kg (207 lb)     Temp Readings from Last 1 Encounters:   07/18/18 36.6  C (97.9  F) (Temporal)     BP Readings from Last 6 Encounters:   07/18/18 100/52   07/13/18 110/50   06/29/18 114/74   06/15/18 110/76   05/30/18 118/68   05/14/18 106/62     Pulse Readings from Last 4 Encounters:   12/14/17 93   10/04/17 101   01/23/15 64   04/04/15 80     Resp Readings from Last 1 Encounters:   07/18/18 16     SpO2 Readings from Last 1 Encounters:   10/04/17 99%     Recent Labs   Lab Test  06/15/18   1042  09/12/16   1020   NA  139  139   POTASSIUM  4.2  4.4   CHLORIDE  109  105   CO2  22  25   ANIONGAP  8  9   GLC  79  84   BUN  8  9   CR  0.64  0.89   ILIANA  9.3  9.3     Recent Labs   Lab Test  06/15/18   1042  09/12/16   1020   AST  20  21   ALT  21  33   ALKPHOS  182*  66   BILITOTAL  0.6  0.7     Recent Labs   Lab Test  07/17/18   2350  06/15/18   1042   WBC  9.1  10.0   HGB  12.9  13.4   PLT  214  271     Recent Labs   Lab Test  07/17/18   2350   ABO  A   RH  Neg     Recent Results (from the past 744 hour(s))   US OB Single Follow Up Repeat    Narrative    US OB Single Follow Up Repeat    Order #: [id number] Accession #: [id number]         Study Notes        Le Vigil on 6/29/2018  8:14 AM     Obstetrical Ultrasound Report  OB U/S - 2nd/3rd Trimester - Transabdominal    [facility]  Referring physician: Dr. Jara  Margaret  Sonographer: Le Vigil  Indication:  F/U Growth     Dating (mm/dd/yyyy):   LMP: Patient's last menstrual period was 10/09/2017.                           EDC:  Estimated Date of Delivery: Jul 25, 2018   GA by LMP:                 36w2d  Current Scan On (mm/dd/yyyy):  6/29/2018                                           EDC:   07/27/18                         GA by Current Scan:                36w0d  The calculation of the gestational age by current scan was based on BPD,   HC, AC and FL.     Anatomy Scan:  Dillon gestation.  Biometry:  BPD 9.05 cm 36w5d 71.1%   HC 32.16 cm 36w2d 21.4%   AC 30.66 cm 34w4d 16.2%   FL 7.10 cm 36w3d 49%   EFW (lbs/oz) 5 lbs                    15ozs       EFW (g) 2690 g 32.6%        Fetal heart rate: 139bpm  Fetal presentation: Cephalic  Amniotic fluid: 10.29cm  Placenta: anterior   Impression:                      EFW by today's ultrasound is 2690grams, which is the 33%tile.  Normal ELINA, vertex presentation.    Estefani Robles       Anesthesia Evaluation     .             ROS/MED HX    ENT/Pulmonary:      (-) asthma and sleep apnea   Neurologic:      (-) seizures and CVA   Cardiovascular:        (-) hypertension, syncope, arrhythmias and irregular heartbeat/palpitations   METS/Exercise Tolerance:     Hematologic: Comments: Denies EZ bleeding/bruising/blood thinner use    (+) Other Hematologic Disorder-      Musculoskeletal:         GI/Hepatic:     (+) GERD      (-) liver disease   Renal/Genitourinary:      (-) renal disease   Endo:     (+) thyroid problem hypothyroidism, .   (-) Type II DM   Psychiatric:         Infectious Disease:         Malignancy:         Other:                                    Anesthesia Plan      History & Physical Review      ASA Status:  2 .        Plan for Epidural          Postoperative Care      Consents  Anesthetic plan, risks, benefits and alternatives discussed with:  Patient and Spouse..

## 2018-07-19 PROBLEM — Z34.90 PREGNANCY: Status: ACTIVE | Noted: 2018-07-19

## 2018-07-19 LAB
BLOOD BANK CMNT PATIENT-IMP: NORMAL
T PALLIDUM AB SER QL: NONREACTIVE

## 2018-07-19 PROCEDURE — 12000037 ZZH R&B POSTPARTUM INTERMEDIATE

## 2018-07-19 PROCEDURE — 25000132 ZZH RX MED GY IP 250 OP 250 PS 637: Performed by: OBSTETRICS & GYNECOLOGY

## 2018-07-19 RX ORDER — OXYTOCIN/0.9 % SODIUM CHLORIDE 30/500 ML
100 PLASTIC BAG, INJECTION (ML) INTRAVENOUS CONTINUOUS
Status: DISCONTINUED | OUTPATIENT
Start: 2018-07-19 | End: 2018-07-20 | Stop reason: HOSPADM

## 2018-07-19 RX ORDER — MISOPROSTOL 200 UG/1
400 TABLET ORAL
Status: DISCONTINUED | OUTPATIENT
Start: 2018-07-19 | End: 2018-07-20 | Stop reason: HOSPADM

## 2018-07-19 RX ORDER — ASPIRIN 81 MG/1
81 TABLET, CHEWABLE ORAL DAILY
Status: DISCONTINUED | OUTPATIENT
Start: 2018-07-19 | End: 2018-07-20 | Stop reason: HOSPADM

## 2018-07-19 RX ORDER — IBUPROFEN 400 MG/1
800 TABLET, FILM COATED ORAL EVERY 6 HOURS PRN
Status: DISCONTINUED | OUTPATIENT
Start: 2018-07-19 | End: 2018-07-20 | Stop reason: HOSPADM

## 2018-07-19 RX ORDER — OXYTOCIN 10 [USP'U]/ML
10 INJECTION, SOLUTION INTRAMUSCULAR; INTRAVENOUS
Status: DISCONTINUED | OUTPATIENT
Start: 2018-07-19 | End: 2018-07-20 | Stop reason: HOSPADM

## 2018-07-19 RX ORDER — LANOLIN 100 %
OINTMENT (GRAM) TOPICAL
Status: DISCONTINUED | OUTPATIENT
Start: 2018-07-19 | End: 2018-07-20 | Stop reason: HOSPADM

## 2018-07-19 RX ORDER — BISACODYL 10 MG
10 SUPPOSITORY, RECTAL RECTAL DAILY PRN
Status: DISCONTINUED | OUTPATIENT
Start: 2018-07-20 | End: 2018-07-20 | Stop reason: HOSPADM

## 2018-07-19 RX ORDER — NALOXONE HYDROCHLORIDE 0.4 MG/ML
.1-.4 INJECTION, SOLUTION INTRAMUSCULAR; INTRAVENOUS; SUBCUTANEOUS
Status: DISCONTINUED | OUTPATIENT
Start: 2018-07-19 | End: 2018-07-20 | Stop reason: HOSPADM

## 2018-07-19 RX ORDER — AMOXICILLIN 250 MG
2 CAPSULE ORAL 2 TIMES DAILY
Status: DISCONTINUED | OUTPATIENT
Start: 2018-07-19 | End: 2018-07-20 | Stop reason: HOSPADM

## 2018-07-19 RX ORDER — AMOXICILLIN 250 MG
1 CAPSULE ORAL 2 TIMES DAILY
Status: DISCONTINUED | OUTPATIENT
Start: 2018-07-19 | End: 2018-07-20 | Stop reason: HOSPADM

## 2018-07-19 RX ORDER — LEVOTHYROXINE SODIUM 25 UG/1
75 TABLET ORAL DAILY
Status: DISCONTINUED | OUTPATIENT
Start: 2018-07-19 | End: 2018-07-20 | Stop reason: HOSPADM

## 2018-07-19 RX ORDER — OXYTOCIN/0.9 % SODIUM CHLORIDE 30/500 ML
340 PLASTIC BAG, INJECTION (ML) INTRAVENOUS CONTINUOUS PRN
Status: DISCONTINUED | OUTPATIENT
Start: 2018-07-19 | End: 2018-07-20 | Stop reason: HOSPADM

## 2018-07-19 RX ORDER — HYDROCORTISONE 2.5 %
CREAM (GRAM) TOPICAL 3 TIMES DAILY PRN
Status: DISCONTINUED | OUTPATIENT
Start: 2018-07-19 | End: 2018-07-20 | Stop reason: HOSPADM

## 2018-07-19 RX ORDER — ACETAMINOPHEN 325 MG/1
650 TABLET ORAL EVERY 4 HOURS PRN
Status: DISCONTINUED | OUTPATIENT
Start: 2018-07-19 | End: 2018-07-20 | Stop reason: HOSPADM

## 2018-07-19 RX ORDER — OXYCODONE HYDROCHLORIDE 5 MG/1
5 TABLET ORAL EVERY 4 HOURS PRN
Status: DISCONTINUED | OUTPATIENT
Start: 2018-07-19 | End: 2018-07-20 | Stop reason: HOSPADM

## 2018-07-19 RX ADMIN — ACETAMINOPHEN 650 MG: 325 TABLET, FILM COATED ORAL at 08:01

## 2018-07-19 RX ADMIN — IBUPROFEN 800 MG: 400 TABLET ORAL at 15:35

## 2018-07-19 RX ADMIN — IBUPROFEN 800 MG: 400 TABLET ORAL at 08:01

## 2018-07-19 RX ADMIN — ASPIRIN 81 MG 81 MG: 81 TABLET ORAL at 20:30

## 2018-07-19 RX ADMIN — ACETAMINOPHEN 650 MG: 325 TABLET, FILM COATED ORAL at 15:33

## 2018-07-19 RX ADMIN — ACETAMINOPHEN 650 MG: 325 TABLET, FILM COATED ORAL at 21:34

## 2018-07-19 RX ADMIN — SENNOSIDES AND DOCUSATE SODIUM 1 TABLET: 8.6; 5 TABLET ORAL at 08:01

## 2018-07-19 RX ADMIN — IBUPROFEN 800 MG: 400 TABLET ORAL at 00:18

## 2018-07-19 RX ADMIN — SENNOSIDES AND DOCUSATE SODIUM 1 TABLET: 8.6; 5 TABLET ORAL at 20:30

## 2018-07-19 RX ADMIN — LEVOTHYROXINE SODIUM 75 MCG: 25 TABLET ORAL at 09:45

## 2018-07-19 RX ADMIN — IBUPROFEN 800 MG: 400 TABLET ORAL at 21:34

## 2018-07-19 NOTE — L&D DELIVERY NOTE
Delivery Date:  2018      DATE OF SERVICE: 2018      HISTORY:  Sigrid is a 32-year-old  1, para 0, whose pregnancy was followed by myself.  The patient is blood type A negative and did receive RhoGAM at 28 weeks.  She is rubella immune.  She is group B strep negative.  The patient does have a history of genital herpes based purely on blood testing positive for HSV-2, but has never had a genital outbreak.  She was started on Valtrex at approximately 35-36 weeks' gestation and did not have any outbreaks during pregnancy.  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  She had consultation with the perinatologist and because she had never had a personal risk of VTE, they recommended baby aspirin throughout her pregnancy but no other interventions.  The patient also has hypothyroidism and was started on 50 mcg of levothyroxine at her first OB visit, was bumped to 75 mcg at 12 weeks, then 88 mcg at 36 weeks, and then remained at 88 mcg throughout the remainder of the pregnancy.      The patient then presented to Labor and Delivery on 2018, at which time she was 38 weeks 6 days with spontaneous rupture of membranes at 21:45.  She was having very irregular contractions that she was not feeling.  She was allowed to rest overnight to see if she went into active labor and when she did not, she was started on Pitocin at approximately 6 a.m.  The patient had very difficult to trace contractions on the Ayla. She was started on Pitocin and went to 24 milliunits per minute.  The patient was then found to be 3 cm, then 3.5 cm, and then received an epidural for pain control.  Despite feeling that the contractions were inadequate, she did get to 4-5 cm dilation, and at that point was 24 milliunits per minute on her Pitocin.  It was actually discontinued for approximately 30 minutes to allow her receptors to  desaturate and then was restarted again, and very shortly thereafter was found to be anterior lip.  When she was checked 2 hours later, she was completely dilated and +2 station.        Her active labor onset was at approximately 15:00.  She became complete at 20:30 and began pushing at 20:40. At approximately 21:00, the patient spiked a temperature to 101.5 and was tachycardic to the 105-110 range.  Fetal heart tones also had started to trend up and at that time were found to be 170s to 180s.  The patient was given 975 mg of Tylenol to help bring down her temperature and we continued to monitor while pushing.  She then had a subsequent temperature that was elevated to 103.  She was persistently tachycardic, as was the baby, and it was felt that she had met criteria for chorioamnionitis.  At that point, we gave her 1 dose of gentamicin.  The wait on the ampicillin was somewhat longer than anticipated and she got her ampicillin just before actual delivery.  The patient was having excellent pushing efforts.  The estimated fetal weight was thought to be 6-1/2 pounds and the baby was in an OA position at +3 station when the patient began to have feelings of near-syncope every time she pushed because her heart rate was getting into the 150s.  Because of the chorioamnionitis, the persistent fetal tachycardia and maternal tachycardia with some near-syncope, decision was made to expedite delivery with vacuum.  I discussed the risks and benefits of this with her and her partner and they consented to proceed.      We did a straight catheterization of the bladder for approximately 50 mL of clear, but somewhat dark yellow urine, and then the [employer] mushroom vacuum was applied to the fetal occiput.  With her next contraction, the vacuum was used for 1 pull without any pop-offs over the course of 2 times of pushing within that one contraction, and the head delivered easily and atraumatically.  At this point, the vacuum was  released and the remainder of the baby delivered easily and atraumatically onto the maternal chest and abdomen.  Umbilical cord clamping was delayed for 1 minute and the cord was doubly clamped and cut.  The infant delivered at 10:33 p.m.  Apgars were 9 and 9 at 1 and 5 minutes, respectively, and the weight is still pending at the time of this dictation.  The  nurse practitioner was present because of chorio and the vacuum, but the baby was stable and was allowed to lay skin to skin for approximately 20 minutes prior to the infant being taken to the NICU for antibiotics.        The placenta then delivered intact with a normal 3-vessel cord at 10:38 p.m.  The patient sustained a second-degree perineal laceration that was somewhat more deviated over to the left side.  This was repaired in the normal fashion with 3-0 Vicryl suture.  Her total estimated blood loss initially was 200 mL and her uterus was nice and firm with just normal running Pitocin.  Approximately 15-20 minutes after I walked out of the room, I was called back in by the nurse because she had passed a large blood clot.  That blood clot was weighed at 200 mL.  I then did a bimanual exploration and there was approximately 100 mL of additional clot noted in the uterus that was able to be manually removed. I then did some bimanual massage with excellent uterine tone and no further clot passage.  I then placed 800 mcg of Cytotec rectally just to ensure improved uterine tone.  Total estimated blood loss was therefore 500 mL. The mother was doing stable throughout this.  Her blood pressure remained stable and her tachycardia did not worsen.      First stage of labor 5 hours and 30 minutes, second stage 2 hours and 3 minutes, third stage 5 minutes for a total of 7 hours and 38 minutes.      DIAGNOSES:   1.  Intrauterine pregnancy at 38+6 with prolonged premature rupture of membranes.   2.  Pitocin augmentation of labor.   3.  Epidural for pain control.    4.  Chorioamnionitis.   5.  Vacuum-assisted vaginal delivery of a viable male infant.   6.  Second-degree perineal laceration, repaired in the normal fashion.     7.  Estimated blood loss of 500 mL or just over, qualifying for a postpartum hemorrhage, responding well to manual removal of blood clots, Pitocin and Cytotec.         SHEY JIMENEZ MD             D: 2018   T: 2018   MT:       Name:     MELVINJACKSON WILKINS   MRN:      [id number]        Account:        [id number]   :      1985        Delivery Date:  2018               Document: [id number]

## 2018-07-19 NOTE — PROVIDER NOTIFICATION
07/18/18 1830   Provider Notification   Provider Name/Title Dr. Robles   Method of Notification Phone   Request Evaluate - Remote   Notification Reason SVE   Updated Dr. Robles on SVE.  Margaret staying close to the hospital for delivery.

## 2018-07-19 NOTE — PLAN OF CARE
Problem: Patient Care Overview  Goal: Plan of Care/Patient Progress Review  Outcome: Improving  Pt. Feeding on demand via breast in NICU. VSS, Voiding well independently, up ad felicia. Reports pain well controlled with ordered meds. Education given on baby cares and self cares, mother able to perform most cares on her own. Denies dizziness or any other issues. Encouraged to call with concerns or questions. Will continue to monitor, no concerns at this time.

## 2018-07-19 NOTE — PROVIDER NOTIFICATION
07/19/18 1122   Provider Notification   Provider Name/Title Dr Gil   Method of Notification Phone   Request Evaluate-Remote   Notification Reason Medication Request   Dr Gil paged to request order for daily baby aspirin pt takes for prothrombin genetic history. Order given to initiate once daily PO 81 mg aspirin tonight.

## 2018-07-19 NOTE — PROGRESS NOTES
[facility]    Post-Partum Progress Note    Assessment & Plan   Assessment:  Post-partum day #1  Vacuum extraction  Chorioamnionitis    Doing well.  No excessive bleeding  Pain well-controlled.    Plan:  Ambulation encouraged  Lactation consultation  Pain control measures as needed  Reportable signs and symptoms dicussed with the patient  Anticipate discharge tomorrow    Jeny Gil     Interval History   Doing well.  Pain is well-controlled --bottom a bit sore with activity. Some cramping.  No fevers.  No history of foul-smelling vaginal discharge.  Good appetite --no n/v.  Denies chest pain, shortness of breath, nausea or vomiting.  Vaginal bleeding is similar to a heavy menstrual flow.  Ambulatory.  Breastfeeding fairly well --down to NICU twice overnight.  Baby doing well --getting abx in NICU    Medications     - MEDICATION INSTRUCTIONS -       - MEDICATION INSTRUCTIONS -         acetaminophen  975 mg Oral Q6H     levothyroxine  88 mcg Oral QAM AC     sodium chloride (PF)  3 mL Intracatheter Q8H     valACYclovir  500 mg Oral Once       Physical Exam   Temp: 103  F (39.4  C) (MD notified) Temp src: Axillary BP: 125/73     Resp: 20 SpO2: 94 %      Vitals:    07/17/18 2258   Weight: 93.9 kg (207 lb)     Vital Signs with Ranges  Temp:  [97.8  F (36.6  C)-103  F (39.4  C)] 103  F (39.4  C)  Resp:  [16-20] 20  BP: ()/(50-79) 125/73  SpO2:  [87 %-94 %] 94 %  I/O last 3 completed shifts:  In: -   Out: 1251 [Urine:1100; Blood:151]    Uterine fundus is firm, non-tender and at the level of the umbilicus  Extremities Non-tender    Data   Recent Labs   Lab Test  07/17/18   2350   ABO  A   RH  Neg   AS  Pos*     Recent Labs   Lab Test  07/17/18   2350  06/15/18   1042   HGB  12.9  13.4     Recent Labs   Lab Test  01/08/18   0935   RUQIGG  20

## 2018-07-19 NOTE — PLAN OF CARE
Problem: Patient Care Overview  Goal: Plan of Care/Patient Progress Review  Outcome: Improving  VSS. Pain controlled, using pillows to sit on wheelchair to NICU. Breastfeeding in NICU q3h, reports its going well. Pt requests to get one time dose of valtrex tonight, plans to discuss with OB plan whether to continue taking at home. Up ad felicia, voiding well. PIV removed. VB, fundus WDL. Will continue to monitor.

## 2018-07-19 NOTE — LACTATION NOTE
Initial Lactation visit. Sigrid states that there have been several successful feedings today. Assisted with feeding at time of visit; skin to skin encouraged to help baby arouse. Hand expression demonstrated and colostrum easily expressed. Infant latches on deeply to left breast with coordinated suckle. Sigrid is doing great and needs a lot of encouragement. Supportive  at bedside and asking appropriate questions. They took a breastfeeding course and are working well together.  Recommend unlimited, frequent breast feedings: At least 8 - 12 times every 24 hours. Avoid pacifiers and supplementation with formula unless medically indicated. Explained benefits of holding baby skin on skin to help promote better breastfeeding outcomes. Will revisit as needed.    Shaila Cruz, ABEL, IBCLC

## 2018-07-19 NOTE — PLAN OF CARE
Problem: Patient Care Overview  Goal: Plan of Care/Patient Progress Review  Outcome: Improving  VSS on RA. Up independently. Working on breastfeeding . Pain managed with tylenol and ibuprofen. Encouraged to call with any questions or concerns.

## 2018-07-19 NOTE — PROGRESS NOTES
Patient was found to be anterior lip around 1830. Rechecked at 2030 and was complete and +2. Had been pushing since 2040 and at 2100 had a temporal temp to 101.5. Patient's HR is 105-110 and then goes to 130-140s with pushing. FHTs are 170s-180s with good variability. +accels and occasional variables  Clear fluid w/o foul smell but other criteria met for chorio.  Tylenol 975mg given and amp/gent ordered. She is at 24 hours since SROM  Will allow her to push another 30 min with RN and reassess. May need to consider vacuum delivery to expedite given chorio.

## 2018-07-19 NOTE — PLAN OF CARE
1920 report received, to assume care at this time.  Pt states she is comfortable in frog position.  POC discussed with pt and SO.   2025 Dr Robles updated via phone on but not limited to FHT, ctxs, SVE.  POC to start pushing.  2108 Dr Robles updated via electronic page on maternal temp/FHT.  2110 Dr Robles called back, will come eval.  2130 Dr Robles at bedside. Reviewed FHT strip.   2150 Dr Robles onto unit.  2220 Dr Robles at bedside, discuss vac vs c/s.  Pt and SO agreeable to vac.  resusitation team assembled, straight cath performed by Dr Robles.  Beverly ortay vac applied at 2233 by Dr Robles  for one pull, no po. Total application time 20 secs.  0100 pt up to BR with SBA.    0110 to NICU bed 7.  0145 pt to room 436 via w/c.  Report to Teresa Tyler RN to assume cares at this time.

## 2018-07-19 NOTE — L&D DELIVERY NOTE
Vacuum delivery of viable male at 2233   Vacuum performed for chorio, fetal tachycardia, maternal exhaustion and near syncope with pushing   Vacuum on for 1 pulls/CTXs with 0 pop-offs.   Baby was in OA position, +3 station, EFW 6.5#   Alternative strategies were discussed.   Consent Obtained   Surgical and resuscitation teams were available.   Baby's weight unavailable     Apgars 9, 9   Clear AF  Second degree perineal lac more deviated to her left side repaired in normal fashion with 3-0 vicryl  Initially had minimal bleeding with EBL called 200cc. Then about 15 min later had delayed passed of large clots totally about 300cc. Had manual removal of clot from uterus and bimanual massage. 800mcg of cytotec placed vaginally and pitocin continues to run

## 2018-07-20 VITALS
RESPIRATION RATE: 16 BRPM | HEIGHT: 64 IN | WEIGHT: 207 LBS | DIASTOLIC BLOOD PRESSURE: 69 MMHG | SYSTOLIC BLOOD PRESSURE: 102 MMHG | BODY MASS INDEX: 35.34 KG/M2 | HEART RATE: 74 BPM | TEMPERATURE: 97.8 F | OXYGEN SATURATION: 94 %

## 2018-07-20 LAB
ABO + RH BLD: NORMAL
ABO + RH BLD: NORMAL
BLOOD BANK CMNT PATIENT-IMP: NORMAL
DATE RH IMM GL GVN: NORMAL
FETAL CELL SCN BLD QL ROSETTE: NORMAL
HGB BLD-MCNC: 11.4 G/DL (ref 11.7–15.7)
RH IG VIALS RECOM PATIENT: NORMAL

## 2018-07-20 PROCEDURE — 36415 COLL VENOUS BLD VENIPUNCTURE: CPT | Performed by: OBSTETRICS & GYNECOLOGY

## 2018-07-20 PROCEDURE — 25000132 ZZH RX MED GY IP 250 OP 250 PS 637: Performed by: OBSTETRICS & GYNECOLOGY

## 2018-07-20 PROCEDURE — 85018 HEMOGLOBIN: CPT | Performed by: OBSTETRICS & GYNECOLOGY

## 2018-07-20 PROCEDURE — 25000128 H RX IP 250 OP 636: Performed by: OBSTETRICS & GYNECOLOGY

## 2018-07-20 PROCEDURE — 86900 BLOOD TYPING SEROLOGIC ABO: CPT | Performed by: OBSTETRICS & GYNECOLOGY

## 2018-07-20 PROCEDURE — 85461 HEMOGLOBIN FETAL: CPT | Performed by: OBSTETRICS & GYNECOLOGY

## 2018-07-20 PROCEDURE — 86901 BLOOD TYPING SEROLOGIC RH(D): CPT | Performed by: OBSTETRICS & GYNECOLOGY

## 2018-07-20 RX ORDER — AMOXICILLIN 250 MG
2 CAPSULE ORAL DAILY
Qty: 60 TABLET | Refills: 0 | Status: SHIPPED | OUTPATIENT
Start: 2018-07-20 | End: 2018-10-01

## 2018-07-20 RX ADMIN — ACETAMINOPHEN 650 MG: 325 TABLET, FILM COATED ORAL at 14:35

## 2018-07-20 RX ADMIN — IBUPROFEN 800 MG: 400 TABLET ORAL at 10:02

## 2018-07-20 RX ADMIN — HUMAN RHO(D) IMMUNE GLOBULIN 300 MCG: 300 INJECTION, SOLUTION INTRAMUSCULAR at 13:57

## 2018-07-20 RX ADMIN — IBUPROFEN 800 MG: 400 TABLET ORAL at 03:43

## 2018-07-20 RX ADMIN — ACETAMINOPHEN 650 MG: 325 TABLET, FILM COATED ORAL at 10:02

## 2018-07-20 RX ADMIN — LEVOTHYROXINE SODIUM 75 MCG: 25 TABLET ORAL at 10:05

## 2018-07-20 RX ADMIN — ASPIRIN 81 MG 81 MG: 81 TABLET ORAL at 10:03

## 2018-07-20 RX ADMIN — ACETAMINOPHEN 650 MG: 325 TABLET, FILM COATED ORAL at 01:49

## 2018-07-20 RX ADMIN — SENNOSIDES AND DOCUSATE SODIUM 1 TABLET: 8.6; 5 TABLET ORAL at 10:02

## 2018-07-20 NOTE — ANESTHESIA POSTPROCEDURE EVALUATION
Patient: Sigrid Syed Johnathan    * No procedures listed *    Diagnosis:* No pre-op diagnosis entered *  Diagnosis Additional Information: No value filed.    Anesthesia Type:  No value filed.    Note:  Anesthesia Post Evaluation    Patient location during evaluation: Floor  Patient participation: Able to fully participate in evaluation  Level of consciousness: awake and alert  Pain management: adequate  Airway patency: patent  Cardiovascular status: acceptable  Respiratory status: acceptable  Hydration status: acceptable  PONV: none       Comments: Patient doing well. No headaches, low back pain or residual numbness/paresthesias. Eating and drinking without difficulty. All questions answered. No concerns from patient. No anesthetic complications.           Last vitals:  Vitals:    07/19/18 0100 07/19/18 0800 07/19/18 1559   BP: 125/73 133/84 120/70   Pulse:  87 83   Resp:   16   Temp:  36.7  C (98.1  F) 36.4  C (97.6  F)   SpO2:            Electronically Signed By: Christine Simmons MD  July 19, 2018  8:34 PM

## 2018-07-20 NOTE — PROGRESS NOTES
"S: Pt doing well. Infant is being . Pain is controlled with current analgesics.  Medication(s) being used: acetaminophen and ibuprofen (OTC). +BM. Baby is out of NICU since last night. Getting last dose of IV abx tonight and anticipating he will be allowed to discharge today    O: /75  Pulse 90  Temp 98.3  F (36.8  C) (Oral)  Resp 16  Ht 1.626 m (5' 4\")  Wt 93.9 kg (207 lb)  LMP 10/09/2017  SpO2 94%  Breastfeeding? Unknown  BMI 35.53 kg/m2        ABD:  Uterine fundus is firm, non-tender and at the level of the umbilicus                Hemoglobin   Date Value Ref Range Status   07/17/2018 12.9 11.7 - 15.7 g/dL Final            Lab Results   Component Value Date    RH Neg 07/17/2018       A:  Post-partum day #2 s/p Normal spontaneous vaginal delivery    P: Continue PP Cares  Discharge home today and follow up in 6 weeks    "

## 2018-07-20 NOTE — PLAN OF CARE
Problem: Patient Care Overview  Goal: Plan of Care/Patient Progress Review  Outcome: Adequate for Discharge Date Met: 07/20/18  D: VSS, assessments WDL.   I: Pt. received complete discharge paperwork and home medications as filled by discharge pharmacy.  Pt. was given times of last dose for all discharge medications in writing on discharge medication sheets.  Discharge teaching included home medication, pain management, activity restrictions, postpartum cares, and signs and symptoms of infection.    A: Discharge outcomes on care plan met.  Mother states understanding and comfort with self and baby cares.  P: Pt. discharged to room in with infant, pending infant's possible discharge later today. Home care referral sent.  Pt. to follow up with OB per MD order.  Pt. had no further questions at the time of discharge and no unmet needs were identified.

## 2018-07-20 NOTE — PLAN OF CARE
Problem: Patient Care Overview  Goal: Plan of Care/Patient Progress Review  Outcome: Improving  Assessment and vital signs within normal limits.  Patient is up independently, voiding without difficulty, pain well controlled with medications given as prescribed. Taking ibuprofen and tylenol.  Working on breastfeeding, and  cares. Encouraged to continue to ambulate as able and void frequently.  Continue to monitor and notify MD as needed.

## 2018-07-20 NOTE — LACTATION NOTE
Follow up visit. Sigrid is discharging home with her baby and  today. She states breastfeeding has been going well. Her baby was initially in the NICU for chorio and is now rooming in with her. Questions answered about pumping and bottle feeding and pacifier use. Recommended Sigrid use LC at [facility] peds as needed and continue marking feeds, voids and stools on feeding log once at home. Sigrid and her  appreciative of my visit.

## 2018-07-22 ENCOUNTER — NURSE TRIAGE (OUTPATIENT)
Dept: NURSING | Facility: CLINIC | Age: 33
End: 2018-07-22

## 2018-07-22 NOTE — TELEPHONE ENCOUNTER
Caller is  5 days postpartum; today has fever of 101.4;  Triage protocol reviewed; has  no focal symptoms but did receive IV antibiotics  after delivery for  prolonged rupture of membranes and fever after delivery;     On call provider Dr. Robles paged via @0504 to call patient at home @ [phone]   Ayanna Elizondo RN  FNA        Reason for Disposition    Fever > 100.4 F (38.0 C)    Additional Information    Negative: Difficult to awaken or acting confused  (e.g., disoriented, slurred speech)    Negative: Shock suspected (e.g., cold/pale/clammy skin, too weak to stand, low BP, rapid pulse)    Negative: [1] Difficulty breathing AND [2] bluish lips, tongue or face    Negative: [1] Rash with purple (or blood-colored) spots or dots AND [2] patient sounds sick or weak to the triager    Negative: Sounds like a life-threatening emergency to the triager    Negative: Abdominal pain    Negative: Breast pain    Negative: Pain with urination    Negative: Foul smelling vaginal discharge (i.e., lochia)    Negative: Other symptom is present, see that guideline  (e.g., colds, sore throat, earache, cough, diarrhea, vomiting)    Negative: Fever > 104 F (40 C)    Negative: [1] Headache AND [2] stiff neck (can't touch chin to chest)    Negative: Difficulty breathing    Negative: IV drug abuse    Negative: [1] Widespread rash AND [2] bright red, sunburn-like    Negative: [1] Drinking very little AND [2] dehydration suspected (e.g., no urine > 12 hours, very dry mouth, very lightheaded)    Negative: Patient sounds very sick or weak to the triager    Protocols used: POSTPARTUM - FEVER-ADULT-      
06-Dec-2017 19:19

## 2018-10-01 ENCOUNTER — PRENATAL OFFICE VISIT (OUTPATIENT)
Dept: OBGYN | Facility: CLINIC | Age: 33
End: 2018-10-01
Payer: COMMERCIAL

## 2018-10-01 VITALS
SYSTOLIC BLOOD PRESSURE: 104 MMHG | WEIGHT: 181 LBS | BODY MASS INDEX: 30.9 KG/M2 | DIASTOLIC BLOOD PRESSURE: 62 MMHG | HEART RATE: 66 BPM | HEIGHT: 64 IN

## 2018-10-01 DIAGNOSIS — O99.280 HYPOTHYROID IN PREGNANCY, ANTEPARTUM: ICD-10-CM

## 2018-10-01 DIAGNOSIS — E03.9 ACQUIRED HYPOTHYROIDISM: ICD-10-CM

## 2018-10-01 DIAGNOSIS — E03.9 HYPOTHYROID IN PREGNANCY, ANTEPARTUM: ICD-10-CM

## 2018-10-01 PROBLEM — Z34.90 PREGNANCY: Status: RESOLVED | Noted: 2018-07-19 | Resolved: 2018-10-01

## 2018-10-01 PROBLEM — O09.90 SUPERVISION OF HIGH-RISK PREGNANCY: Status: RESOLVED | Noted: 2018-01-08 | Resolved: 2018-10-01

## 2018-10-01 PROBLEM — Z37.9 NORMAL LABOR: Status: RESOLVED | Noted: 2018-07-17 | Resolved: 2018-10-01

## 2018-10-01 PROCEDURE — 84443 ASSAY THYROID STIM HORMONE: CPT | Performed by: OBSTETRICS & GYNECOLOGY

## 2018-10-01 PROCEDURE — 99207 ZZC POST PARTUM EXAM: CPT | Performed by: OBSTETRICS & GYNECOLOGY

## 2018-10-01 PROCEDURE — 36415 COLL VENOUS BLD VENIPUNCTURE: CPT | Performed by: OBSTETRICS & GYNECOLOGY

## 2018-10-01 ASSESSMENT — ANXIETY QUESTIONNAIRES
6. BECOMING EASILY ANNOYED OR IRRITABLE: SEVERAL DAYS
5. BEING SO RESTLESS THAT IT IS HARD TO SIT STILL: NOT AT ALL
1. FEELING NERVOUS, ANXIOUS, OR ON EDGE: SEVERAL DAYS
2. NOT BEING ABLE TO STOP OR CONTROL WORRYING: NOT AT ALL
3. WORRYING TOO MUCH ABOUT DIFFERENT THINGS: NOT AT ALL
7. FEELING AFRAID AS IF SOMETHING AWFUL MIGHT HAPPEN: NOT AT ALL
IF YOU CHECKED OFF ANY PROBLEMS ON THIS QUESTIONNAIRE, HOW DIFFICULT HAVE THESE PROBLEMS MADE IT FOR YOU TO DO YOUR WORK, TAKE CARE OF THINGS AT HOME, OR GET ALONG WITH OTHER PEOPLE: SOMEWHAT DIFFICULT
GAD7 TOTAL SCORE: 3

## 2018-10-01 ASSESSMENT — PATIENT HEALTH QUESTIONNAIRE - PHQ9: 5. POOR APPETITE OR OVEREATING: SEVERAL DAYS

## 2018-10-01 NOTE — MR AVS SNAPSHOT
After Visit Summary   10/1/2018    Sigrid Mann    MRN: [id number]           Patient Information     Date Of Birth          1985        Visit Information        Provider Department      10/1/2018 10:20 AM Estefani Robles MD [facility] [place]        Today's Diagnoses     Routine postpartum follow-up    -  1    Acquired hypothyroidism           Follow-ups after your visit        Your next 10 appointments already scheduled     Oct 29, 2018  8:40 AM CDT   Office Visit with Estefani Robles MD   [facility] [place] ([facility])    [address] [place] [id number]   [phone]           Bring a current list of meds and any records pertaining to this visit. For Physicals, please bring immunization records and any forms needing to be filled out. Please arrive 10 minutes early to complete paperwork.              Who to contact     If you have questions or need follow up information about today's clinic visit or your schedule please contact [facility] [place] directly at [phone].  Normal or non-critical lab and imaging results will be communicated to you by ozukehart, letter or phone within 4 business days after the clinic has received the results. If you do not hear from us within 7 days, please contact the clinic through [employer] or phone. If you have a critical or abnormal lab result, we will notify you by phone as soon as possible.  Submit refill requests through [employer] or call your pharmacy and they will forward the refill request to us. Please allow 3 business days for your refill to be completed.          Additional Information About Your Visit        ozukehart Information     [employer] gives you secure access to your electronic health record. If you see a primary care provider, you can also send messages to your care team and make appointments. If you have questions, please call your primary care clinic.  If you do  "not have a primary care provider, please call [phone] and they will assist you.        Care EveryWhere ID     This is your Care EveryWhere ID. This could be used by other organizations to access your [facility] medical records  [id number]        Your Vitals Were     Pulse Height Last Period BMI (Body Mass Index)          66 5' 4\" (1.626 m) 10/09/2017 31.07 kg/m2         Blood Pressure from Last 3 Encounters:   10/01/18 104/62   07/20/18 102/69   07/13/18 110/50    Weight from Last 3 Encounters:   10/01/18 181 lb (82.1 kg)   07/17/18 207 lb (93.9 kg)   07/13/18 207 lb 12.8 oz (94.3 kg)              We Performed the Following     TSH          Today's Medication Changes          These changes are accurate as of 10/1/18 12:38 PM.  If you have any questions, ask your nurse or doctor.               Stop taking these medicines if you haven't already. Please contact your care team if you have questions.     folic acid 1 MG tablet   Commonly known as:  FOLVITE   Stopped by:  Estefani Robles MD           senna-docusate 8.6-50 MG per tablet   Commonly known as:  SENOKOT-S;PERICOLACE   Stopped by:  Estefani Robles MD                    Primary Care Provider Fax #    Physician No Ref-Primary [phone]       No address on file        Equal Access to Services     [facility] : Hademani Owens, waaxda luqadaha, qaybta kaalwhitney rebolledo. So [facility] [phone].    ATENCIÓN: Si habla español, tiene a celis disposición servicios gratuitos de asistencia lingüística. Llame al [phone].    We comply with applicable federal civil rights laws and [place] laws. We do not discriminate on the basis of race, color, national origin, age, disability, sex, sexual orientation, or gender identity.            Thank you!     Thank you for choosing [facility] [place]  for your care. Our goal is always to provide you with excellent care. Hearing back from our patients is " one way we can continue to improve our services. Please take a few minutes to complete the written survey that you may receive in the mail after your visit with us. Thank you!             Your Updated Medication List - Protect others around you: Learn how to safely use, store and throw away your medicines at www.disposemymeds.org.          This list is accurate as of 10/1/18 12:38 PM.  Always use your most recent med list.                   Brand Name Dispense Instructions for use Diagnosis    ASPIRIN 81 PO      Take 1 tablet by mouth daily        levothyroxine 88 MCG tablet    SYNTHROID/LEVOTHROID    90 tablet    Take 1 tablet (88 mcg) by mouth daily    Hypothyroid in pregnancy, antepartum

## 2018-10-01 NOTE — PROGRESS NOTES
"  SUBJECTIVE:  Sigrid Contep,  is here for a postpartum visit.  She had a Vacuum delivery on 18 delivering a healthy baby boy, named Richar weighing 6 lbs 9.8 oz at term.      HPI:  Baby is definitely fussy and everything tells her so but she doesn't feel like it's not manageable. Just feels like typical for a . Marv and her family have been helpful.  Baby is mostly sleeping with just one wake up in the middle of the night so getting about 6+ hours at a time. Doesn't nap much at all during the day    Breastfeeding didn't quite work so is exclusively pumping and that seems to be fine.    Denies depression/anxiety and feels it's appropriate to the situation    Had very low chance of conceiving due to male factor but then did. Does want contraception until they're sure they're ready even though very low chance of spont conceiving. Patient with prothrombin 2 and mTHFR mutations so wants to do whatever is going to be safest.    Got engaged and moved in the last 6 weeks so lots of stuff going on. Back to work next week    Last PHQ-9 score on record=   PHQ-9 SCORE 10/1/2018   Total Score 2     MARIO-7 SCORE 2016 2017 10/1/2018   Total Score 0 5 3       Delivery complications:  Yes, PROM, chorio, maternal exhaustion and near syncope while pushing, fetal tachycardia  Breast feeding: Yes, no issues  Bladder problems:  No  Bowel problems/hemorrhoids:  No  Episiotomy/laceration/incision healed? Yes: no issues  Vaginal flow:  None, had very mild period 2 wks ago  [place]:  Not currently  Contraception: IUD  Emotional adjustment:  doing well and happy  Back to work: Next Monday Oct 8th    12 point review of systems negative other than symptoms noted below.    OBJECTIVE:  Vitals: /62  Pulse 66  Ht 5' 4\" (1.626 m)  Wt 181 lb (82.1 kg)  LMP 10/09/2017  BMI 31.07 kg/m2  BMI= Body mass index is 31.07 kg/(m^2).  General - pleasant female in no acute distress.  Breast -  deferred  Abdomen - " No incision  Pelvic - EG: normal adult female, BUS: within normal limits, Vagina: well rugated, no discharge, Cervix: no lesions or CMT, Uterus: firm, normal sized and nontender, anteverted in position. Adnexae: no masses or tenderness.  Rectovaginal - deferred.    ASSESSMENT:    ICD-10-CM    1. Routine postpartum follow-up Z39.2    2. Acquired hypothyroidism E03.9 TSH       PLAN:  May resume normal activities without restrictions.  Pap smear was not done today. Last done on 01/23/15, results: wnl, HPV-. UTD for one more year    Already had a flu shot at peds office    Discussed contraception. Could certainly do jessica and then switch to combo ocp but estrogen is higher VTE risk for her. mirena could be used now and when done breastfeeding with lower hormones and no estrogen so after discussed pros/cons does think she'd like to do that. Will return in 2-4 weeks for a mirena.    tsh today and if normal will refill her med for the years    Full counseling was provided, and all questions were answered.   Return to clinic in one year for an annual visit.     Estefani Robles MD

## 2018-10-02 LAB — TSH SERPL DL<=0.005 MIU/L-ACNC: 2.55 MU/L (ref 0.4–4)

## 2018-10-02 RX ORDER — LEVOTHYROXINE SODIUM 88 UG/1
88 TABLET ORAL DAILY
Qty: 90 TABLET | Refills: 3 | Status: SHIPPED | OUTPATIENT
Start: 2018-10-02 | End: 2019-10-14

## 2018-10-02 ASSESSMENT — ANXIETY QUESTIONNAIRES: GAD7 TOTAL SCORE: 3

## 2018-10-02 ASSESSMENT — PATIENT HEALTH QUESTIONNAIRE - PHQ9: SUM OF ALL RESPONSES TO PHQ QUESTIONS 1-9: 2

## 2018-10-26 ENCOUNTER — MYC MEDICAL ADVICE (OUTPATIENT)
Dept: OBGYN | Facility: CLINIC | Age: 33
End: 2018-10-26

## 2018-10-26 NOTE — TELEPHONE ENCOUNTER
No issues to go back to work. May have mild cramping but nothing that would keep her from working. Take 600-800mg ibuprofen 1-2 hrs before IUD and that should help    Not sure what her vit B level or her gene mutation would have to do with each other. Will need to look into what connection that has and then I can let he know at appointment and we can do labs at that time if seems appropriate to do so.

## 2018-10-26 NOTE — TELEPHONE ENCOUNTER
See Mychart msg below-  Please advise regarding pt's 2nd question Vit B level and nitrous oxide-    Routing to Dr Robles to advise.

## 2018-10-29 ENCOUNTER — OFFICE VISIT (OUTPATIENT)
Dept: OBGYN | Facility: CLINIC | Age: 33
End: 2018-10-29
Payer: COMMERCIAL

## 2018-10-29 VITALS
DIASTOLIC BLOOD PRESSURE: 62 MMHG | SYSTOLIC BLOOD PRESSURE: 110 MMHG | HEIGHT: 64 IN | WEIGHT: 183 LBS | HEART RATE: 70 BPM | BODY MASS INDEX: 31.24 KG/M2

## 2018-10-29 DIAGNOSIS — Z30.430 ENCOUNTER FOR INSERTION OF INTRAUTERINE CONTRACEPTIVE DEVICE: Primary | ICD-10-CM

## 2018-10-29 DIAGNOSIS — Z15.89 MTHFR MUTATION: ICD-10-CM

## 2018-10-29 DIAGNOSIS — D68.52 PROTHROMBIN GENE MUTATION (H): ICD-10-CM

## 2018-10-29 LAB
BETA HCG QUAL IFA URINE: NEGATIVE
FOLATE SERPL-MCNC: 18.7 NG/ML
VIT B12 SERPL-MCNC: 435 PG/ML (ref 193–986)

## 2018-10-29 PROCEDURE — 84703 CHORIONIC GONADOTROPIN ASSAY: CPT | Performed by: OBSTETRICS & GYNECOLOGY

## 2018-10-29 PROCEDURE — 82607 VITAMIN B-12: CPT | Performed by: OBSTETRICS & GYNECOLOGY

## 2018-10-29 PROCEDURE — 58300 INSERT INTRAUTERINE DEVICE: CPT | Performed by: OBSTETRICS & GYNECOLOGY

## 2018-10-29 PROCEDURE — 36415 COLL VENOUS BLD VENIPUNCTURE: CPT | Performed by: OBSTETRICS & GYNECOLOGY

## 2018-10-29 PROCEDURE — 99212 OFFICE O/P EST SF 10 MIN: CPT | Mod: 25 | Performed by: OBSTETRICS & GYNECOLOGY

## 2018-10-29 PROCEDURE — 84425 ASSAY OF VITAMIN B-1: CPT | Mod: 90 | Performed by: OBSTETRICS & GYNECOLOGY

## 2018-10-29 PROCEDURE — 82746 ASSAY OF FOLIC ACID SERUM: CPT | Performed by: OBSTETRICS & GYNECOLOGY

## 2018-10-29 PROCEDURE — 99000 SPECIMEN HANDLING OFFICE-LAB: CPT | Performed by: OBSTETRICS & GYNECOLOGY

## 2018-10-29 PROCEDURE — 84207 ASSAY OF VITAMIN B-6: CPT | Mod: 90 | Performed by: OBSTETRICS & GYNECOLOGY

## 2018-10-29 NOTE — MR AVS SNAPSHOT
"              After Visit Summary   10/29/2018    Sigrid Mann    MRN: [id number]           Patient Information     Date Of Birth          1985        Visit Information        Provider Department      10/29/2018 8:40 AM Estefani Robles MD [facility] [place]        Today's Diagnoses     Encounter for insertion of intrauterine contraceptive device    -  1    MTHFR mutation (H)        Prothrombin gene mutation (H)           Follow-ups after your visit        Who to contact     If you have questions or need follow up information about today's clinic visit or your schedule please contact [facility] directly at [phone].  Normal or non-critical lab and imaging results will be communicated to you by [employer], letter or phone within 4 business days after the clinic has received the results. If you do not hear from us within 7 days, please contact the clinic through [employer] or phone. If you have a critical or abnormal lab result, we will notify you by phone as soon as possible.  Submit refill requests through [employer] or call your pharmacy and they will forward the refill request to us. Please allow 3 business days for your refill to be completed.          Additional Information About Your Visit        MyChart Information     [employer] gives you secure access to your electronic health record. If you see a primary care provider, you can also send messages to your care team and make appointments. If you have questions, please call your primary care clinic.  If you do not have a primary care provider, please call [phone] and they will assist you.        Care EveryWhere ID     This is your Care EveryWhere ID. This could be used by other organizations to access your [facility] medical records  [id number]        Your Vitals Were     Pulse Height BMI (Body Mass Index)             70 5' 4\" (1.626 m) 31.41 kg/m2          Blood Pressure from Last 3 Encounters:   10/29/18 110/62   10/01/18 " 104/62   07/20/18 102/69    Weight from Last 3 Encounters:   10/29/18 183 lb (83 kg)   10/01/18 181 lb (82.1 kg)   07/17/18 207 lb (93.9 kg)              We Performed the Following     Beta HCG Qual, Urine - [facility] and [place] ([id number])     Folate     HC LEVONORGESTREL IU 52MG 5 YR     INSERTION INTRAUTERINE DEVICE     Vitamin B1 plasma     Vitamin B12     Vitamin B6          Today's Medication Changes          These changes are accurate as of 10/29/18 12:51 PM.  If you have any questions, ask your nurse or doctor.               Start taking these medicines.        Dose/Directions    levonorgestrel 20 MCG/24HR IUD   Commonly known as:  MIRENA   Used for:  Encounter for insertion of intrauterine contraceptive device   Started by:  Estefani Robles MD        Dose:  1 each   1 each (20 mcg) by Intrauterine route continuous Lot # [id number] Exp: April 2021 NDC: 47248-165-35   Refills:  0            Where to get your medicines      Some of these will need a paper prescription and others can be bought over the counter.  Ask your nurse if you have questions.     You don't need a prescription for these medications     levonorgestrel 20 MCG/24HR IUD                Primary Care Provider Fax #    Physician No Ref-Primary [phone]       No address on file        Equal Access to Services     [employer] AH: Marianne mooreo Somarielle, waaxda luqadaha, qaybta kaalmada adeegyada, whitney pacheco. So [facility] [phone].    ATENCIÓN: Si habla español, tiene a celis disposición servicios gratuitos de asistencia lingüística. Llame al [phone].    We comply with applicable federal civil rights laws and [place] laws. We do not discriminate on the basis of race, color, national origin, age, disability, sex, sexual orientation, or gender identity.            Thank you!     Thank you for choosing [facility] [place]  for your care. Our goal is always to provide you with excellent care. Hearing  back from our patients is one way we can continue to improve our services. Please take a few minutes to complete the written survey that you may receive in the mail after your visit with us. Thank you!             Your Updated Medication List - Protect others around you: Learn how to safely use, store and throw away your medicines at www.disposemymeds.org.          This list is accurate as of 10/29/18 12:51 PM.  Always use your most recent med list.                   Brand Name Dispense Instructions for use Diagnosis    ASPIRIN 81 PO      Take 1 tablet by mouth daily        levonorgestrel 20 MCG/24HR IUD    MIRENA     1 each (20 mcg) by Intrauterine route continuous Lot # [id number] Exp: April 2021 NDC: 45853-973-45    Encounter for insertion of intrauterine contraceptive device       levothyroxine 88 MCG tablet    SYNTHROID/LEVOTHROID    90 tablet    Take 1 tablet (88 mcg) by mouth daily    Hypothyroid in pregnancy, antepartum

## 2018-10-29 NOTE — PROGRESS NOTES
"  IUD Insertion:  CONSULT:    Is a pregnancy test required: Yes.  Was it positive or negative?  Negative  Was a consent obtained?  Yes    Subjective: Sigrid Mann is a 33 year old  presents for IUD and desires Mirena type IUD.    Patient has been given the opportunity to ask questions about all forms of birth control, including all options appropriate for Sigrid Mann. Discussed that no method of birth control, except abstinence is 100% effective against pregnancy or sexually transmitted infection.     Sigrid Mann understands she may have the IUD removed at any time. IUD should be removed by a health care provider.    The entire insertion procedure was reviewed with the patient, including care after placement.    No LMP recorded. Last sexual activity: Before birth of baby. No allergy to betadine or shellfish. Patient declines STD screening   HCG: Negative    /62  Pulse 70  Ht 5' 4\" (1.626 m)  Wt 183 lb (83 kg)  BMI 31.41 kg/m2    Pelvic Exam:   EG/BUS: normal genital architecture without lesions, erythema or abnormal secretions.   Vagina: moist, pink, rugae with physiologic discharge and secretions  Cervix: multiparous no lesions and pink, moist, closed, without lesion or CMT  Uterus: midposition, mobile, no pain  Adnexa: within normal limits and no masses, nodularity, tenderness    PROCEDURE NOTE: -- IUD Insertion    Reason for Insertion: contraception    Patient didn't pre-medicate  Under sterile technique, cervix was visualized with speculum and prepped with Betadine solution swab x 3. Tenaculum was placed for stability. The uterus was gently straightened and sounded to 7.0 cm. IUD prepared for placement, and IUD inserted according to 's instructions without difficulty or significant resitance, and deployed at the fundus. The strings were visualized and trimmed to 3.0 cm from the external os. Tenaculum was removed and hemostasis noted. Speculum removed.  Patient tolerated " procedure well.    Lot # [id number]  Exp: April 2021  NDC: 96328-100-53    EBL: minimal    Complications: none    Patient and I discussed that during her labor the MDA stated that b/c of her gene mutation she would need more anesthesia with the nitrous than we could give and so it was denied to her. She was then asked if she'd had any of her B vitamins tested and she hadn't . Isn't sure why this was asked of her or what mutation it pertains to, the mthfr or the prothrombin 2. Has tried to do some research and has bad sinus infections and taht's been linked to b vitamin deficiency, etc. Would like her B vitamins checked    ASSESSMENT:     ICD-10-CM    1. Encounter for insertion of intrauterine contraceptive device Z30.430 Beta HCG Qual, Urine - [facility] [place] ([id number])     HC LEVONORGESTREL IU 52MG 5 YR     levonorgestrel (MIRENA) 20 MCG/24HR IUD     INSERTION INTRAUTERINE DEVICE   2. MTHFR mutation (H) E72.12 Vitamin B12     Vitamin B6     Vitamin B1 plasma     Folate   3. Prothrombin gene mutation (H) D68.52 Vitamin B12     Vitamin B6     Vitamin B1 plasma     Folate        PLAN:    Given 's handouts, including when to have IUD removed, list of danger s/sx, side effects and follow up recommended. Encouraged condom use for prevention of STD. Back up contraception advised for 7 days if progestin method. Advised to call for any fever, for prolonged or severe pain or bleeding, abnormal vaginal discharge, or unable to palpate strings. She was advised to use pain medications (ibuprofen) as needed for mild to moderate pain. Advised to follow-up in clinic in 4-6 weeks for IUD string check if unable to find strings or as directed by provider.     Discussed her mutations and the risks for VTE and heard disease vs risks for the pregnancy and future pregnancy. Did some research to try to see what the B vitamin link may be that the [facility] was referring to but wasn't able to find anything. Will test her B vitamins and  see if they are normal. There is MTHFR and folate connection so that may be what was being referred to but will just check them all and see where it falls.  Spent an additional 10 min of counseling time on top of the IUD insertion    Estefani Robles MD

## 2018-10-31 LAB
VIT B1 SERPL-MCNC: 5 NMOL/L (ref 4–15)
VIT B6 SERPL-MCNC: 65.6 NMOL/L (ref 20–125)

## 2018-11-15 ENCOUNTER — MYC MEDICAL ADVICE (OUTPATIENT)
Dept: OBGYN | Facility: CLINIC | Age: 33
End: 2018-11-15

## 2018-12-27 ENCOUNTER — MYC REFILL (OUTPATIENT)
Dept: OBGYN | Facility: CLINIC | Age: 33
End: 2018-12-27

## 2018-12-27 DIAGNOSIS — O99.280 HYPOTHYROID IN PREGNANCY, ANTEPARTUM: ICD-10-CM

## 2018-12-27 DIAGNOSIS — E03.9 HYPOTHYROID IN PREGNANCY, ANTEPARTUM: ICD-10-CM

## 2018-12-27 RX ORDER — LEVOTHYROXINE SODIUM 88 UG/1
88 TABLET ORAL DAILY
Qty: 90 TABLET | Refills: 3 | OUTPATIENT
Start: 2018-12-27

## 2018-12-27 NOTE — TELEPHONE ENCOUNTER
"Requested Prescriptions   Pending Prescriptions Disp Refills     levothyroxine (SYNTHROID/LEVOTHROID) 88 MCG tablet 90 tablet 3     Sig: Take 1 tablet (88 mcg) by mouth daily    Thyroid Protocol Passed - 12/27/2018  8:31 AM       Passed - Patient is 12 years or older       Passed - Recent (12 mo) or future (30 days) visit within the authorizing provider's specialty    Patient had office visit in the last 12 months or has a visit in the next 30 days with authorizing provider or within the authorizing provider's specialty.  See \"Patient Info\" tab in inbasket, or \"Choose Columns\" in Meds & Orders section of the refill encounter.             Passed - Normal TSH on file in past 12 months    Recent Labs   Lab Test 10/01/18  1108   TSH 2.55             Passed - No active pregnancy on record    If patient is pregnant or has had a positive pregnancy test, please check TSH.         Passed - No positive pregnancy test in past 12 months    If patient is pregnant or has had a positive pregnancy test, please check TSH.          Pt has refills available  Corrie Modi RN on 12/27/2018 at 8:41 AM          "

## 2019-10-14 DIAGNOSIS — E03.9 HYPOTHYROID IN PREGNANCY, ANTEPARTUM: ICD-10-CM

## 2019-10-14 DIAGNOSIS — O99.280 HYPOTHYROID IN PREGNANCY, ANTEPARTUM: ICD-10-CM

## 2019-10-14 RX ORDER — LEVOTHYROXINE SODIUM 88 UG/1
TABLET ORAL
Qty: 30 TABLET | Refills: 0 | Status: SHIPPED | OUTPATIENT
Start: 2019-10-14 | End: 2019-12-30

## 2019-10-14 NOTE — TELEPHONE ENCOUNTER
"Requested Prescriptions   Pending Prescriptions Disp Refills     levothyroxine (SYNTHROID/LEVOTHROID) 88 MCG tablet [Pharmacy Med Name: LEVOTHYROXINE 0.088MG (88MCG) TAB] 90 tablet 0     Sig: TAKE 1 TABLET BY MOUTH DAILY       Thyroid Protocol Failed - 10/14/2019  9:34 AM        Failed - Normal TSH on file in past 12 months     Recent Labs   Lab Test 10/01/18  1108   TSH 2.55              Passed - Patient is 12 years or older        Passed - Recent (12 mo) or future (30 days) visit within the authorizing provider's specialty     Patient has had an office visit with the authorizing provider or a provider within the authorizing providers department within the previous 12 mos or has a future within next 30 days. See \"Patient Info\" tab in inbasket, or \"Choose Columns\" in Meds & Orders section of the refill encounter.              Passed - Medication is active on med list        Passed - No active pregnancy on record     If patient is pregnant or has had a positive pregnancy test, please check TSH.          Passed - No positive pregnancy test in past 12 months     If patient is pregnant or has had a positive pregnancy test, please check TSH.        Medication is being filled for 1 time refill only due to:  labs needed.    Corrie Modi RN on 10/14/2019 at 9:38 AM        "

## 2019-12-05 ENCOUNTER — MYC REFILL (OUTPATIENT)
Dept: OBGYN | Facility: CLINIC | Age: 34
End: 2019-12-05

## 2019-12-05 DIAGNOSIS — O99.280 HYPOTHYROID IN PREGNANCY, ANTEPARTUM: ICD-10-CM

## 2019-12-05 DIAGNOSIS — E03.9 HYPOTHYROID IN PREGNANCY, ANTEPARTUM: ICD-10-CM

## 2019-12-05 RX ORDER — LEVOTHYROXINE SODIUM 88 UG/1
88 TABLET ORAL DAILY
Qty: 30 TABLET | Refills: 0 | OUTPATIENT
Start: 2019-12-05

## 2019-12-05 NOTE — TELEPHONE ENCOUNTER
"Requested Prescriptions   Pending Prescriptions Disp Refills     levothyroxine (SYNTHROID/LEVOTHROID) 88 MCG tablet 30 tablet 0     Sig: Take 1 tablet (88 mcg) by mouth daily       Thyroid Protocol Failed - 12/5/2019  2:23 PM        Failed - Recent (12 mo) or future (30 days) visit within the authorizing provider's specialty     Patient has had an office visit with the authorizing provider or a provider within the authorizing providers department within the previous 12 mos or has a future within next 30 days. See \"Patient Info\" tab in inbasket, or \"Choose Columns\" in Meds & Orders section of the refill encounter.              Failed - Normal TSH on file in past 12 months     Recent Labs   Lab Test 10/01/18  1108   TSH 2.55              Passed - Patient is 12 years or older        Passed - Medication is active on med list        Passed - No active pregnancy on record     If patient is pregnant or has had a positive pregnancy test, please check TSH.          Passed - No positive pregnancy test in past 12 months     If patient is pregnant or has had a positive pregnancy test, please check TSH.          Pt due for annual, no appt scheduled. Pt already received one month extension. Rx denied.     "

## 2019-12-16 DIAGNOSIS — O99.280 HYPOTHYROID IN PREGNANCY, ANTEPARTUM: ICD-10-CM

## 2019-12-16 DIAGNOSIS — E03.9 HYPOTHYROID IN PREGNANCY, ANTEPARTUM: ICD-10-CM

## 2019-12-17 RX ORDER — LEVOTHYROXINE SODIUM 88 UG/1
TABLET ORAL
Qty: 30 TABLET | Refills: 0 | OUTPATIENT
Start: 2019-12-17

## 2019-12-17 NOTE — TELEPHONE ENCOUNTER
"Requested Prescriptions   Pending Prescriptions Disp Refills     levothyroxine (SYNTHROID/LEVOTHROID) 88 MCG tablet [Pharmacy Med Name: LEVOTHYROXINE 0.088MG (88MCG) TAB] 30 tablet 0     Sig: TAKE 1 TABLET BY MOUTH DAILY       Thyroid Protocol Failed - 12/16/2019  3:02 PM        Failed - Recent (12 mo) or future (30 days) visit within the authorizing provider's specialty     Patient has had an office visit with the authorizing provider or a provider within the authorizing providers department within the previous 12 mos or has a future within next 30 days. See \"Patient Info\" tab in inbasket, or \"Choose Columns\" in Meds & Orders section of the refill encounter.              Failed - Normal TSH on file in past 12 months     Recent Labs   Lab Test 10/01/18  1108   TSH 2.55              Passed - Patient is 12 years or older        Passed - Medication is active on med list        Passed - No active pregnancy on record     If patient is pregnant or has had a positive pregnancy test, please check TSH.          Passed - No positive pregnancy test in past 12 months     If patient is pregnant or has had a positive pregnancy test, please check TSH.          Last Written Prescription Date:  10/14/19  Last Fill Quantity: 30,  # refills: 0   Last office visit: 10/29/2018 with prescribing provider:  DR Robles   Future Office Visit:    Pt due for annual, no appt scheduled. Pt already received one month extension. Rx denied.   Linn Stevens RN on 12/17/2019 at 7:34 AM      "

## 2019-12-19 ENCOUNTER — TELEPHONE (OUTPATIENT)
Dept: OBGYN | Facility: CLINIC | Age: 34
End: 2019-12-19

## 2019-12-19 DIAGNOSIS — E03.9 ACQUIRED HYPOTHYROIDISM: Primary | ICD-10-CM

## 2019-12-19 NOTE — TELEPHONE ENCOUNTER
Left msg pt needs an appointment for an annual exam and labs. When appt scheduled can refill synthroid.   Corrie Modi RN on 12/19/2019 at 11:02 AM

## 2019-12-19 NOTE — TELEPHONE ENCOUNTER
Pt states Dr. Robles told her she did not need to come in for a OV for two years and is wondering why she cant get a years refill on her medication.

## 2019-12-28 DIAGNOSIS — E03.9 HYPOTHYROID IN PREGNANCY, ANTEPARTUM: ICD-10-CM

## 2019-12-28 DIAGNOSIS — O99.280 HYPOTHYROID IN PREGNANCY, ANTEPARTUM: ICD-10-CM

## 2019-12-30 RX ORDER — LEVOTHYROXINE SODIUM 88 UG/1
88 TABLET ORAL DAILY
Qty: 30 TABLET | Refills: 0 | Status: SHIPPED | OUTPATIENT
Start: 2019-12-30 | End: 2020-02-08

## 2019-12-30 NOTE — TELEPHONE ENCOUNTER
"Requested Prescriptions   Pending Prescriptions Disp Refills     levothyroxine (SYNTHROID/LEVOTHROID) 88 MCG tablet [Pharmacy Med Name: LEVOTHYROXINE 0.088MG (88MCG) TAB] 30 tablet 0     Sig: TAKE 1 TABLET BY MOUTH DAILY       Thyroid Protocol Failed - 12/28/2019 12:02 PM        Failed - Recent (12 mo) or future (30 days) visit within the authorizing provider's specialty     Patient has had an office visit with the authorizing provider or a provider within the authorizing providers department within the previous 12 mos or has a future within next 30 days. See \"Patient Info\" tab in inbasket, or \"Choose Columns\" in Meds & Orders section of the refill encounter.              Failed - Normal TSH on file in past 12 months     Recent Labs   Lab Test 10/01/18  1108   TSH 2.55              Passed - Patient is 12 years or older        Passed - Medication is active on med list        Passed - No active pregnancy on record     If patient is pregnant or has had a positive pregnancy test, please check TSH.          Passed - No positive pregnancy test in past 12 months     If patient is pregnant or has had a positive pregnancy test, please check TSH.          Last Written Prescription Date:  10/14/19  Last Fill Quantity: 30,  # refills: 0   Last office visit: 10/29/2018 with prescribing provider:  Margaret   Future Office Visit:   Next 5 appointments (look out 90 days)    Feb 05, 2020  3:30 PM CST  PHYSICAL with Estefani Robles MD  [facility] ([facility]) [address] [place] [id number] [phone]         Medication is being filled for 1 time refill only due to:  Patient needs to be seen because it has been more than one year since last visit. Frieda Najera, RN on 12/30/2019 at 7:37 AM    "

## 2020-01-28 RX ORDER — LEVOTHYROXINE SODIUM 88 UG/1
88 TABLET ORAL DAILY
Qty: 30 TABLET | Refills: 0 | Status: SHIPPED | OUTPATIENT
Start: 2020-01-28 | End: 2020-02-05

## 2020-01-28 NOTE — TELEPHONE ENCOUNTER
Requested Prescriptions   Pending Prescriptions Disp Refills     levothyroxine (SYNTHROID/LEVOTHROID) 88 MCG tablet 30 tablet 0     Sig: Take 1 tablet (88 mcg) by mouth daily       There is no refill protocol information for this order        Last Written Prescription Date:  12/30/19  Last Fill Quantity: 30,  # refills: 0   Last office visit: 10/29/2018 with prescribing provider:  Margaret   Future Office Visit:   Next 5 appointments (look out 90 days)    Feb 05, 2020  3:30 PM CST  PHYSICAL with Estefani Robles MD  [facility] [address] [place] [id number]  [phone]         Medication is being filled for 1 time refill only due to:  appointment scheduled   Frieda Najera RN on 1/28/2020 at 9:23 AM

## 2020-01-28 NOTE — TELEPHONE ENCOUNTER
Patient called and needs more of her Thyroid  meds to get her through till her Annual with  on 2/4. Please call script in to pharmacy.

## 2020-02-03 NOTE — PROGRESS NOTES
"  Sigrid is a 34 year old  female who presents for annual exam.     Besides routine health maintenance, she has no other health concerns today, she would like her thyroid held until labs come back as she is curious if dose will change as she has been cold lately .    HPI:  The patient does not use a PCP.    Patient is doing well overall. Has her mirena for just over a year. Still getting a monthly period though. The period is much lighter and much shorter than It ever was. Sometimes is literally a day of spotting or 2 days and just when she wipes. Will hardly even ever need a tampon or a liner so very happy with it. Had assumed they may not be able to conceive at all b/c of her partners' male factor infertility but then were able to. Now don't think that they'll have more kids but certainly not ready to completely rule it out either. Very happy with her IUD    Has had hypothyroidism for years. Haven't checked her TSH more than at her PP visit and was overdue for this appointment. Was off her med for about a month b/c the rx lapsed. Started to feel much more cold and \"off\"  Has been been back on it for a month after calling for a med extension. Already feeling a little less cold but still not normal. Feels like her whole body Is cold but her hands and feet specifically. Isn't having any pain or coloration change in her fingers or toes though. Isn't noticing any other sx like weight changes that aren't intentional, sleep changes, heart palpitations, fatigue, change in GI function, etc.    Patient has been doing a keto diet. Isn't following it exactly by any means and is occasionally have sugars and sweets and more often some fruits and veggies that would typically not be allowed. Feels better this way, is working harder now on getting her baby weight off. Is down about 20# from her PP visit and had been a bit up from even that since here last.      GYNECOLOGIC HISTORY:    Patient's last menstrual period was " 2020.    Her current contraception method is: IUD.  She  reports that she has never smoked. She has never used smokeless tobacco.    Patient is sexually active.  STD testing offered?  Declined  Last PHQ-9 score on record =   PHQ-9 SCORE 2020   PHQ-9 Total Score 1     Last GAD7 score on record =   MARIO-7 SCORE 2020   Total Score 0     Alcohol Score = 3    HEALTH MAINTENANCE:  Cholesterol:   Cholesterol   Date Value Ref Range Status   2016 213 (H) <200 mg/dL Final     Comment:     Desirable:       <200 mg/dl      Last Mammo: Not applicable, Result: Not applicable, Next Mammo: Due at age 39   Pap: 2015 NIL (?? HPV)  Lab Results   Component Value Date    PAP Negative 2013    PAP LSIL 2012      Colonoscopy:  never, Result: Not applicable, Next Colonoscopy: age 49.  Dexa:  never    Health maintenance updated:  yes    HISTORY:  OB History    Para Term  AB Living   1 1 1 0 0 1   SAB TAB Ectopic Multiple Live Births   0 0 0 0 1      # Outcome Date GA Lbr Arnie/2nd Weight Sex Delivery Anes PTL Lv   1 Term 18 39w0d 05:30 / 02:03 3 kg (6 lb 9.8 oz) M Vag-Vacuum EPI N YOSHI      Birth Comments: PROM for 25 hours. ROM at 2145, pit started at 0600. developed chorio about 1hour before delivery. did vacuum for maternal fever/exhaustion/tachycardia/near syncope with pushing and baby to NICU but stable. 2nd degree lac veering to left. ebl 500cc       Complications: Prolonged PROM (>18 hours)      Name: Richar      Apgar1: 9  Apgar5: 9       Patient Active Problem List   Diagnosis     Family history of heart attack     Herpes simplex vulvovaginitis     Cold sore     Prothrombin gene mutation (H)     MTHFR mutation (H)     Acquired hypothyroidism     Presence of of 52 mg levonorgestrel-releasing intrauterine device (IUD)     High serum ferritin     Past Surgical History:   Procedure Laterality Date     CYSTOSCOPY      cystoscopy, normal      Social History     Tobacco Use      "Smoking status: Never Smoker     Smokeless tobacco: Never Used   Substance Use Topics     Alcohol use: Yes     Alcohol/week: 0.0 standard drinks     Comment: none since knew pg      Problem (# of Occurrences) Relation (Name,Age of Onset)    Breast Cancer (1) Paternal Aunt: mid 50s    Cerebrovascular Disease (1) Paternal Grandmother    Coronary Artery Disease (1) Father (60): summer 2014    Hyperlipidemia (1) Father    Hypertension (1) Father (60)            Current Outpatient Medications   Medication Sig     levonorgestrel (MIRENA) 20 MCG/24HR IUD 1 each (20 mcg) by Intrauterine route continuous Lot # [id number]  Exp: April 2021  NDC: 84769-303-26     levothyroxine (SYNTHROID/LEVOTHROID) 88 MCG tablet Take 1 tablet (88 mcg) by mouth daily Appointment needed for additional refills.     No current facility-administered medications for this visit.      No Known Allergies    Past medical, surgical, social and family histories were reviewed and updated in EPIC.    ROS:   12 point review of systems negative other than symptoms noted below or in the HPI.  Endocrine: Cold Intolerance  No urinary frequency or dysuria, bladder or kidney problems, POSITIVE for:, cold intolerance and light periods    EXAM:  /68   Pulse 76   Ht 1.638 m (5' 4.5\")   Wt 74.8 kg (165 lb)   LMP 02/04/2020   BMI 27.88 kg/m     BMI: Body mass index is 27.88 kg/m .    PHYSICAL EXAM:  Constitutional:   Appearance: Well nourished, well developed, alert, in no acute distress  Neck:  Lymph Nodes:  No lymphadenopathy present    Thyroid:  Gland size normal, nontender, no nodules or masses present  on palpation  Chest:  Respiratory Effort:  Breathing unlabored  Cardiovascular:    Heart: Auscultation:  Regular rate, normal rhythm, no murmurs present  Breasts: Palpation of Breasts and Axillae:  No masses present on palpation, no breast tenderness. and No nodularity, asymmetry or nipple discharge bilaterally.  Gastrointestinal:   Abdominal Examination:  " Abdomen nontender to palpation, tone normal without rigidity or guarding, no masses present, umbilicus without lesions   Liver and Spleen:  No hepatomegaly present, liver nontender to palpation    Hernias:  No hernias present  Lymphatic: Lymph Nodes:  No other lymphadenopathy present  Skin:  General Inspection:  No rashes present, no lesions present, no areas of  discoloration  Neurologic:    Mental Status:  Oriented X3.  Normal strength and tone, sensory exam                grossly normal, mentation intact and speech normal.    Psychiatric:   Mentation appears normal and affect normal/bright.         Pelvic Exam:  External Genitalia:     Normal appearance for age, no discharge present, no tenderness present, no inflammatory lesions present, color normal  Vagina:    Normal vaginal vault without central or paravaginal defects, no discharge present, no inflammatory lesions present, no masses present  Bladder:     Nontender to palpation  Urethra:   Urethral Body:  Urethra palpation normal, urethra structural support normal   Urethral Meatus:  No erythema or lesions present  Cervix:     Appearance healthy, no lesions present, nontender to palpation, no bleeding present, string present  Uterus:     Nontender to palpation, no masses present, position anteflexed, mobility: normal  Adnexa:     No adnexal tenderness present, no adnexal masses present  Perineum:     Perineum within normal limits, no evidence of trauma, no rashes or skin lesions present  Anus:     Anus within normal limits, no hemorrhoids present  Inguinal Lymph Nodes:     No lymphadenopathy present  Pubic Hair:     Normal pubic hair distribution for age  Genitalia and Groin:     No rashes present, no lesions present, no areas of discoloration, no masses present      COUNSELING:   Reviewed preventive health counseling, as reflected in patient instructions  Special attention given to:        Regular exercise       Healthy diet/nutrition        Contraception    BMI: Body mass index is 27.88 kg/m .  Weight management plan: Discussed healthy diet and exercise guidelines    ASSESSMENT:  34 year old female with satisfactory annual exam.    ICD-10-CM    1. Encounter for gynecological examination without abnormal finding Z01.419 Pap imaged thin layer screen with HPV - recommended age 30 - 65     HPV High Risk Types DNA Cervical   2. Acquired hypothyroidism E03.9 TSH     levothyroxine (SYNTHROID/LEVOTHROID) 88 MCG tablet   3. Cold intolerance R68.89 Ferritin     Iron and iron binding capacity     Vitamin B12     Vitamin D Deficiency     Comprehensive metabolic panel   4. Ketogenic diet monitoring encounter Z71.3 Ferritin     Iron and iron binding capacity     Vitamin B12     Vitamin D Deficiency     Comprehensive metabolic panel   5. High serum ferritin R79.89 **CBC with platelets FUTURE anytime     **Iron and iron binding capacity FUTURE anytime     **Ferritin FUTURE 6mo   6. Presence of of 52 mg levonorgestrel-releasing intrauterine device (IUD) Z97.5        PLAN:  Pap and cotesting done today    Will check her TSH and will refill her levoxyl if it's in range. If it's still on the upper end of normal will actually give it another 4-6 weeks and then have her repeat it b/c only back on it a month. O/w if well within normal range will then fill it for a full year    Will check some vitamin levels and a metabolic panel to r/o any other cause of her feeling cold all the time. Now especially with her following a keto diet and she is not eating red meat, or much meat at all, just wants to know that her iron and vitamin levels are ok    Discussed general healthy eating and exercise vs keto diet and its drawbacks. Do not encourage full keto as true ketosis is not safe and only in someone morbidly obese who can pull glucose from fat stores, someone with an almost normal BMI doesn't have those excess stores and can actually harm themselves with ketosis.    IUD is working  well and really happy with it. Discussed expectations for periods/bleeding over the next 4 yrs or so    Estefani Robles MD

## 2020-02-05 ENCOUNTER — OFFICE VISIT (OUTPATIENT)
Dept: OBGYN | Facility: CLINIC | Age: 35
End: 2020-02-05
Payer: COMMERCIAL

## 2020-02-05 VITALS
BODY MASS INDEX: 27.49 KG/M2 | WEIGHT: 165 LBS | SYSTOLIC BLOOD PRESSURE: 104 MMHG | HEART RATE: 76 BPM | HEIGHT: 65 IN | DIASTOLIC BLOOD PRESSURE: 68 MMHG

## 2020-02-05 DIAGNOSIS — Z71.3 KETOGENIC DIET MONITORING ENCOUNTER: ICD-10-CM

## 2020-02-05 DIAGNOSIS — R68.89 COLD INTOLERANCE: ICD-10-CM

## 2020-02-05 DIAGNOSIS — Z01.419 ENCOUNTER FOR GYNECOLOGICAL EXAMINATION WITHOUT ABNORMAL FINDING: Primary | ICD-10-CM

## 2020-02-05 DIAGNOSIS — R79.89 HIGH SERUM FERRITIN: ICD-10-CM

## 2020-02-05 DIAGNOSIS — E03.9 ACQUIRED HYPOTHYROIDISM: ICD-10-CM

## 2020-02-05 DIAGNOSIS — Z97.5 PRESENCE OF OF 52 MG LEVONORGESTREL-RELEASING INTRAUTERINE DEVICE (IUD): ICD-10-CM

## 2020-02-05 PROCEDURE — 82728 ASSAY OF FERRITIN: CPT | Performed by: OBSTETRICS & GYNECOLOGY

## 2020-02-05 PROCEDURE — 99395 PREV VISIT EST AGE 18-39: CPT | Performed by: OBSTETRICS & GYNECOLOGY

## 2020-02-05 PROCEDURE — 80053 COMPREHEN METABOLIC PANEL: CPT | Performed by: OBSTETRICS & GYNECOLOGY

## 2020-02-05 PROCEDURE — 83550 IRON BINDING TEST: CPT | Performed by: OBSTETRICS & GYNECOLOGY

## 2020-02-05 PROCEDURE — 84443 ASSAY THYROID STIM HORMONE: CPT | Performed by: OBSTETRICS & GYNECOLOGY

## 2020-02-05 PROCEDURE — 82607 VITAMIN B-12: CPT | Performed by: OBSTETRICS & GYNECOLOGY

## 2020-02-05 PROCEDURE — 82306 VITAMIN D 25 HYDROXY: CPT | Performed by: OBSTETRICS & GYNECOLOGY

## 2020-02-05 PROCEDURE — G0145 SCR C/V CYTO,THINLAYER,RESCR: HCPCS | Performed by: OBSTETRICS & GYNECOLOGY

## 2020-02-05 PROCEDURE — 87624 HPV HI-RISK TYP POOLED RSLT: CPT | Performed by: OBSTETRICS & GYNECOLOGY

## 2020-02-05 PROCEDURE — 83540 ASSAY OF IRON: CPT | Performed by: OBSTETRICS & GYNECOLOGY

## 2020-02-05 PROCEDURE — 36415 COLL VENOUS BLD VENIPUNCTURE: CPT | Performed by: OBSTETRICS & GYNECOLOGY

## 2020-02-05 RX ORDER — LEVOTHYROXINE SODIUM 88 UG/1
88 TABLET ORAL DAILY
Qty: 30 TABLET | Refills: 0 | Status: CANCELLED | OUTPATIENT
Start: 2020-02-05

## 2020-02-05 ASSESSMENT — MIFFLIN-ST. JEOR: SCORE: 1436.38

## 2020-02-05 ASSESSMENT — ANXIETY QUESTIONNAIRES
GAD7 TOTAL SCORE: 0
5. BEING SO RESTLESS THAT IT IS HARD TO SIT STILL: NOT AT ALL
3. WORRYING TOO MUCH ABOUT DIFFERENT THINGS: NOT AT ALL
6. BECOMING EASILY ANNOYED OR IRRITABLE: NOT AT ALL
7. FEELING AFRAID AS IF SOMETHING AWFUL MIGHT HAPPEN: NOT AT ALL
1. FEELING NERVOUS, ANXIOUS, OR ON EDGE: NOT AT ALL
2. NOT BEING ABLE TO STOP OR CONTROL WORRYING: NOT AT ALL

## 2020-02-05 ASSESSMENT — PATIENT HEALTH QUESTIONNAIRE - PHQ9
5. POOR APPETITE OR OVEREATING: NOT AT ALL
SUM OF ALL RESPONSES TO PHQ QUESTIONS 1-9: 1

## 2020-02-05 NOTE — LETTER
February 12, 2020    Sigrid Mann  [address] [facility] [address]    Dear ,  This letter is regarding your recent Pap smear (cervical cancer screening) and Human Papillomavirus (HPV) test.  We are happy to inform you that your Pap smear result is normal. Cervical cancer is closely linked with certain types of HPV. Your results showed no evidence of high-risk HPV.  We recommend you have your next PAP smear and HPV test in 5 years.  You will still need to return to the clinic every year for an annual exam and other preventive tests.  If you have additional questions regarding this result, please call our registered nurse, Estela at [phone].  Sincerely,    Estefani Robles MD/fernando

## 2020-02-06 LAB
ALBUMIN SERPL-MCNC: 4.1 G/DL (ref 3.4–5)
ALP SERPL-CCNC: 62 U/L (ref 40–150)
ALT SERPL W P-5'-P-CCNC: 21 U/L (ref 0–50)
ANION GAP SERPL CALCULATED.3IONS-SCNC: 9 MMOL/L (ref 3–14)
AST SERPL W P-5'-P-CCNC: 15 U/L (ref 0–45)
BILIRUB SERPL-MCNC: 1.2 MG/DL (ref 0.2–1.3)
BUN SERPL-MCNC: 15 MG/DL (ref 7–30)
CALCIUM SERPL-MCNC: 9.3 MG/DL (ref 8.5–10.1)
CHLORIDE SERPL-SCNC: 106 MMOL/L (ref 94–109)
CO2 SERPL-SCNC: 23 MMOL/L (ref 20–32)
CREAT SERPL-MCNC: 0.95 MG/DL (ref 0.52–1.04)
DEPRECATED CALCIDIOL+CALCIFEROL SERPL-MC: 26 UG/L (ref 20–75)
FERRITIN SERPL-MCNC: 184 NG/ML (ref 12–150)
GFR SERPL CREATININE-BSD FRML MDRD: 78 ML/MIN/{1.73_M2}
GLUCOSE SERPL-MCNC: 74 MG/DL (ref 70–99)
IRON SATN MFR SERPL: 14 % (ref 15–46)
IRON SERPL-MCNC: 42 UG/DL (ref 35–180)
POTASSIUM SERPL-SCNC: 3.8 MMOL/L (ref 3.4–5.3)
PROT SERPL-MCNC: 8.1 G/DL (ref 6.8–8.8)
SODIUM SERPL-SCNC: 138 MMOL/L (ref 133–144)
TIBC SERPL-MCNC: 300 UG/DL (ref 240–430)
TSH SERPL DL<=0.005 MIU/L-ACNC: 0.54 MU/L (ref 0.4–4)
VIT B12 SERPL-MCNC: 310 PG/ML (ref 193–986)

## 2020-02-06 ASSESSMENT — ANXIETY QUESTIONNAIRES: GAD7 TOTAL SCORE: 0

## 2020-02-08 PROBLEM — Z97.5 PRESENCE OF OF 52 MG LEVONORGESTREL-RELEASING INTRAUTERINE DEVICE (IUD): Status: ACTIVE | Noted: 2018-10-29

## 2020-02-08 PROBLEM — R79.89 HIGH SERUM FERRITIN: Status: ACTIVE | Noted: 2020-02-08

## 2020-02-08 RX ORDER — LEVOTHYROXINE SODIUM 88 UG/1
88 TABLET ORAL DAILY
Qty: 90 TABLET | Refills: 1 | Status: SHIPPED | OUTPATIENT
Start: 2020-02-08 | End: 2020-08-25

## 2020-02-10 LAB
COPATH REPORT: NORMAL
PAP: NORMAL

## 2020-02-12 LAB
FINAL DIAGNOSIS: NORMAL
HPV HR 12 DNA CVX QL NAA+PROBE: NEGATIVE
HPV16 DNA SPEC QL NAA+PROBE: NEGATIVE
HPV18 DNA SPEC QL NAA+PROBE: NEGATIVE
SPECIMEN DESCRIPTION: NORMAL
SPECIMEN SOURCE CVX/VAG CYTO: NORMAL

## 2020-02-17 ENCOUNTER — HEALTH MAINTENANCE LETTER (OUTPATIENT)
Age: 35
End: 2020-02-17

## 2020-02-26 DIAGNOSIS — E03.9 ACQUIRED HYPOTHYROIDISM: ICD-10-CM

## 2020-02-26 NOTE — TELEPHONE ENCOUNTER
"Requested Prescriptions   Pending Prescriptions Disp Refills     levothyroxine (SYNTHROID/LEVOTHROID) 88 MCG tablet [Pharmacy Med Name: LEVOTHYROXINE 0.088MG (88MCG) TAB] 30 tablet      Sig: TAKE 1 TABLET BY MOUTH DAILY       Thyroid Protocol Passed - 2/26/2020  2:56 PM        Passed - Patient is 12 years or older        Passed - Recent (12 mo) or future (30 days) visit within the authorizing provider's specialty     Patient has had an office visit with the authorizing provider or a provider within the authorizing providers department within the previous 12 mos or has a future within next 30 days. See \"Patient Info\" tab in inbasket, or \"Choose Columns\" in Meds & Orders section of the refill encounter.              Passed - Medication is active on med list        Passed - Normal TSH on file in past 12 months     Recent Labs   Lab Test 02/05/20  1600   TSH 0.54              Passed - No active pregnancy on record     If patient is pregnant or has had a positive pregnancy test, please check TSH.          Passed - No positive pregnancy test in past 12 months     If patient is pregnant or has had a positive pregnancy test, please check TSH.          Last Written Prescription Date:  2/8/20  Last Fill Quantity: 90,  # refills: 1   Last office visit: 2/5/2020 with prescribing provider:  DR Robles   Future Office Visit:  None    "

## 2020-02-27 RX ORDER — LEVOTHYROXINE SODIUM 88 UG/1
TABLET ORAL
Qty: 30 TABLET | OUTPATIENT
Start: 2020-02-27

## 2020-02-27 NOTE — TELEPHONE ENCOUNTER
"Refill request refused due to :   [x] Refills available at pharmacy.  Request sent back to pharmacy as \"duplicate\" Message sent to [facility] pharmacy as new Rx sent 2/8/20 to [facility] location      Linn Stevens RN on 2/27/2020 at 8:05 AM    "

## 2020-08-25 DIAGNOSIS — E03.9 ACQUIRED HYPOTHYROIDISM: ICD-10-CM

## 2020-08-25 RX ORDER — LEVOTHYROXINE SODIUM 88 UG/1
88 TABLET ORAL DAILY
Qty: 90 TABLET | Refills: 1 | Status: SHIPPED | OUTPATIENT
Start: 2020-08-25 | End: 2021-02-24

## 2020-08-25 NOTE — TELEPHONE ENCOUNTER
Prescription approved per [facility] Refill Protocol.  Linn Stevens RN on 8/25/2020 at 2:16 PM

## 2020-08-25 NOTE — TELEPHONE ENCOUNTER
"Requested Prescriptions   Pending Prescriptions Disp Refills     levothyroxine (SYNTHROID/LEVOTHROID) 88 MCG tablet 90 tablet 1     Sig: Take 1 tablet (88 mcg) by mouth daily Appointment needed for additional refills.       Thyroid Protocol Passed - 8/25/2020 12:31 PM        Passed - Patient is 12 years or older        Passed - Recent (12 mo) or future (30 days) visit within the authorizing provider's specialty     Patient has had an office visit with the authorizing provider or a provider within the authorizing providers department within the previous 12 mos or has a future within next 30 days. See \"Patient Info\" tab in inbasket, or \"Choose Columns\" in Meds & Orders section of the refill encounter.              Passed - Medication is active on med list        Passed - Normal TSH on file in past 12 months     Recent Labs   Lab Test 02/05/20  1600   TSH 0.54              Passed - No active pregnancy on record     If patient is pregnant or has had a positive pregnancy test, please check TSH.          Passed - No positive pregnancy test in past 12 months     If patient is pregnant or has had a positive pregnancy test, please check TSH.             Last Written Prescription Date:  2/8/20  Last Fill Quantity: 90,  # refills: 1   Last office visit: 2/5/2020 with prescribing provider:  Dr Estefani Robles   Future Office Visit:  none          "

## 2020-09-10 ENCOUNTER — MYC MEDICAL ADVICE (OUTPATIENT)
Dept: LAB | Facility: CLINIC | Age: 35
End: 2020-09-10

## 2020-09-10 NOTE — TELEPHONE ENCOUNTER
Cleveland Matta,    This is reminder that you need to have your CBC, Ferratin, and Iron checked as soon as possible. Please call the clinic at [phone] to schedule a lab appointment.     You do have the option of going to another [facility] clinic/lab closer to your work or home to have these labs done and your provider will receive the results.    Please let us know if you have any questions or concerns.     Thank you,   JENNIFFER Ruiz  On behalf of Dr. Estefani Robles

## 2020-09-10 NOTE — TELEPHONE ENCOUNTER
1st Attempt: Sent [employer] Message to patient reminding of overdue Iron Study labs. Extending out 2 months

## 2020-11-23 ENCOUNTER — MYC MEDICAL ADVICE (OUTPATIENT)
Dept: LAB | Facility: CLINIC | Age: 35
End: 2020-11-23

## 2020-11-23 NOTE — TELEPHONE ENCOUNTER
Good Morning Sigrid,    This is reminder that you need to have your CBC, Ferratin, Iron and Iron Binding Capacity checked as soon as possible. Please call the clinic at [phone] to schedule a lab appointment.     You do have the option of going to another [facility]/lab closer to your work or home to have these labs done and your provider will receive the results.    Please let us know if you have any questions or concerns.     Thank you,   JENNIFFER Ruiz  On behalf of Dr. Estefani Robles

## 2020-11-23 NOTE — TELEPHONE ENCOUNTER
2nd Attempt: Sent [employer] message to patient reminding of overdue CBC, Ferratin, and Iron and Iron Bonding Capacity labs. Extending out 3 months. SM

## 2020-11-29 ENCOUNTER — HEALTH MAINTENANCE LETTER (OUTPATIENT)
Age: 35
End: 2020-11-29

## 2021-02-24 ENCOUNTER — TELEPHONE (OUTPATIENT)
Dept: OBGYN | Facility: CLINIC | Age: 36
End: 2021-02-24

## 2021-02-24 DIAGNOSIS — E03.9 ACQUIRED HYPOTHYROIDISM: ICD-10-CM

## 2021-02-24 RX ORDER — LEVOTHYROXINE SODIUM 88 UG/1
TABLET ORAL
Qty: 30 TABLET | Refills: 0 | Status: SHIPPED | OUTPATIENT
Start: 2021-02-24 | End: 2021-03-04

## 2021-02-24 NOTE — TELEPHONE ENCOUNTER
"Requested Prescriptions   Pending Prescriptions Disp Refills     levothyroxine (SYNTHROID/LEVOTHROID) 88 MCG tablet [Pharmacy Med Name: LEVOTHYROXINE 88 MCG TABLET] 90 tablet 1     Sig: TAKE 1 TABLET BY MOUTH EVERY DAY       Thyroid Protocol Failed - 2/24/2021 12:13 AM        Failed - Recent (12 mo) or future (30 days) visit within the authorizing provider's specialty     Patient has had an office visit with the authorizing provider or a provider within the authorizing providers department within the previous 12 mos or has a future within next 30 days. See \"Patient Info\" tab in inbasket, or \"Choose Columns\" in Meds & Orders section of the refill encounter.              Failed - Normal TSH on file in past 12 months     Recent Labs   Lab Test 02/05/20  1600   TSH 0.54              Passed - Patient is 12 years or older        Passed - Medication is active on med list        Passed - No active pregnancy on record     If patient is pregnant or has had a positive pregnancy test, please check TSH.          Passed - No positive pregnancy test in past 12 months     If patient is pregnant or has had a positive pregnancy test, please check TSH.             Last Written Prescription Date:  8/25/20  Last Fill Quantity: 90,  # refills: 1   Last office visit: 2/5/2020 with prescribing provider:  Dr Robles   Future Office Visit:  None  Medication is being filled for 1 time refill only due to:  appointment needed for further refills   Corrie Modi RN on 2/24/2021 at 7:29 PM      "

## 2021-03-04 RX ORDER — LEVOTHYROXINE SODIUM 88 UG/1
88 TABLET ORAL DAILY
Qty: 90 TABLET | Refills: 0 | Status: SHIPPED | OUTPATIENT
Start: 2021-03-04 | End: 2021-07-01

## 2021-03-04 NOTE — TELEPHONE ENCOUNTER
Last Written Prescription Date:  2/24/21  Last Fill Quantity: 30,  # refills: 0   Last office visit: 2/5/2020 with prescribing provider:  Margaret   Future Office Visit:   Next 5 appointments (look out 90 days)    Mar 29, 2021  2:15 PM  PHYSICAL with Estefani Robles MD  [facility] ([facility] ) [address] [place] [id number] [phone]           Medication is being filled for 1 time refill only due to:  Patient needs to be seen because it has been more than one year since last visit.  Appointment scheduled.  Frieda Najera, RN on 3/4/2021 at 2:32 PM

## 2021-03-04 NOTE — TELEPHONE ENCOUNTER
Patient scheduled her annual exam on 3/29/2021. Asked if she could get the 90 days refill supply. Also, please change the pharmacy to:  [address] [place] [id number]

## 2021-03-29 ENCOUNTER — OFFICE VISIT (OUTPATIENT)
Dept: OBGYN | Facility: CLINIC | Age: 36
End: 2021-03-29
Payer: COMMERCIAL

## 2021-03-29 VITALS
DIASTOLIC BLOOD PRESSURE: 64 MMHG | HEIGHT: 64 IN | SYSTOLIC BLOOD PRESSURE: 92 MMHG | BODY MASS INDEX: 26.09 KG/M2 | HEART RATE: 102 BPM | WEIGHT: 152.8 LBS

## 2021-03-29 DIAGNOSIS — Z13.21 ENCOUNTER FOR VITAMIN DEFICIENCY SCREENING: ICD-10-CM

## 2021-03-29 DIAGNOSIS — A60.04 HERPES SIMPLEX VULVOVAGINITIS: ICD-10-CM

## 2021-03-29 DIAGNOSIS — Z13.228 SCREENING FOR METABOLIC DISORDER: ICD-10-CM

## 2021-03-29 DIAGNOSIS — Z01.419 ENCOUNTER FOR GYNECOLOGICAL EXAMINATION WITHOUT ABNORMAL FINDING: Primary | ICD-10-CM

## 2021-03-29 DIAGNOSIS — Z13.0 SCREENING FOR DISORDER OF BLOOD AND BLOOD-FORMING ORGANS: ICD-10-CM

## 2021-03-29 DIAGNOSIS — Z13.6 SCREENING FOR CARDIOVASCULAR CONDITION: ICD-10-CM

## 2021-03-29 DIAGNOSIS — E80.6 HYPERBILIRUBINEMIA: ICD-10-CM

## 2021-03-29 DIAGNOSIS — Z97.5 PRESENCE OF 52 MG LEVONORGESTREL-RELEASING INTRAUTERINE DEVICE (IUD): ICD-10-CM

## 2021-03-29 DIAGNOSIS — B00.1 COLD SORE: ICD-10-CM

## 2021-03-29 DIAGNOSIS — F90.0 ATTENTION DEFICIT HYPERACTIVITY DISORDER (ADHD), PREDOMINANTLY INATTENTIVE TYPE: ICD-10-CM

## 2021-03-29 DIAGNOSIS — E03.9 ACQUIRED HYPOTHYROIDISM: ICD-10-CM

## 2021-03-29 DIAGNOSIS — Z13.1 SCREENING FOR DIABETES MELLITUS: ICD-10-CM

## 2021-03-29 LAB
ALBUMIN SERPL-MCNC: 4.1 G/DL (ref 3.4–5)
ALP SERPL-CCNC: 63 U/L (ref 40–150)
ALT SERPL W P-5'-P-CCNC: 25 U/L (ref 0–50)
ANION GAP SERPL CALCULATED.3IONS-SCNC: 4 MMOL/L (ref 3–14)
AST SERPL W P-5'-P-CCNC: 15 U/L (ref 0–45)
BILIRUB SERPL-MCNC: 1.4 MG/DL (ref 0.2–1.3)
BUN SERPL-MCNC: 17 MG/DL (ref 7–30)
CALCIUM SERPL-MCNC: 9 MG/DL (ref 8.5–10.1)
CHLORIDE SERPL-SCNC: 104 MMOL/L (ref 94–109)
CHOLEST SERPL-MCNC: 200 MG/DL
CO2 SERPL-SCNC: 27 MMOL/L (ref 20–32)
CREAT SERPL-MCNC: 0.9 MG/DL (ref 0.52–1.04)
ERYTHROCYTE [DISTWIDTH] IN BLOOD BY AUTOMATED COUNT: 11.6 % (ref 10–15)
GFR SERPL CREATININE-BSD FRML MDRD: 82 ML/MIN/{1.73_M2}
GLUCOSE SERPL-MCNC: 87 MG/DL (ref 70–99)
HBA1C MFR BLD: 5.3 % (ref 0–5.6)
HCT VFR BLD AUTO: 42.5 % (ref 35–47)
HDLC SERPL-MCNC: 48 MG/DL
HGB BLD-MCNC: 14.3 G/DL (ref 11.7–15.7)
LDLC SERPL CALC-MCNC: 136 MG/DL
MCH RBC QN AUTO: 29.2 PG (ref 26.5–33)
MCHC RBC AUTO-ENTMCNC: 33.6 G/DL (ref 31.5–36.5)
MCV RBC AUTO: 87 FL (ref 78–100)
NONHDLC SERPL-MCNC: 152 MG/DL
PLATELET # BLD AUTO: 294 10E9/L (ref 150–450)
POTASSIUM SERPL-SCNC: 3.5 MMOL/L (ref 3.4–5.3)
PROT SERPL-MCNC: 8 G/DL (ref 6.8–8.8)
RBC # BLD AUTO: 4.9 10E12/L (ref 3.8–5.2)
SODIUM SERPL-SCNC: 135 MMOL/L (ref 133–144)
TRIGL SERPL-MCNC: 82 MG/DL
TSH SERPL DL<=0.005 MIU/L-ACNC: 1.08 MU/L (ref 0.4–4)
WBC # BLD AUTO: 5.5 10E9/L (ref 4–11)

## 2021-03-29 PROCEDURE — 82607 VITAMIN B-12: CPT | Performed by: OBSTETRICS & GYNECOLOGY

## 2021-03-29 PROCEDURE — 85027 COMPLETE CBC AUTOMATED: CPT | Performed by: OBSTETRICS & GYNECOLOGY

## 2021-03-29 PROCEDURE — 99395 PREV VISIT EST AGE 18-39: CPT | Performed by: OBSTETRICS & GYNECOLOGY

## 2021-03-29 PROCEDURE — 84207 ASSAY OF VITAMIN B-6: CPT | Mod: 90 | Performed by: OBSTETRICS & GYNECOLOGY

## 2021-03-29 PROCEDURE — 99000 SPECIMEN HANDLING OFFICE-LAB: CPT | Performed by: OBSTETRICS & GYNECOLOGY

## 2021-03-29 PROCEDURE — 83036 HEMOGLOBIN GLYCOSYLATED A1C: CPT | Performed by: OBSTETRICS & GYNECOLOGY

## 2021-03-29 PROCEDURE — 80061 LIPID PANEL: CPT | Performed by: OBSTETRICS & GYNECOLOGY

## 2021-03-29 PROCEDURE — 36415 COLL VENOUS BLD VENIPUNCTURE: CPT | Performed by: OBSTETRICS & GYNECOLOGY

## 2021-03-29 PROCEDURE — 82306 VITAMIN D 25 HYDROXY: CPT | Performed by: OBSTETRICS & GYNECOLOGY

## 2021-03-29 PROCEDURE — 84443 ASSAY THYROID STIM HORMONE: CPT | Performed by: OBSTETRICS & GYNECOLOGY

## 2021-03-29 PROCEDURE — 80053 COMPREHEN METABOLIC PANEL: CPT | Performed by: OBSTETRICS & GYNECOLOGY

## 2021-03-29 PROCEDURE — 99213 OFFICE O/P EST LOW 20 MIN: CPT | Mod: 25 | Performed by: OBSTETRICS & GYNECOLOGY

## 2021-03-29 RX ORDER — VALACYCLOVIR HYDROCHLORIDE 1 G/1
2000 TABLET, FILM COATED ORAL 2 TIMES DAILY
Qty: 12 TABLET | Refills: 1 | Status: SHIPPED | OUTPATIENT
Start: 2021-03-29 | End: 2022-09-21

## 2021-03-29 RX ORDER — DEXTROAMPHETAMINE SULFATE, DEXTROAMPHETAMINE SACCHARATE, AMPHETAMINE SULFATE AND AMPHETAMINE ASPARTATE 6.25; 6.25; 6.25; 6.25 MG/1; MG/1; MG/1; MG/1
CAPSULE, EXTENDED RELEASE ORAL
COMMUNITY
Start: 2021-01-19

## 2021-03-29 ASSESSMENT — MIFFLIN-ST. JEOR: SCORE: 1368.1

## 2021-03-29 ASSESSMENT — ANXIETY QUESTIONNAIRES
6. BECOMING EASILY ANNOYED OR IRRITABLE: SEVERAL DAYS
GAD7 TOTAL SCORE: 5
5. BEING SO RESTLESS THAT IT IS HARD TO SIT STILL: NOT AT ALL
1. FEELING NERVOUS, ANXIOUS, OR ON EDGE: SEVERAL DAYS
7. FEELING AFRAID AS IF SOMETHING AWFUL MIGHT HAPPEN: SEVERAL DAYS
3. WORRYING TOO MUCH ABOUT DIFFERENT THINGS: SEVERAL DAYS
2. NOT BEING ABLE TO STOP OR CONTROL WORRYING: SEVERAL DAYS
IF YOU CHECKED OFF ANY PROBLEMS ON THIS QUESTIONNAIRE, HOW DIFFICULT HAVE THESE PROBLEMS MADE IT FOR YOU TO DO YOUR WORK, TAKE CARE OF THINGS AT HOME, OR GET ALONG WITH OTHER PEOPLE: SOMEWHAT DIFFICULT

## 2021-03-29 ASSESSMENT — PATIENT HEALTH QUESTIONNAIRE - PHQ9
SUM OF ALL RESPONSES TO PHQ QUESTIONS 1-9: 4
5. POOR APPETITE OR OVEREATING: NOT AT ALL
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
## 2021-03-30 PROBLEM — F90.0 ATTENTION DEFICIT HYPERACTIVITY DISORDER (ADHD), PREDOMINANTLY INATTENTIVE TYPE: Status: ACTIVE | Noted: 2021-03-30

## 2021-03-30 LAB
DEPRECATED CALCIDIOL+CALCIFEROL SERPL-MC: 29 UG/L (ref 20–75)
VIT B12 SERPL-MCNC: 316 PG/ML (ref 193–986)

## 2021-03-30 SDOH — ECONOMIC STABILITY: FOOD INSECURITY: WITHIN THE PAST 12 MONTHS, THE FOOD YOU BOUGHT JUST DIDN'T LAST AND YOU DIDN'T HAVE MONEY TO GET MORE.: NOT ASKED

## 2021-03-30 SDOH — ECONOMIC STABILITY: FOOD INSECURITY: WITHIN THE PAST 12 MONTHS, YOU WORRIED THAT YOUR FOOD WOULD RUN OUT BEFORE YOU GOT MONEY TO BUY MORE.: NOT ASKED

## 2021-03-30 SDOH — ECONOMIC STABILITY: TRANSPORTATION INSECURITY
IN THE PAST 12 MONTHS, HAS LACK OF TRANSPORTATION KEPT YOU FROM MEETINGS, WORK, OR FROM GETTING THINGS NEEDED FOR DAILY LIVING?: NOT ASKED

## 2021-03-30 SDOH — ECONOMIC STABILITY: INCOME INSECURITY: HOW HARD IS IT FOR YOU TO PAY FOR THE VERY BASICS LIKE FOOD, HOUSING, MEDICAL CARE, AND HEATING?: NOT ASKED

## 2021-03-30 SDOH — ECONOMIC STABILITY: TRANSPORTATION INSECURITY
IN THE PAST 12 MONTHS, HAS THE LACK OF TRANSPORTATION KEPT YOU FROM MEDICAL APPOINTMENTS OR FROM GETTING MEDICATIONS?: NOT ASKED

## 2021-03-30 ASSESSMENT — ANXIETY QUESTIONNAIRES: GAD7 TOTAL SCORE: 5

## 2021-04-02 LAB — VIT B6 SERPL-MCNC: 58.6 NMOL/L (ref 20–125)

## 2021-05-17 ENCOUNTER — MYC MEDICAL ADVICE (OUTPATIENT)
Dept: LAB | Facility: CLINIC | Age: 36
End: 2021-05-17

## 2021-05-17 NOTE — TELEPHONE ENCOUNTER
1st Attempt: Sent [employer] message to patient reminding of overdue Hepatic Panel and Bilirubin. Extending labs out 2 months. SM

## 2021-06-30 DIAGNOSIS — E03.9 ACQUIRED HYPOTHYROIDISM: ICD-10-CM

## 2021-07-01 RX ORDER — LEVOTHYROXINE SODIUM 88 UG/1
TABLET ORAL
Qty: 90 TABLET | Refills: 0 | Status: SHIPPED | OUTPATIENT
Start: 2021-07-01 | End: 2021-09-27

## 2021-07-01 NOTE — TELEPHONE ENCOUNTER
"Requested Prescriptions   Pending Prescriptions Disp Refills     levothyroxine (SYNTHROID/LEVOTHROID) 88 MCG tablet [Pharmacy Med Name: LEVOTHYROXINE 0.088MG (88MCG) TAB] 90 tablet 0     Sig: TAKE 1 TABLET(88 MCG) BY MOUTH DAILY       Thyroid Protocol Passed - 6/30/2021 10:36 PM        Passed - Patient is 12 years or older        Passed - Recent (12 mo) or future (30 days) visit within the authorizing provider's specialty     Patient has had an office visit with the authorizing provider or a provider within the authorizing providers department within the previous 12 mos or has a future within next 30 days. See \"Patient Info\" tab in inbasket, or \"Choose Columns\" in Meds & Orders section of the refill encounter.              Passed - Medication is active on med list        Passed - Normal TSH on file in past 12 months     Recent Labs   Lab Test 03/29/21  1522   TSH 1.08              Passed - No active pregnancy on record     If patient is pregnant or has had a positive pregnancy test, please check TSH.          Passed - No positive pregnancy test in past 12 months     If patient is pregnant or has had a positive pregnancy test, please check TSH.             Last Written Prescription Date:  3/4/21  Last Fill Quantity: 90,  # refills: 0   Last office visit: 3/29/2021 with prescribing provider:  Margaret   Future Office Visit: none found          "

## 2021-07-01 NOTE — TELEPHONE ENCOUNTER
Prescription approved per [facility] Refill Protocol.  Frieda Najera RN on 7/1/2021 at 11:40 AM

## 2021-08-18 ENCOUNTER — LAB (OUTPATIENT)
Dept: LAB | Facility: CLINIC | Age: 36
End: 2021-08-18
Payer: COMMERCIAL

## 2021-08-18 DIAGNOSIS — E80.6 HYPERBILIRUBINEMIA: ICD-10-CM

## 2021-08-18 LAB
ALBUMIN SERPL-MCNC: 3.9 G/DL (ref 3.4–5)
ALP SERPL-CCNC: 46 U/L (ref 40–150)
ALT SERPL W P-5'-P-CCNC: 28 U/L (ref 0–50)
AST SERPL W P-5'-P-CCNC: 21 U/L (ref 0–45)
BILIRUB DIRECT SERPL-MCNC: 0.2 MG/DL (ref 0–0.2)
BILIRUB SERPL-MCNC: 0.9 MG/DL (ref 0.2–1.3)
PROT SERPL-MCNC: 7.4 G/DL (ref 6.8–8.8)

## 2021-08-18 PROCEDURE — 36415 COLL VENOUS BLD VENIPUNCTURE: CPT

## 2021-08-18 PROCEDURE — 80076 HEPATIC FUNCTION PANEL: CPT

## 2021-08-18 NOTE — RESULT ENCOUNTER NOTE
Sigrid,    Your bilirubin is back to normal again, as are all of your liver enzymes. Not sure why it bumped up like that, maybe even just a little dehydration, but its normal now so not of concern.    AJ

## 2021-09-25 ENCOUNTER — HEALTH MAINTENANCE LETTER (OUTPATIENT)
Age: 36
End: 2021-09-25

## 2021-09-26 DIAGNOSIS — E03.9 ACQUIRED HYPOTHYROIDISM: ICD-10-CM

## 2021-09-27 RX ORDER — LEVOTHYROXINE SODIUM 88 UG/1
TABLET ORAL
Qty: 90 TABLET | Refills: 0 | Status: SHIPPED | OUTPATIENT
Start: 2021-09-27 | End: 2021-10-01

## 2021-09-27 NOTE — TELEPHONE ENCOUNTER
"Requested Prescriptions   Pending Prescriptions Disp Refills     levothyroxine (SYNTHROID/LEVOTHROID) 88 MCG tablet [Pharmacy Med Name: LEVOTHYROXINE 0.088MG (88MCG) TAB] 90 tablet 0     Sig: TAKE 1 TABLET(88 MCG) BY MOUTH DAILY       Thyroid Protocol Passed - 9/26/2021  1:27 PM        Passed - Patient is 12 years or older        Passed - Recent (12 mo) or future (30 days) visit within the authorizing provider's specialty     Patient has had an office visit with the authorizing provider or a provider within the authorizing providers department within the previous 12 mos or has a future within next 30 days. See \"Patient Info\" tab in inbasket, or \"Choose Columns\" in Meds & Orders section of the refill encounter.              Passed - Medication is active on med list        Passed - Normal TSH on file in past 12 months     Recent Labs   Lab Test 03/29/21  1522   TSH 1.08              Passed - No active pregnancy on record     If patient is pregnant or has had a positive pregnancy test, please check TSH.          Passed - No positive pregnancy test in past 12 months     If patient is pregnant or has had a positive pregnancy test, please check TSH.             Prescription approved per [facility] Refill Protocol.  Corrie Modi RN on 9/27/2021 at 11:02 AM    "

## 2021-10-01 DIAGNOSIS — E03.9 ACQUIRED HYPOTHYROIDISM: ICD-10-CM

## 2021-10-01 RX ORDER — LEVOTHYROXINE SODIUM 88 UG/1
TABLET ORAL
Qty: 90 TABLET | Refills: 1 | Status: SHIPPED | OUTPATIENT
Start: 2021-10-01 | End: 2022-04-11

## 2021-10-01 NOTE — TELEPHONE ENCOUNTER
"Requested Prescriptions   Pending Prescriptions Disp Refills     levothyroxine (SYNTHROID/LEVOTHROID) 88 MCG tablet [Pharmacy Med Name: LEVOTHYROXINE 0.088MG (88MCG) TAB] 90 tablet 0     Sig: TAKE 1 TABLET(88 MCG) BY MOUTH DAILY       Thyroid Protocol Passed - 10/1/2021  8:02 AM        Passed - Patient is 12 years or older        Passed - Recent (12 mo) or future (30 days) visit within the authorizing provider's specialty     Patient has had an office visit with the authorizing provider or a provider within the authorizing providers department within the previous 12 mos or has a future within next 30 days. See \"Patient Info\" tab in inbasket, or \"Choose Columns\" in Meds & Orders section of the refill encounter.              Passed - Medication is active on med list        Passed - Normal TSH on file in past 12 months     Recent Labs   Lab Test 03/29/21  1522   TSH 1.08              Passed - No active pregnancy on record     If patient is pregnant or has had a positive pregnancy test, please check TSH.          Passed - No positive pregnancy test in past 12 months     If patient is pregnant or has had a positive pregnancy test, please check TSH.             Last Written Prescription Date:  9/27/21  Last Fill Quantity: 90,  # refills: 0   Last office visit: 3/29/2021 with prescribing provider:  Margaret   Future Office Visit:      Prescription approved per [facility] Refill Protocol.        Frieda Najera RN on 10/1/2021 at 9:38 AM                  "

## 2021-11-20 ENCOUNTER — HEALTH MAINTENANCE LETTER (OUTPATIENT)
Age: 36
End: 2021-11-20

## 2022-04-09 DIAGNOSIS — E03.9 ACQUIRED HYPOTHYROIDISM: ICD-10-CM

## 2022-04-11 RX ORDER — LEVOTHYROXINE SODIUM 88 UG/1
TABLET ORAL
Qty: 90 TABLET | Refills: 0 | Status: SHIPPED | OUTPATIENT
Start: 2022-04-11 | End: 2022-10-16

## 2022-04-11 NOTE — TELEPHONE ENCOUNTER
"Requested Prescriptions   Pending Prescriptions Disp Refills     levothyroxine (SYNTHROID/LEVOTHROID) 88 MCG tablet [Pharmacy Med Name: LEVOTHYROXINE 0.088MG (88MCG) TAB] 90 tablet 1     Sig: TAKE 1 TABLET(88 MCG) BY MOUTH DAILY       Thyroid Protocol Failed - 4/9/2022  8:02 AM        Failed - Recent (12 mo) or future (30 days) visit within the authorizing provider's specialty     Patient has had an office visit with the authorizing provider or a provider within the authorizing providers department within the previous 12 mos or has a future within next 30 days. See \"Patient Info\" tab in inbasket, or \"Choose Columns\" in Meds & Orders section of the refill encounter.              Failed - Normal TSH on file in past 12 months     Recent Labs   Lab Test 03/29/21  1522   TSH 1.08              Passed - Patient is 12 years or older        Passed - Medication is active on med list        Passed - No active pregnancy on record     If patient is pregnant or has had a positive pregnancy test, please check TSH.          Passed - No positive pregnancy test in past 12 months     If patient is pregnant or has had a positive pregnancy test, please check TSH.             Last Written Prescription Date:  10/1/21  Last Fill Quantity: 90,  # refills: 1   Last office visit: 3/29/2021 with prescribing provider:  DR Robles   Future Office Visit:      Medication is being filled for 1 time refill only due to:  Patient needs to be seen because it has been more than one year since last visit.  Linn Stevens RN on 4/11/2022 at 12:41 PM        "

## 2022-05-01 ENCOUNTER — HEALTH MAINTENANCE LETTER (OUTPATIENT)
Age: 37
End: 2022-05-01

## 2022-09-15 NOTE — PROGRESS NOTES
Sigrid is a 37 year old  female who presents for annual exam.     Besides routine health maintenance, she has no other health concerns today .    HPI:  The patient does not have a PCP        Patient is being seen today for an annual physical.     Doing well with her mirena IUD that has been in for 4 yrs now. Will get some occasional spotting here or there just with wiping and then gone quickly. No other cramping or pain or issues with it.     Labs done 3/21 but wasn't fasting and LDL slightly high at 136. Also bili was slightly elevated at 1.4 but no si/sx of liver issues or jaundice.     Patient has hypothyroidism and is currently on 88mcg levox. So needs that refilled once TSH is back     Patient would like STD testing today as well. Has been sexually active with one person since last here so wants to just be sure but no specific concerns.    Wondering if there's anything she needs to be thinking about for her overall long term health given her heter Prothrombin 2 and hetero mthfr.    Patient had 2 of 3 gardasil shots several years ago at [facility]. Not in [facility] however. Never completed the series and willing to do that today  Is due for a flu shot and biboost and open to doing the latter but declines flu    Overall no health concerns today, and is doing really well.     Richar is doing well, he just turned 2 y/o in July. Unsure if she would like to send him to school next year, but is thinking about holding him back with a July birthday.       GYNECOLOGIC HISTORY:    No LMP recorded. (Menstrual status: IUD).    Regular menses? no  Her current contraception method is: IUD.  She  reports that she has never smoked. She has never used smokeless tobacco.    Patient is not sexually active.  STD testing offered?  Declined  Last PHQ-9 score on record =   PHQ-9 SCORE 2022   PHQ-9 Total Score 0     Last GAD7 score on record =   MARIO-7 SCORE 2022   Total Score 0     Alcohol Score = 2    HEALTH  MAINTENANCE:  Cholesterol:   Cholesterol   Date Value Ref Range Status   2022 214 (H) <200 mg/dL Final   2021 200 (H) <200 mg/dL Final     Comment:     Desirable:       <200 mg/dl   2016 213 (H) <200 mg/dL Final     Comment:     Desirable:       <200 mg/dl   Last Mammo: Not applicable, Result: Not applicable, Next Mammo: Today   Pap:   Lab Results   Component Value Date    PAP NIL 2020    PAP Negative 2013    PAP LSIL 2012 WNL HPV (-)neg  Colonoscopy:  NA, Result: Not applicable, Next Colonoscopy: NA years.  Dexa:  NA    Health maintenance updated:  yes    HISTORY:  OB History    Para Term  AB Living   1 1 1 0 0 1   SAB IAB Ectopic Multiple Live Births   0 0 0 0 1      # Outcome Date GA Lbr Arnie/2nd Weight Sex Delivery Anes PTL Lv   1 Term 18 39w0d 05:30 / 02:03 3 kg (6 lb 9.8 oz) M Vag-Vacuum EPI N YOSHI      Birth Comments: PROM for 25 hours. ROM at 2145, pit started at 0600. developed chorio about 1hour before delivery. did vacuum for maternal fever/exhaustion/tachycardia/near syncope with pushing and baby to NICU but stable. 2nd degree lac veering to left. ebl 500cc       Complications: Prolonged PROM (>18 hours)      Name: Richar      Apgar1: 9  Apgar5: 9       Patient Active Problem List   Diagnosis     Family history of heart attack     Herpes simplex vulvovaginitis     Cold sore     Prothrombin gene mutation (H)     MTHFR mutation     Acquired hypothyroidism     Presence of of 52 mg levonorgestrel-releasing intrauterine device (IUD)     High serum ferritin     Low grade squamous intraepithelial lesion (LGSIL) on cervical Pap smear     Cervical high risk HPV (human papillomavirus) test positive     Attention deficit hyperactivity disorder (ADHD), predominantly inattentive type     Past Surgical History:   Procedure Laterality Date     CYSTOSCOPY      cystoscopy, normal      Social History     Tobacco Use     Smoking status: Never      "Smokeless tobacco: Never   Substance Use Topics     Alcohol use: Yes     Alcohol/week: 0.0 standard drinks      Problem (# of Occurrences) Relation (Name,Age of Onset)    Hypertension (1) Father (60)    Cerebrovascular Disease (1) Paternal Grandmother    Breast Cancer (1) Paternal Aunt: mid 50s    Hyperlipidemia (1) Father    Coronary Artery Disease (1) Father (60): summer 2014    No Known Problems (8) Mother, Sister, Brother, Maternal Grandmother, Maternal Grandfather, Paternal Grandfather, Other, Other            Current Outpatient Medications   Medication Sig     ADDERALL XR 25 MG 24 hr capsule      levonorgestrel (MIRENA) 20 MCG/24HR IUD 1 each (20 mcg) by Intrauterine route continuous Lot # [id number]  Exp: April 2021  NDC: 34201-730-97     levothyroxine (SYNTHROID/LEVOTHROID) 88 MCG tablet Take 1 tablet (88 mcg) by mouth daily     valACYclovir (VALTREX) 1000 mg tablet Take 2 tablets (2,000 mg) by mouth 2 times daily     No current facility-administered medications for this visit.     No Known Allergies    Past medical, surgical, social and family histories were reviewed and updated in EPIC.    ROS:   12 point review of systems negative other than symptoms noted below or in the HPI.  No urinary frequency or dysuria, bladder or kidney problems    EXAM:  /76   Ht 1.626 m (5' 4\")   Wt 68.5 kg (151 lb)   Breastfeeding No   BMI 25.92 kg/m     BMI: Body mass index is 25.92 kg/m .    PHYSICAL EXAM:  Constitutional:   Appearance: Well nourished, well developed, alert, in no acute distress  Neck:  Lymph Nodes:  No lymphadenopathy present    Thyroid:  Gland size normal, nontender, no nodules or masses present  on palpation  Chest:  Respiratory Effort:  Breathing unlabored, CTA bilaterally.   Cardiovascular:    Heart: Auscultation:  Regular rate, normal rhythm, no murmurs present  Breasts: Inspection of Breasts:  No lymphadenopathy present., Palpation of Breasts and Axillae:  No masses present on palpation, no " breast tenderness., Axillary Lymph Nodes:  No lymphadenopathy present. and No nodularity, asymmetry or nipple discharge bilaterally.  Gastrointestinal:   Abdominal Examination:  Abdomen nontender to palpation, tone normal without rigidity or guarding, no masses present, umbilicus without lesions   Liver and Spleen:  No hepatomegaly present, liver nontender to palpation    Hernias:  No hernias present  Lymphatic: Lymph Nodes:  No other lymphadenopathy present  Skin:  General Inspection:  No rashes present, no lesions present, no areas of  discoloration  Neurologic:    Mental Status:  Oriented X3.  Normal strength and tone, sensory exam                grossly normal, mentation intact and speech normal.    Psychiatric:   Mentation appears normal and affect normal/bright.         Pelvic Exam:  External Genitalia:     Normal appearance for age, no discharge present, no tenderness present, no inflammatory lesions present, color normal  Vagina:     Normal vaginal vault without central or paravaginal defects, no discharge present, no inflammatory lesions present, no masses present  Bladder:     Nontender to palpation  Urethra:   Urethral Body:  Urethra palpation normal, urethra structural support normal   Urethral Meatus:  No erythema or lesions present  Cervix:     Appearance healthy, no lesions present, nontender to palpation, no bleeding present, IUD STRINGS NOT PRESENT  Uterus:     Uterus: firm, normal sized and nontender, anteflexed in position.   Adnexa:     No adnexal tenderness present, no adnexal masses present  Perineum:     Perineum within normal limits, no evidence of trauma, no rashes or skin lesions present  Anus:     Anus within normal limits, no hemorrhoids present  Inguinal Lymph Nodes:     No lymphadenopathy present  Pubic Hair:     Normal pubic hair distribution for age  Genitalia and Groin:     No rashes present, no lesions present, no areas of discoloration, no masses present      COUNSELING:   Reviewed  preventive health counseling, as reflected in patient instructions    BMI: Body mass index is 25.92 kg/m .  Weight management plan: Discussed healthy diet and exercise guidelines    ASSESSMENT:  37 year old female with satisfactory annual exam.    ICD-10-CM    1. Encounter for gynecological examination without abnormal finding  Z01.419       2. Cold sore  B00.1 valACYclovir (VALTREX) 1000 mg tablet      3. Acquired hypothyroidism  E03.9 TSH     TSH     levothyroxine (SYNTHROID/LEVOTHROID) 88 MCG tablet      4. Herpes simplex vulvovaginitis  A60.04       5. Presence of 52 mg levonorgestrel-releasing intrauterine device (IUD)  Z97.5       6. Vitamin D deficiency  E55.9       7. Hyperbilirubinemia  E80.6       8. Encounter for lipid screening for cardiovascular disease  Z13.220 Lipid panel reflex to direct LDL Fasting    Z13.6 Lipid panel reflex to direct LDL Fasting      9. Screening for metabolic disorder  Z13.228 Comprehensive metabolic panel     Comprehensive metabolic panel      10. Screening for diabetes mellitus  Z13.1 Hemoglobin A1c     Hemoglobin A1c      11. Encounter for vitamin deficiency screening  Z13.21 Vitamin D Deficiency     Vitamin D Deficiency      12. Screen for STD (sexually transmitted disease)  Z11.3 NEISSERIA GONORRHOEA PCR     Treponema Abs w Reflex to RPR and Titer     Hepatitis C antibody     NEISSERIA GONORRHOEA PCR     Treponema Abs w Reflex to RPR and Titer     Hepatitis C antibody      13. Screening for chlamydial disease  Z11.8 CHLAMYDIA TRACHOMATIS PCR     CHLAMYDIA TRACHOMATIS PCR      14. Screening for HIV (human immunodeficiency virus)  Z11.4 HIV Antigen Antibody Combo     HIV Antigen Antibody Combo      15. Screening for disorder of blood and blood-forming organs  Z13.0       16. COVID-19 vaccine administered  Z23 COVID-19,PF,PFIZER BOOSTER BIVALENT     VACCINE ADMINISTRATION, INITIAL      17. Need for HPV vaccination  Z23 HPV, IM (9 - 26 YRS) - Gardasil 9     VACCINE  ADMINISTRATION [facility], EACH ADDITIONAL          PLAN:  Pap/HPV UTD for 2 more years.   Mammo to start at 40 y/o.   Fasting labs completed today, check thyroid, bilirubin, lipids now that fasting as well as full STD Testing    Biboost and the final of her gardasil series completed today, after discussion of pros/cons/risks/se  Did encourage to do a flu shot as well, but she declined     Patient does have a h.o pos cold sores and occasional genital herpes, though latter not common.  Likes to have valtrex on hand for outbreaks so refills sent  for the year.     Reviewed her mirena and FDA approval now for 8 years. Discussed what she can expect in terms of bleeding patterns and other perimenopausal sx in the coming few years.  IUD strings not seen today so when she is due for replacement may have to consider U/S guidance for removal     Reviewed that MTHFR in and of itself is not linked to VTE or CV disease in any significant way other than homocysteine levels. The prothrombin 2 heterozygous status may confer slightly increased risk but had no problems during pregnancy  Recommend healthy diet, exercise, avoiding prolonged sitting and generally sedentary lifestyle and routine surveillance of fasting labs for other cv dz/dm/htn/lipid mgmt      Estefani Robles MD

## 2022-09-21 ENCOUNTER — OFFICE VISIT (OUTPATIENT)
Dept: OBGYN | Facility: CLINIC | Age: 37
End: 2022-09-21
Payer: COMMERCIAL

## 2022-09-21 VITALS
BODY MASS INDEX: 25.78 KG/M2 | WEIGHT: 151 LBS | HEIGHT: 64 IN | DIASTOLIC BLOOD PRESSURE: 76 MMHG | SYSTOLIC BLOOD PRESSURE: 120 MMHG

## 2022-09-21 DIAGNOSIS — Z13.0 SCREENING FOR DISORDER OF BLOOD AND BLOOD-FORMING ORGANS: ICD-10-CM

## 2022-09-21 DIAGNOSIS — Z23 NEED FOR HPV VACCINATION: ICD-10-CM

## 2022-09-21 DIAGNOSIS — Z11.3 SCREEN FOR STD (SEXUALLY TRANSMITTED DISEASE): ICD-10-CM

## 2022-09-21 DIAGNOSIS — Z13.1 SCREENING FOR DIABETES MELLITUS: ICD-10-CM

## 2022-09-21 DIAGNOSIS — Z97.5 PRESENCE OF 52 MG LEVONORGESTREL-RELEASING INTRAUTERINE DEVICE (IUD): ICD-10-CM

## 2022-09-21 DIAGNOSIS — E80.6 HYPERBILIRUBINEMIA: ICD-10-CM

## 2022-09-21 DIAGNOSIS — B00.1 COLD SORE: ICD-10-CM

## 2022-09-21 DIAGNOSIS — Z11.8 SCREENING FOR CHLAMYDIAL DISEASE: ICD-10-CM

## 2022-09-21 DIAGNOSIS — Z13.6 ENCOUNTER FOR LIPID SCREENING FOR CARDIOVASCULAR DISEASE: ICD-10-CM

## 2022-09-21 DIAGNOSIS — Z11.4 SCREENING FOR HIV (HUMAN IMMUNODEFICIENCY VIRUS): ICD-10-CM

## 2022-09-21 DIAGNOSIS — Z01.419 ENCOUNTER FOR GYNECOLOGICAL EXAMINATION WITHOUT ABNORMAL FINDING: Primary | ICD-10-CM

## 2022-09-21 DIAGNOSIS — Z13.21 ENCOUNTER FOR VITAMIN DEFICIENCY SCREENING: ICD-10-CM

## 2022-09-21 DIAGNOSIS — Z23 COVID-19 VACCINE ADMINISTERED: ICD-10-CM

## 2022-09-21 DIAGNOSIS — A60.04 HERPES SIMPLEX VULVOVAGINITIS: ICD-10-CM

## 2022-09-21 DIAGNOSIS — E55.9 VITAMIN D DEFICIENCY: ICD-10-CM

## 2022-09-21 DIAGNOSIS — Z13.228 SCREENING FOR METABOLIC DISORDER: ICD-10-CM

## 2022-09-21 DIAGNOSIS — E03.9 ACQUIRED HYPOTHYROIDISM: ICD-10-CM

## 2022-09-21 DIAGNOSIS — Z13.220 ENCOUNTER FOR LIPID SCREENING FOR CARDIOVASCULAR DISEASE: ICD-10-CM

## 2022-09-21 LAB — HBA1C MFR BLD: 5.2 % (ref 0–5.6)

## 2022-09-21 PROCEDURE — 82306 VITAMIN D 25 HYDROXY: CPT | Performed by: OBSTETRICS & GYNECOLOGY

## 2022-09-21 PROCEDURE — 90651 9VHPV VACCINE 2/3 DOSE IM: CPT | Performed by: OBSTETRICS & GYNECOLOGY

## 2022-09-21 PROCEDURE — 90471 IMMUNIZATION ADMIN: CPT | Performed by: OBSTETRICS & GYNECOLOGY

## 2022-09-21 PROCEDURE — 91312 COVID-19,PF,PFIZER BOOSTER BIVALENT: CPT | Performed by: OBSTETRICS & GYNECOLOGY

## 2022-09-21 PROCEDURE — 87491 CHLMYD TRACH DNA AMP PROBE: CPT | Performed by: OBSTETRICS & GYNECOLOGY

## 2022-09-21 PROCEDURE — 86780 TREPONEMA PALLIDUM: CPT | Performed by: OBSTETRICS & GYNECOLOGY

## 2022-09-21 PROCEDURE — 83036 HEMOGLOBIN GLYCOSYLATED A1C: CPT | Performed by: OBSTETRICS & GYNECOLOGY

## 2022-09-21 PROCEDURE — 80053 COMPREHEN METABOLIC PANEL: CPT | Performed by: OBSTETRICS & GYNECOLOGY

## 2022-09-21 PROCEDURE — 87389 HIV-1 AG W/HIV-1&-2 AB AG IA: CPT | Performed by: OBSTETRICS & GYNECOLOGY

## 2022-09-21 PROCEDURE — 84443 ASSAY THYROID STIM HORMONE: CPT | Performed by: OBSTETRICS & GYNECOLOGY

## 2022-09-21 PROCEDURE — 86803 HEPATITIS C AB TEST: CPT | Performed by: OBSTETRICS & GYNECOLOGY

## 2022-09-21 PROCEDURE — 0124A COVID-19,PF,PFIZER BOOSTER BIVALENT: CPT | Performed by: OBSTETRICS & GYNECOLOGY

## 2022-09-21 PROCEDURE — 99395 PREV VISIT EST AGE 18-39: CPT | Mod: 25 | Performed by: OBSTETRICS & GYNECOLOGY

## 2022-09-21 PROCEDURE — 36415 COLL VENOUS BLD VENIPUNCTURE: CPT | Performed by: OBSTETRICS & GYNECOLOGY

## 2022-09-21 PROCEDURE — 80061 LIPID PANEL: CPT | Performed by: OBSTETRICS & GYNECOLOGY

## 2022-09-21 PROCEDURE — 87591 N.GONORRHOEAE DNA AMP PROB: CPT | Performed by: OBSTETRICS & GYNECOLOGY

## 2022-09-21 RX ORDER — LEVOTHYROXINE SODIUM 88 UG/1
88 TABLET ORAL DAILY
Qty: 90 TABLET | Refills: 3 | Status: CANCELLED | OUTPATIENT
Start: 2022-09-21

## 2022-09-21 RX ORDER — VALACYCLOVIR HYDROCHLORIDE 1 G/1
2000 TABLET, FILM COATED ORAL 2 TIMES DAILY
Qty: 12 TABLET | Refills: 1 | Status: SHIPPED | OUTPATIENT
Start: 2022-09-21

## 2022-09-22 LAB
ALBUMIN SERPL-MCNC: 4 G/DL (ref 3.4–5)
ALP SERPL-CCNC: 54 U/L (ref 40–150)
ALT SERPL W P-5'-P-CCNC: 43 U/L (ref 0–50)
ANION GAP SERPL CALCULATED.3IONS-SCNC: 8 MMOL/L (ref 3–14)
AST SERPL W P-5'-P-CCNC: 15 U/L (ref 0–45)
BILIRUB SERPL-MCNC: 0.9 MG/DL (ref 0.2–1.3)
BUN SERPL-MCNC: 15 MG/DL (ref 7–30)
C TRACH DNA SPEC QL NAA+PROBE: NEGATIVE
CALCIUM SERPL-MCNC: 9.3 MG/DL (ref 8.5–10.1)
CHLORIDE BLD-SCNC: 104 MMOL/L (ref 94–109)
CHOLEST SERPL-MCNC: 214 MG/DL
CO2 SERPL-SCNC: 24 MMOL/L (ref 20–32)
CREAT SERPL-MCNC: 0.8 MG/DL (ref 0.52–1.04)
DEPRECATED CALCIDIOL+CALCIFEROL SERPL-MC: 35 UG/L (ref 20–75)
FASTING STATUS PATIENT QL REPORTED: YES
GFR SERPL CREATININE-BSD FRML MDRD: >90 ML/MIN/1.73M2
GLUCOSE BLD-MCNC: 90 MG/DL (ref 70–99)
HCV AB SERPL QL IA: NONREACTIVE
HDLC SERPL-MCNC: 48 MG/DL
HIV 1+2 AB+HIV1 P24 AG SERPL QL IA: NONREACTIVE
LDLC SERPL CALC-MCNC: 143 MG/DL
N GONORRHOEA DNA SPEC QL NAA+PROBE: NEGATIVE
NONHDLC SERPL-MCNC: 166 MG/DL
POTASSIUM BLD-SCNC: 4.6 MMOL/L (ref 3.4–5.3)
PROT SERPL-MCNC: 7.7 G/DL (ref 6.8–8.8)
SODIUM SERPL-SCNC: 136 MMOL/L (ref 133–144)
T PALLIDUM AB SER QL: NONREACTIVE
TRIGL SERPL-MCNC: 114 MG/DL
TSH SERPL DL<=0.005 MIU/L-ACNC: 0.63 MU/L (ref 0.4–4)

## 2022-09-22 ASSESSMENT — ANXIETY QUESTIONNAIRES
GAD7 TOTAL SCORE: 0
5. BEING SO RESTLESS THAT IT IS HARD TO SIT STILL: NOT AT ALL
7. FEELING AFRAID AS IF SOMETHING AWFUL MIGHT HAPPEN: NOT AT ALL
6. BECOMING EASILY ANNOYED OR IRRITABLE: NOT AT ALL
1. FEELING NERVOUS, ANXIOUS, OR ON EDGE: NOT AT ALL
3. WORRYING TOO MUCH ABOUT DIFFERENT THINGS: NOT AT ALL
2. NOT BEING ABLE TO STOP OR CONTROL WORRYING: NOT AT ALL
IF YOU CHECKED OFF ANY PROBLEMS ON THIS QUESTIONNAIRE, HOW DIFFICULT HAVE THESE PROBLEMS MADE IT FOR YOU TO DO YOUR WORK, TAKE CARE OF THINGS AT HOME, OR GET ALONG WITH OTHER PEOPLE: NOT DIFFICULT AT ALL
GAD7 TOTAL SCORE: 0

## 2022-09-22 ASSESSMENT — PATIENT HEALTH QUESTIONNAIRE - PHQ9
5. POOR APPETITE OR OVEREATING: NOT AT ALL
SUM OF ALL RESPONSES TO PHQ QUESTIONS 1-9: 0

## 2022-09-23 NOTE — RESULT ENCOUNTER NOTE
"Sigrid,    Your cholesterol is elevated. The LDL, or bad cholesterol, should be under 130 and not 100 as shown (as long as you don't have any other heart disease risk factors like hypertension or diabetes), and your's is 143 with an HDL of 48 which is a bit low. The HDL is just a genetic thing you can't control so trying to make sure the HDL stays under 130 is the most important thing.  Cutting carbs/sweets/sugars/processed and packaged foods is the biggest impact on the LDL, more so than \"fatty foods\" like eggs/cheese/meat.  We should just repeat a fasting cholesterol in a year to keep tabs on it. Nothing that warrants medication by any means.    All of your STD testing Is normal/negative    Your other labs include your:    Metabolic panel which shows your blood sugar, electrolytes, kidney function, and liver enzymes, which are all normal.    Your hgb A1C, which is a test of how your overall blood sugars have been running over a 3 month period of time, is 5.2% which is normal.    Your thyroid is normal.    Your vitamin D level is 35 with an ideal range of 40-50. It will typically decrease throughout the winter so if you're not already taking 2000 international unit(s)/d of over the counter Vitamin D3 you should plan to do that every day to maintain levels.        Estefani Robles MD    "

## 2022-10-16 RX ORDER — LEVOTHYROXINE SODIUM 88 UG/1
88 TABLET ORAL DAILY
Qty: 90 TABLET | Refills: 3 | Status: SHIPPED | OUTPATIENT
Start: 2022-10-16 | End: 2023-09-28

## 2022-12-26 ENCOUNTER — HEALTH MAINTENANCE LETTER (OUTPATIENT)
Age: 37
End: 2022-12-26

## 2023-04-18 ENCOUNTER — TELEPHONE (OUTPATIENT)
Dept: OBGYN | Facility: CLINIC | Age: 38
End: 2023-04-18
Payer: COMMERCIAL

## 2023-09-26 NOTE — PROGRESS NOTES
Sigrid is a 38 year old  female who presents for annual exam.     Besides routine health maintenance, she has no other health concerns today . Maybe slight abdmoinal pain that comes and goes     HPI:  The patient's PCP is Physician No Ref-Primary.  Fasting labs     Patient is being seen today for an annual physical.      Doing well with her mirena IUD that has been in for 5 yrs now. Confirmed cyclical pink d.c w/a few tiny blood clots on occasion. During the past month, she experienced a noticeable increase in clear ovulatory type d.c.that lasted for quite a while. Gets ovulation d/c every month but this was copious and much longer lasting than normal. it has since stopped.     Around this same time she had onset of lower right pelvic pain. Dull ache and then one day much more pronounced when she was walking with Richar down the street that stopped her in her tracks. Somewhat of a pulsating worsening but constant dull pain. Lasted about 1 day and then had a very strange BM that was covered in white stuff but was a normal formed o/w normal color stool. Had a pic on her phone for me to review. This was on . After that BM her pain from the RLQ seemed to worsen and wrap around the side of her and in to her low back on the right. This then also lasted about a day/day and a half and then resolved. Was just about to call if it didn't, o/w was going to wait for this visit to discuss, but the pain did stop.  Has not had anything like this before. Has definitely tendency to constipation and was overall constipated around that time as well but this felt like a very different pain than her typical GI which is short and sharp and shooting and everywhere.    Patient has hypothyroidism and is currently on 88mcg levox. So needs that refilled once TSH is back.   Not using Valtrex often at this time; therefore, does not need a refill. Has had it in the past for cold sores which were more common for her though has HSV-2 pos  as well    Her father has h.o elevated bilirubins like she does. Has had intermittent elevations but then normalize. Has never had obvious jaundice before though. Has not had formal GI consult for this but presumed gilbert's. Also has hetero MTHFR which has always concerned her and her mom has this as well. Told that she may process anesthesia faster b/c of it b/c recently woke up during a procedure.  Also hetero prothrombin 2 but has not had any personal VTEs     Was actually fasting for labs last year when her LDL was found high. is fasting again today to recheck  Has actually lost 10# since here last b/c doing I.F. however she thinks she'd lost more at one point and has now regained some    In 2012 she had an abnl pap, LSIL, though no dysplasia level found. Then had nil/ pap 2013 and then no f/up utnil a pap was done 2/20 and was nil/neg. Wondering  so  about pap schedule for her bc would like to do one.  Is not dating now and not s.a since her x.  Declines STD testing.      Richar is doing well, he just turned 3 y/o in July. Richar will be going to  next year.     GYNECOLOGIC HISTORY:    No LMP recorded. (Menstrual status: IUD).    Regular menses? Last 4-5 months visible light bleeding      Her current contraception method is: IUD.  She  reports that she has never smoked. She has never used smokeless tobacco.    Patient is not sexually active.  STD testing offered?  Declined  Last PHQ-9 score on record =       9/27/2023    10:43 AM   PHQ-9 SCORE   PHQ-9 Total Score 1     Last GAD7 score on record =       9/27/2023    10:43 AM   MARIO-7 SCORE   Total Score 3     Alcohol Score = 1    HEALTH MAINTENANCE:  Cholesterol:   Recent Labs   Lab Test 09/21/22  1136 03/29/21  1522   CHOL 214* 200*   HDL 48* 48*   * 136*   TRIG 114 82     Last Mammo: Not applicable, Result: Not applicable, Next Mammo: Due at age 39   Pap:  Lab Results   Component Value Date    PAP NIL 02/05/2020    PAP Negative 03/01/2013     PAP LSIL 2012     Colonoscopy:  NA, Result: Not applicable, Next Colonoscopy: 44 years.  Dexa:  NA    Health maintenance updated:  Yes    HISTORY:  OB History    Para Term  AB Living   1 1 1 0 0 1   SAB IAB Ectopic Multiple Live Births   0 0 0 0 1      # Outcome Date GA Lbr Arnie/2nd Weight Sex Delivery Anes PTL Lv   1 Term 18 39w0d 05:30 / 02:03 3 kg (6 lb 9.8 oz) M Vag-Vacuum EPI N YOSHI      Birth Comments: PROM for 25 hours. ROM at 2145, pit started at 0600. developed chorio about 1hour before delivery. did vacuum for maternal fever/exhaustion/tachycardia/near syncope with pushing and baby to NICU but stable. 2nd degree lac veering to left. ebl 500cc       Complications: Prolonged PROM (>18 hours)      Name: Richar      Apgar1: 9  Apgar5: 9       Patient Active Problem List   Diagnosis     Family history of heart attack     Herpes simplex vulvovaginitis     Cold sore     Prothrombin gene mutation (H)     MTHFR mutation     Acquired hypothyroidism     Presence of of 52 mg levonorgestrel-releasing intrauterine device (IUD)     High serum ferritin     Low grade squamous intraepithelial lesion (LGSIL) on cervical Pap smear     Cervical high risk HPV (human papillomavirus) test positive     Attention deficit hyperactivity disorder (ADHD), predominantly inattentive type     Hypercholesteremia     Elevated bilirubin     Past Surgical History:   Procedure Laterality Date     CYSTOSCOPY      cystoscopy, normal      Social History     Tobacco Use     Smoking status: Never     Smokeless tobacco: Never   Substance Use Topics     Alcohol use: Yes     Alcohol/week: 0.0 standard drinks of alcohol      Problem (# of Occurrences) Relation (Name,Age of Onset)    Hypertension (1) Father (60)    Cerebrovascular Disease (1) Paternal Grandmother    Breast Cancer (1) Paternal Aunt: mid 50s    Hyperlipidemia (1) Father    Coronary Artery Disease (1) Father (60): summer 2014    No Known Problems (8) Mother,  "Sister, Brother, Maternal Grandmother, Maternal Grandfather, Paternal Grandfather, Other, Other            Current Outpatient Medications   Medication Sig     ADDERALL XR 25 MG 24 hr capsule      levonorgestrel (MIRENA) 20 MCG/24HR IUD 1 each (20 mcg) by Intrauterine route continuous Lot # [id number]  Exp: April 2021  NDC: 65506-522-52     levothyroxine (SYNTHROID/LEVOTHROID) 100 MCG tablet Take 1 tablet (100 mcg) by mouth daily     valACYclovir (VALTREX) 1000 mg tablet Take 2 tablets (2,000 mg) by mouth 2 times daily     No current facility-administered medications for this visit.     No Known Allergies    Past medical, surgical, social and family histories were reviewed and updated in EPIC.    ROS:   12 point review of systems negative other than symptoms noted below or in the HPI.  No urinary frequency or dysuria, bladder or kidney problems    EXAM:  BP 98/60   Ht 1.638 m (5' 4.5\")   Wt 64 kg (141 lb 3.2 oz)   Breastfeeding No   BMI 23.86 kg/m     BMI: Body mass index is 23.86 kg/m .    PHYSICAL EXAM:  Constitutional:   Appearance: Well nourished, well developed, alert, in no acute distress  Neck:  Lymph Nodes:  No lymphadenopathy present    Thyroid:  Gland size normal, nontender, no nodules or masses present  on palpation  Chest:  Respiratory Effort:  Breathing unlabored, CTAB  Cardiovascular:    Heart: Auscultation:  Regular rate, normal rhythm, no murmurs present  Breasts: Inspection of Breasts:  No lymphadenopathy present., Palpation of Breasts and Axillae:  No masses present on palpation, no breast tenderness., Axillary Lymph Nodes:  No lymphadenopathy present., and No nodularity, asymmetry or nipple discharge bilaterally.  Gastrointestinal:   Abdominal Examination:  Abdomen nontender to palpation, tone normal without rigidity or guarding, no masses present, umbilicus without lesions   Liver and Spleen:  No hepatomegaly present, liver nontender to palpation    Hernias:  No hernias present  Lymphatic: Lymph " Nodes:  No other lymphadenopathy present  Skin:  General Inspection:  No rashes present, no lesions present, no areas of  discoloration  Neurologic:    Mental Status:  Oriented X3.  Normal strength and tone, sensory exam grossly normal, mentation intact and speech normal.    Psychiatric:   Mentation appears normal and affect normal/bright.         Pelvic Exam:  External Genitalia:     Normal appearance for age, no discharge present, no tenderness present, no inflammatory lesions present, color normal  Vagina:    Normal vaginal vault without central or paravaginal defects, no discharge present, no inflammatory lesions present, no masses present  Bladder:     Nontender to palpation  Urethra:   Urethral Body:  Urethra palpation normal, urethra structural support normal   Urethral Meatus:  No erythema or lesions present  Cervix:     Appearance healthy, no lesions present, nontender to palpation, no bleeding present, STRING PRESENT  Uterus:     Nontender to palpation, no masses present, position anteflexed, mobility: normal  Adnexa:     No adnexal tenderness present, no adnexal masses present  Perineum:     Perineum within normal limits, no evidence of trauma, no rashes or skin lesions present  Anus:     Anus within normal limits, no hemorrhoids present  Inguinal Lymph Nodes:     No lymphadenopathy present  Pubic Hair:     Normal pubic hair distribution for age  Genitalia and Groin:     No rashes present, no lesions present, no areas of discoloration, no masses present    COUNSELING:   Reviewed preventive health counseling, as reflected in patient instructions  Special attention given to:        Regular exercise       Healthy diet/nutrition    BMI: Body mass index is 23.86 kg/m .      ASSESSMENT:  38 year old female with satisfactory annual exam.    ICD-10-CM    1. Encounter for gynecological examination without abnormal finding  Z01.419 Pap thin layer screen with HPV - recommended age 30 - 65 years     HPV Hold (Lab  Only)      2. Acquired hypothyroidism  E03.9 TSH     TSH     levothyroxine (SYNTHROID/LEVOTHROID) 100 MCG tablet     TSH      3. Hypercholesteremia  E78.00 Lipid panel reflex to direct LDL Fasting     Lipid panel reflex to direct LDL Fasting      4. Presence of of 52 mg levonorgestrel-releasing intrauterine device (IUD)  Z97.5       5. Prothrombin gene mutation (H)  D68.52       6. Elevated bilirubin  R17       7. Right lower quadrant pain  R10.31 US Pelvic Complete with Transvaginal      8. Need for prophylactic vaccination and inoculation against influenza  Z23       9. Screening for diabetes mellitus  Z13.1 Hemoglobin A1c     Hemoglobin A1c      10. Screening for metabolic disorder  Z13.228 Comprehensive metabolic panel     CBC with platelets     Comprehensive metabolic panel     CBC with platelets      11. Encounter for vitamin deficiency screening  Z13.21 Vitamin D Deficiency     Vitamin D Deficiency          PLAN:    Pap today.  Can follow routine ASCCP guidelines     First mammo next year and can be scheduled at time of annual with me.    Fasting labs today.  Will address any abnormalities once results are back.    levox refill vs dose adjustment once TSH returns     Discussed when and why to swap IUD earlier than the 8 yrs of FDA approval.  Her periods are monthly but very light and short and that is normal.  May not have improved amenorrhea with a new IUD as occasionally being caused b/c of atrophy, however she is very regular and monthly and clearly ovulatory based on other sx report  So she is good for another 3 yrs with it      Discussed the clinical significance of MTHFR gene mutation d/t updated research and it is really nothing of significance for fertility nor C.V dz as once thought  However her heterozygosity for PT2 could be a slight VTE risk so HRT in her should be undertaken with caution if she needs it in the future    Reviewed her RLQ pain with then radiation into her back and sounds very  typical of an ovarian cyst  Known to be more common with IUDs even though hasn't had one before  Could have caused some GI sx and changes but unlikely that it caused the appearance of her stool to be white  Needs to work on her chronic constipation so that is normal  However now that her pain has resolved no need for intervention or imaging  If it should recur should call right away and schedule U/S during the time of acute pain and then a phone visit can be after that even if more delayed, that way can capture what is causing the pain  If this is cysts and becomes more problematic will have to revisit best next steps but hopefully will be rare if ever again    Given her likely gilbert's and this stool coloration will need to monitor this. No jaundice noted today.  May need to consider PCP or GI referral   Will order an U/S in case pelvic pain recurs in the next year so can expedite scheduling it    31 minutes in addition to the routine annual preventative exam,  were spent on direct management of the patient's other medical issues as above as well as chart review including: imaging, lab work, previous visit notes by this provider, and other provider notes, as well as chart completion on the same DOS      Estefani Robles MD

## 2023-09-27 ENCOUNTER — OFFICE VISIT (OUTPATIENT)
Dept: OBGYN | Facility: CLINIC | Age: 38
End: 2023-09-27
Payer: COMMERCIAL

## 2023-09-27 VITALS
SYSTOLIC BLOOD PRESSURE: 98 MMHG | HEIGHT: 65 IN | BODY MASS INDEX: 23.53 KG/M2 | DIASTOLIC BLOOD PRESSURE: 60 MMHG | WEIGHT: 141.2 LBS

## 2023-09-27 DIAGNOSIS — R17 ELEVATED BILIRUBIN: ICD-10-CM

## 2023-09-27 DIAGNOSIS — Z13.228 SCREENING FOR METABOLIC DISORDER: ICD-10-CM

## 2023-09-27 DIAGNOSIS — R10.31 RIGHT LOWER QUADRANT PAIN: ICD-10-CM

## 2023-09-27 DIAGNOSIS — Z23 NEED FOR PROPHYLACTIC VACCINATION AND INOCULATION AGAINST INFLUENZA: ICD-10-CM

## 2023-09-27 DIAGNOSIS — E03.9 ACQUIRED HYPOTHYROIDISM: ICD-10-CM

## 2023-09-27 DIAGNOSIS — E78.00 HYPERCHOLESTEREMIA: ICD-10-CM

## 2023-09-27 DIAGNOSIS — Z97.5 PRESENCE OF 52 MG LEVONORGESTREL-RELEASING INTRAUTERINE DEVICE (IUD): ICD-10-CM

## 2023-09-27 DIAGNOSIS — Z13.21 ENCOUNTER FOR VITAMIN DEFICIENCY SCREENING: ICD-10-CM

## 2023-09-27 DIAGNOSIS — Z13.1 SCREENING FOR DIABETES MELLITUS: ICD-10-CM

## 2023-09-27 DIAGNOSIS — D68.52 PROTHROMBIN GENE MUTATION (H): ICD-10-CM

## 2023-09-27 DIAGNOSIS — Z01.419 ENCOUNTER FOR GYNECOLOGICAL EXAMINATION WITHOUT ABNORMAL FINDING: Primary | ICD-10-CM

## 2023-09-27 LAB
ALBUMIN SERPL BCG-MCNC: 4.8 G/DL (ref 3.5–5.2)
ALP SERPL-CCNC: 49 U/L (ref 35–104)
ALT SERPL W P-5'-P-CCNC: 12 U/L (ref 0–50)
ANION GAP SERPL CALCULATED.3IONS-SCNC: 13 MMOL/L (ref 7–15)
AST SERPL W P-5'-P-CCNC: 25 U/L (ref 0–45)
BILIRUB SERPL-MCNC: 1.5 MG/DL
BUN SERPL-MCNC: 16.6 MG/DL (ref 6–20)
CALCIUM SERPL-MCNC: 10.1 MG/DL (ref 8.6–10)
CHLORIDE SERPL-SCNC: 101 MMOL/L (ref 98–107)
CHOLEST SERPL-MCNC: 251 MG/DL
CREAT SERPL-MCNC: 1.03 MG/DL (ref 0.51–0.95)
DEPRECATED HCO3 PLAS-SCNC: 25 MMOL/L (ref 22–29)
EGFRCR SERPLBLD CKD-EPI 2021: 71 ML/MIN/1.73M2
ERYTHROCYTE [DISTWIDTH] IN BLOOD BY AUTOMATED COUNT: 11.5 % (ref 10–15)
GLUCOSE SERPL-MCNC: 84 MG/DL (ref 70–99)
HBA1C MFR BLD: 4.9 % (ref 0–5.6)
HCT VFR BLD AUTO: 47 % (ref 35–47)
HDLC SERPL-MCNC: 57 MG/DL
HGB BLD-MCNC: 15.6 G/DL (ref 11.7–15.7)
LDLC SERPL CALC-MCNC: 177 MG/DL
MCH RBC QN AUTO: 29.3 PG (ref 26.5–33)
MCHC RBC AUTO-ENTMCNC: 33.2 G/DL (ref 31.5–36.5)
MCV RBC AUTO: 88 FL (ref 78–100)
NONHDLC SERPL-MCNC: 194 MG/DL
PLATELET # BLD AUTO: 307 10E3/UL (ref 150–450)
POTASSIUM SERPL-SCNC: 4.6 MMOL/L (ref 3.4–5.3)
PROT SERPL-MCNC: 8.1 G/DL (ref 6.4–8.3)
RBC # BLD AUTO: 5.33 10E6/UL (ref 3.8–5.2)
SODIUM SERPL-SCNC: 139 MMOL/L (ref 135–145)
TRIGL SERPL-MCNC: 86 MG/DL
TSH SERPL DL<=0.005 MIU/L-ACNC: 4 UIU/ML (ref 0.3–4.2)
VIT D+METAB SERPL-MCNC: 37 NG/ML (ref 20–50)
WBC # BLD AUTO: 5.4 10E3/UL (ref 4–11)

## 2023-09-27 PROCEDURE — 80061 LIPID PANEL: CPT | Performed by: OBSTETRICS & GYNECOLOGY

## 2023-09-27 PROCEDURE — 85027 COMPLETE CBC AUTOMATED: CPT | Performed by: OBSTETRICS & GYNECOLOGY

## 2023-09-27 PROCEDURE — 99214 OFFICE O/P EST MOD 30 MIN: CPT | Mod: 25 | Performed by: OBSTETRICS & GYNECOLOGY

## 2023-09-27 PROCEDURE — 99395 PREV VISIT EST AGE 18-39: CPT | Performed by: OBSTETRICS & GYNECOLOGY

## 2023-09-27 PROCEDURE — 84443 ASSAY THYROID STIM HORMONE: CPT | Performed by: OBSTETRICS & GYNECOLOGY

## 2023-09-27 PROCEDURE — 82306 VITAMIN D 25 HYDROXY: CPT | Performed by: OBSTETRICS & GYNECOLOGY

## 2023-09-27 PROCEDURE — 83036 HEMOGLOBIN GLYCOSYLATED A1C: CPT | Performed by: OBSTETRICS & GYNECOLOGY

## 2023-09-27 PROCEDURE — 80053 COMPREHEN METABOLIC PANEL: CPT | Performed by: OBSTETRICS & GYNECOLOGY

## 2023-09-27 PROCEDURE — 87624 HPV HI-RISK TYP POOLED RSLT: CPT | Performed by: OBSTETRICS & GYNECOLOGY

## 2023-09-27 PROCEDURE — G0145 SCR C/V CYTO,THINLAYER,RESCR: HCPCS | Performed by: OBSTETRICS & GYNECOLOGY

## 2023-09-27 PROCEDURE — 36415 COLL VENOUS BLD VENIPUNCTURE: CPT | Performed by: OBSTETRICS & GYNECOLOGY

## 2023-09-27 RX ORDER — LEVOTHYROXINE SODIUM 88 UG/1
88 TABLET ORAL DAILY
Qty: 90 TABLET | Refills: 3 | Status: CANCELLED | OUTPATIENT
Start: 2023-09-27

## 2023-09-27 ASSESSMENT — ANXIETY QUESTIONNAIRES
3. WORRYING TOO MUCH ABOUT DIFFERENT THINGS: SEVERAL DAYS
6. BECOMING EASILY ANNOYED OR IRRITABLE: NOT AT ALL
5. BEING SO RESTLESS THAT IT IS HARD TO SIT STILL: NOT AT ALL
7. FEELING AFRAID AS IF SOMETHING AWFUL MIGHT HAPPEN: SEVERAL DAYS
2. NOT BEING ABLE TO STOP OR CONTROL WORRYING: NOT AT ALL
1. FEELING NERVOUS, ANXIOUS, OR ON EDGE: SEVERAL DAYS
GAD7 TOTAL SCORE: 3
IF YOU CHECKED OFF ANY PROBLEMS ON THIS QUESTIONNAIRE, HOW DIFFICULT HAVE THESE PROBLEMS MADE IT FOR YOU TO DO YOUR WORK, TAKE CARE OF THINGS AT HOME, OR GET ALONG WITH OTHER PEOPLE: SOMEWHAT DIFFICULT
GAD7 TOTAL SCORE: 3

## 2023-09-27 ASSESSMENT — PATIENT HEALTH QUESTIONNAIRE - PHQ9
5. POOR APPETITE OR OVEREATING: NOT AT ALL
SUM OF ALL RESPONSES TO PHQ QUESTIONS 1-9: 1

## 2023-09-28 PROBLEM — R17 ELEVATED BILIRUBIN: Status: ACTIVE | Noted: 2023-09-28

## 2023-09-28 PROBLEM — E78.00 HYPERCHOLESTEREMIA: Status: ACTIVE | Noted: 2023-09-28

## 2023-09-28 RX ORDER — LEVOTHYROXINE SODIUM 100 UG/1
100 TABLET ORAL DAILY
Qty: 60 TABLET | Refills: 0 | Status: SHIPPED | OUTPATIENT
Start: 2023-09-28 | End: 2023-11-06

## 2023-10-02 LAB
BKR LAB AP GYN ADEQUACY: NORMAL
BKR LAB AP GYN INTERPRETATION: NORMAL
BKR LAB AP HPV REFLEX: NORMAL
BKR LAB AP PREVIOUS ABNORMAL: NORMAL
PATH REPORT.COMMENTS IMP SPEC: NORMAL
PATH REPORT.COMMENTS IMP SPEC: NORMAL
PATH REPORT.RELEVANT HX SPEC: NORMAL

## 2023-10-03 LAB
HUMAN PAPILLOMA VIRUS 16 DNA: NEGATIVE
HUMAN PAPILLOMA VIRUS 18 DNA: NEGATIVE
HUMAN PAPILLOMA VIRUS FINAL DIAGNOSIS: NORMAL
HUMAN PAPILLOMA VIRUS OTHER HR: NEGATIVE

## 2023-11-01 DIAGNOSIS — E03.9 ACQUIRED HYPOTHYROIDISM: ICD-10-CM

## 2023-11-02 NOTE — TELEPHONE ENCOUNTER
"Requested Prescriptions   Pending Prescriptions Disp Refills    levothyroxine (SYNTHROID/LEVOTHROID) 100 MCG tablet [Pharmacy Med Name: LEVOTHYROXINE 0.100MG (100MCG) TAB] 90 tablet      Sig: TAKE 1 TABLET(100 MCG) BY MOUTH DAILY       Thyroid Protocol Passed - 11/1/2023  5:16 PM        Passed - Patient is 12 years or older        Passed - Recent (12 mo) or future (30 days) visit within the authorizing provider's specialty     Patient has had an office visit with the authorizing provider or a provider within the authorizing providers department within the previous 12 mos or has a future within next 30 days. See \"Patient Info\" tab in inbasket, or \"Choose Columns\" in Meds & Orders section of the refill encounter.              Passed - Medication is active on med list        Passed - Normal TSH on file in past 12 months     Recent Labs   Lab Test 09/27/23  1154   TSH 4.00              Passed - No active pregnancy on record     If patient is pregnant or has had a positive pregnancy test, please check TSH.          Passed - No positive pregnancy test in past 12 months     If patient is pregnant or has had a positive pregnancy test, please check TSH.             Last Written Prescription Date:  9/28/23  Last Fill Quantity: 60,  # refills: 0   Last office visit: 9/27/2023 ; last virtual visit: Visit date not found with prescribing provider:  Margaret   Future Office Visit:  9/30/24 with Dr. Robles    9/27/23 Fasting labs today.  Will address any abnormalities once results are back.    levox refill vs dose adjustment once TSH returns     9/27/23 TSH 4.0   No lab result note attached.    Routing to provider to advise. Okay to send for the year?  Frieda Najera RN on 11/2/2023 at 5:53 AM        "

## 2023-11-06 RX ORDER — LEVOTHYROXINE SODIUM 100 UG/1
100 TABLET ORAL DAILY
Qty: 60 TABLET | Refills: 0 | Status: SHIPPED | OUTPATIENT
Start: 2023-11-06 | End: 2024-01-23

## 2024-01-23 DIAGNOSIS — E03.9 ACQUIRED HYPOTHYROIDISM: ICD-10-CM

## 2024-01-23 DIAGNOSIS — E03.9 ACQUIRED HYPOTHYROIDISM: Primary | ICD-10-CM

## 2024-01-23 RX ORDER — LEVOTHYROXINE SODIUM 100 UG/1
100 TABLET ORAL DAILY
Qty: 90 TABLET | Refills: 0 | Status: SHIPPED | OUTPATIENT
Start: 2024-01-23 | End: 2024-01-29

## 2024-01-23 NOTE — TELEPHONE ENCOUNTER
Requested Prescriptions   Pending Prescriptions Disp Refills    levothyroxine (SYNTHROID/LEVOTHROID) 100 MCG tablet [Pharmacy Med Name: LEVOTHYROXINE 0.100MG (100MCG) TAB] 60 tablet 0     Sig: TAKE 1 TABLET(100 MCG) BY MOUTH DAILY       Thyroid Protocol Passed - 1/23/2024  8:21 AM        Passed - Patient is 12 years or older        Passed - Recent (12 mo) or future (30 days) visit within the authorizing provider's specialty     The patient must have completed an in-person or virtual visit within the past 12 months or has a future visit scheduled within the next 90 days with the authorizing provider s specialty.  Urgent care and e-visits do not quality as an office visit for this protocol.          Passed - Medication is active on med list        Passed - Normal TSH on file in past 12 months     Recent Labs   Lab Test 09/27/23  1154   TSH 4.00              Passed - No active pregnancy on record     If patient is pregnant or has had a positive pregnancy test, please check TSH.          Passed - No positive pregnancy test in past 12 months     If patient is pregnant or has had a positive pregnancy test, please check TSH.

## 2024-01-24 ENCOUNTER — MYC MEDICAL ADVICE (OUTPATIENT)
Dept: OBGYN | Facility: CLINIC | Age: 39
End: 2024-01-24
Payer: COMMERCIAL

## 2024-01-24 NOTE — TELEPHONE ENCOUNTER
Estefani Robles MD  P We Triage  Caller: Unspecified (Yesterday,  8:21 AM)  I don't see any [employer] message to the patient attached to her labs she recently had.  Not sure how or why that would have happened.  I must have wanted her to recheck her TSH in a couple of months b/c it was 4.0 and yet never communicated that to her.  I sent an addition #90 but do want her to come in for a TSH within the next 3 months to make sure we're optimally controlled and don't need to dose adjust.    AJ    Sent pt a [employer] message w instructions.    Linn Stevens, RN on 1/24/2024 at 1:33 PM

## 2024-01-29 DIAGNOSIS — E03.9 ACQUIRED HYPOTHYROIDISM: ICD-10-CM

## 2024-01-29 RX ORDER — LEVOTHYROXINE SODIUM 100 UG/1
100 TABLET ORAL DAILY
Qty: 90 TABLET | OUTPATIENT
Start: 2024-01-29

## 2024-01-29 RX ORDER — LEVOTHYROXINE SODIUM 100 UG/1
100 TABLET ORAL DAILY
Qty: 30 TABLET | Refills: 0 | Status: SHIPPED | OUTPATIENT
Start: 2024-01-29 | End: 2024-02-04

## 2024-01-29 NOTE — TELEPHONE ENCOUNTER
Pharmacy requesting 90 day but needs labs. 30 day only.    Linn Stevens RN on 1/29/2024 at 3:41 PM

## 2024-01-29 NOTE — TELEPHONE ENCOUNTER
Requested Prescriptions   Pending Prescriptions Disp Refills    levothyroxine (SYNTHROID/LEVOTHROID) 100 MCG tablet [Pharmacy Med Name: LEVOTHYROXINE 0.100MG (100MCG) TAB] 60 tablet      Sig: TAKE 1 TABLET(100 MCG) BY MOUTH DAILY       Thyroid Protocol Passed - 1/29/2024  2:54 PM        Passed - Patient is 12 years or older        Passed - Recent (12 mo) or future (30 days) visit within the authorizing provider's specialty     The patient must have completed an in-person or virtual visit within the past 12 months or has a future visit scheduled within the next 90 days with the authorizing provider s specialty.  Urgent care and e-visits do not quality as an office visit for this protocol.          Passed - Medication is active on med list        Passed - Normal TSH on file in past 12 months     Recent Labs   Lab Test 09/27/23  1154   TSH 4.00              Passed - No active pregnancy on record     If patient is pregnant or has had a positive pregnancy test, please check TSH.          Passed - No positive pregnancy test in past 12 months     If patient is pregnant or has had a positive pregnancy test, please check TSH.             Last Written Prescription Date:  1/23/24  Last Fill Quantity: 90,  # refills: 0   Last office visit: 9/27/2023 ; last virtual visit: Visit date not found with prescribing provider:  Dr Robles   Future Office Visit:  2/1/24 labs    30 tabs sent until labs completed    Linn Stevens RN on 1/29/2024 at 3:21 PM

## 2024-02-01 ENCOUNTER — LAB (OUTPATIENT)
Dept: LAB | Facility: CLINIC | Age: 39
End: 2024-02-01
Payer: COMMERCIAL

## 2024-02-01 DIAGNOSIS — E03.9 ACQUIRED HYPOTHYROIDISM: ICD-10-CM

## 2024-02-01 PROCEDURE — 36415 COLL VENOUS BLD VENIPUNCTURE: CPT

## 2024-02-01 PROCEDURE — 84443 ASSAY THYROID STIM HORMONE: CPT

## 2024-02-02 LAB — TSH SERPL DL<=0.005 MIU/L-ACNC: 0.11 UIU/ML (ref 0.3–4.2)

## 2024-02-04 DIAGNOSIS — E03.9 ACQUIRED HYPOTHYROIDISM: ICD-10-CM

## 2024-02-04 RX ORDER — LEVOTHYROXINE SODIUM 88 UG/1
TABLET ORAL
Qty: 60 TABLET | Refills: 0 | Status: SHIPPED | OUTPATIENT
Start: 2024-02-04 | End: 2024-02-22

## 2024-02-04 RX ORDER — LEVOTHYROXINE SODIUM 100 UG/1
TABLET ORAL
Qty: 60 TABLET | Refills: 0 | Status: SHIPPED | OUTPATIENT
Start: 2024-02-04 | End: 2024-02-22

## 2024-02-12 ENCOUNTER — TELEPHONE (OUTPATIENT)
Dept: OBGYN | Facility: CLINIC | Age: 39
End: 2024-02-12
Payer: COMMERCIAL

## 2024-02-12 DIAGNOSIS — E03.9 ACQUIRED HYPOTHYROIDISM: ICD-10-CM

## 2024-02-12 NOTE — TELEPHONE ENCOUNTER
Left patient a message regarding her un-viewed lab result and medication adjustment / follow-up.  Patient needs a 6 week recheck of her TSH after starting alternating dose of 100mcg and 88mcg. (see detailed result note by provider)    Regine Velasquez CMA on 2/12/2024 at 4:29 PM

## 2024-02-22 RX ORDER — LEVOTHYROXINE SODIUM 88 UG/1
TABLET ORAL
Qty: 15 TABLET | Refills: 0 | Status: SHIPPED | OUTPATIENT
Start: 2024-02-22 | End: 2024-04-07

## 2024-02-22 RX ORDER — LEVOTHYROXINE SODIUM 100 UG/1
TABLET ORAL
Qty: 20 TABLET | Refills: 0 | Status: SHIPPED | OUTPATIENT
Start: 2024-02-22 | End: 2024-03-19

## 2024-02-22 NOTE — TELEPHONE ENCOUNTER
Since patient never read the message via [employer] - she did not get the directions to alternate 88mcg and 100 mcg AND pharmacy told her back on 2/4/24 that it was too early to  the 100 mcg therefore did not see the NEW Directions as directed via [email or web address] from Dr Robles 2/12/24    Only taking Levothyroxine 88 mcg daily since her recent TSH.    Patient has levothyroxine on hand:  22 tabs of the 100mcg  21 tabs of the 88mcg    Rx sent to get her to the full 6 wks until repeats labs. She will start the alternating dosing.    Pt verbalized understanding, in agreement with plan, and voiced no further questions.    Routing to Dr Robles as FYI on situation.    Linn Stevens RN on 2/22/2024 at 3:24 PM

## 2024-02-22 NOTE — TELEPHONE ENCOUNTER
Left patient another message regarding Dr Robles result note attached to her TSH lab on 02/01/24. Wanted to make sure she was taking her new alternating dose of levothyroxine and schedule a follow-up in 6 weeks for a lab only appointment to check TSH (future order pending)    Called patients pharmacy and it was confirmed she picked up the 88mcg tablets on 02/06/24. It was too soon to pickup more of the 100mcg tablets. I will call patient back knowing she has started the new dosing and to call back to make appointment.

## 2024-02-22 NOTE — TELEPHONE ENCOUNTER
I was able to reach patient and there has been a bit of confusion on what the pharmacy was able to dispense and what she's been taking. I am going to route this to Triage to call her. I believe she may need additional tablets to give her enough to be on the proper dosing.     Sig: Take one tablet of 100 mcg by mouth on an empty stomach Monday-Thursday   Sig: Take one tablet of 88 mcg, by mouth on an empty stomach, Friday-Sunday     Once she is on the this dosing she understands to make a lab only appointment in 6 weeks.     Regine Velasquez CMA on 2/22/2024 at 2:43 PM

## 2024-03-19 DIAGNOSIS — E03.9 ACQUIRED HYPOTHYROIDISM: ICD-10-CM

## 2024-03-19 RX ORDER — LEVOTHYROXINE SODIUM 100 UG/1
TABLET ORAL
Qty: 20 TABLET | Refills: 0 | Status: SHIPPED | OUTPATIENT
Start: 2024-03-19 | End: 2024-04-07

## 2024-03-19 NOTE — TELEPHONE ENCOUNTER
Requested Prescriptions   Pending Prescriptions Disp Refills    levothyroxine (SYNTHROID/LEVOTHROID) 100 MCG tablet [Pharmacy Med Name: LEVOTHYROXINE 0.100MG (100MCG) TAB] 20 tablet 0     Sig: TAKE 1 TABLET BY MOUTH ON AN EMPTY STOMACH FROM MONDAY TO THURSDAY ALTERNATING WITH 88 MCG FROM FRIDAY TO SUNDAY.       Thyroid Protocol Failed - 3/19/2024 10:13 AM        Failed - Normal TSH on file in past 12 months     Recent Labs   Lab Test 02/01/24  1119   TSH 0.11*              Passed - Patient is 12 years or older        Passed - Recent (12 mo) or future (30 days) visit within the authorizing provider's specialty     The patient must have completed an in-person or virtual visit within the past 12 months or has a future visit scheduled within the next 90 days with the authorizing provider s specialty.  Urgent care and e-visits do not quality as an office visit for this protocol.          Passed - Medication is active on med list        Passed - No active pregnancy on record     If patient is pregnant or has had a positive pregnancy test, please check TSH.          Passed - No positive pregnancy test in past 12 months     If patient is pregnant or has had a positive pregnancy test, please check TSH.             Last Written Prescription Date:  2/22/24  Last Fill Quantity: 20,  # refills: 0   Last office visit: 9/27/2023 ; last virtual visit: Visit date not found with prescribing provider:  Dr Robles   Future Office Visit:      2/22/24 started the alternate dosing  6 wks repeat labs*-scheduled    Prescription approved per [facility] Refill Protocol.      Linn Stevens RN on 3/19/2024 at 12:4

## 2024-03-21 NOTE — TELEPHONE ENCOUNTER
10/01/18  Full counseling was provided, and all questions were answered.   Return to clinic in one year for an annual visit.      Estefani Robles MD   Protocol For Photochemotherapy: Baby Oil And Nbuvb: The patient received Photochemotherapy: Baby Oil and NBUVB (baby oil applied to all lesions prior to phototherapy). Protocol For Photochemotherapy: Tar And Broad Band Uvb (Goeckerman Treatment): The patient received Photochemotherapy: Tar and Broad Band UVB (Goeckerman treatment). Consent: Written consent obtained.  The risks were reviewed with the patient including but not limited to: burn, pigmentary changes, pain, blistering, scabbing, redness, increased risk of skin cancers, and the remote possibility of scarring. Protocol For Photochemotherapy For Severe Photoresponsive Dermatoses: Petrolatum And Nbuvb: The patient received Photochemotherapy for severe photoresponsive dermatoses: Petrolatum and NBUVB requiring at least 4 to 8 hours of care under direct physician supervision. Protocol For Photochemotherapy: Petrolatum And Nbuvb: The patient received Photochemotherapy: Petrolatum and NBUVB (petrolatum applied to all lesions prior to phototherapy). Protocol For Photochemotherapy For Severe Photoresponsive Dermatoses: Petrolatum And Broad Band Uvb: The patient received Photochemotherapyfor severe photoresponsive dermatoses: Petrolatum and Broad Band UVB requiring at least 4 to 8 hours of care under direct physician supervision. Protocol For Uva: The patient received UVA. Total Body Energy: 1219 Protocol For Photochemotherapy: Mineral Oil And Nbuvb: The patient received Photochemotherapy: Mineral Oil and NBUVB (mineral oil applied to all lesions prior to phototherapy). Protocol For Photochemotherapy: Petrolatum And Broad Band Uvb: The patient received Photochemotherapy: Petrolatum and Broad Band UVB. Render Post-Care In The Note: no Protocol For Puva: The patient received PUVA. Protocol For Nbuvb: The patient received NBUVB. Skin Type: III Treatment Number: 68 Protocol For Nb Uva: The patient received NB UVA. Protocol For Bath Puva: The patient received Bath PUVA. Protocol For Photochemotherapy: Tar And Nbuvb (Goeckerman Treatment): The patient received Photochemotherapy: Tar and NBUVB (Goeckerman treatment). Total Body Time: 3:20 Protocol For Photochemotherapy: Triamcinolone Ointment And Nbuvb: The patient received Photochemotherapy: Triamcinolone and NBUVB (triamcinolone ointment applied to all lesions prior to phototherapy). Protocol For Photochemotherapy: Mineral Oil And Broad Band Uvb: The patient received Photochemotherapy: Mineral Oil and Broad Band UVB. Protocol For Photochemotherapy For Severe Photoresponsive Dermatoses: Puva: The patient received Photochemotherapy for severe photoresponsive dermatoses: PUVA requiring at least 4 to 8 hours of care under direct physician supervision. Protocol For Broad Band Uvb: The patient received Broad Band UVB. Protocol For Protocol For Photochemotherapy For Severe Photoresponsive Dermatoses: Bath Puva: The patient received Photochemotherapy for severe photoresponsive dermatoses: Bath PUVA requiring at least 4 to 8 hours of care under direct physician supervision. Detail Level: Zone Post-Care Instructions: I reviewed with the patient in detail post-care instructions. Patient is to wear sun protection. Patients may expect sunburn like redness, discomfort and scabbing. Protocol For Photochemotherapy For Severe Photoresponsive Dermatoses: Tar And Nbuvb (Goeckerman Treatment): The patient received Photochemotherapy for severe photoresponsive dermatoses: Tar and NBUVB (Goeckerman treatment) requiring at least 4 to 8 hours of care under direct physician supervision. Protocol: NBUVB Protocol For Photochemotherapy For Severe Photoresponsive Dermatoses: Tar And Broad Band Uvb (Goeckerman Treatment): The patient received Photochemotherapy for severe photoresponsive dermatoses: Tar and Broad Band UVB (Goeckerman treatment) requiring at least 4 to 8 hours of care under direct physician supervision. Protocol For Uva1: The patient received UVA1.

## 2024-04-05 ENCOUNTER — LAB (OUTPATIENT)
Dept: LAB | Facility: CLINIC | Age: 39
End: 2024-04-05
Payer: COMMERCIAL

## 2024-04-05 DIAGNOSIS — E03.9 ACQUIRED HYPOTHYROIDISM: ICD-10-CM

## 2024-04-05 DIAGNOSIS — R17 ELEVATED BILIRUBIN: ICD-10-CM

## 2024-04-05 DIAGNOSIS — R79.89 ELEVATED SERUM CREATININE: Primary | ICD-10-CM

## 2024-04-05 DIAGNOSIS — E83.52 SERUM CALCIUM ELEVATED: ICD-10-CM

## 2024-04-05 LAB — TSH SERPL DL<=0.005 MIU/L-ACNC: 0.35 UIU/ML (ref 0.3–4.2)

## 2024-04-05 PROCEDURE — 84443 ASSAY THYROID STIM HORMONE: CPT

## 2024-04-05 PROCEDURE — 36415 COLL VENOUS BLD VENIPUNCTURE: CPT

## 2024-04-07 DIAGNOSIS — E03.9 ACQUIRED HYPOTHYROIDISM: ICD-10-CM

## 2024-04-07 RX ORDER — LEVOTHYROXINE SODIUM 88 UG/1
TABLET ORAL
Qty: 60 TABLET | Refills: 0 | Status: SHIPPED | OUTPATIENT
Start: 2024-04-07 | End: 2024-04-09

## 2024-04-09 RX ORDER — LEVOTHYROXINE SODIUM 88 UG/1
TABLET ORAL
Qty: 90 TABLET | Refills: 0 | Status: SHIPPED | OUTPATIENT
Start: 2024-04-09 | End: 2024-07-08

## 2024-04-09 NOTE — TELEPHONE ENCOUNTER
Requested Prescriptions   Pending Prescriptions Disp Refills    levothyroxine (SYNTHROID/LEVOTHROID) 88 MCG tablet [Pharmacy Med Name: LEVOTHYROXINE 0.088MG (88MCG) TAB] 90 tablet      Sig: TAKE 1 TABLET BY MOUTH EVERY MORNING ON AN EMPTY STOMACH       Thyroid Protocol Passed - 4/7/2024 11:44 AM        Passed - Patient is 12 years or older        Passed - Recent (12 mo) or future (30 days) visit within the authorizing provider's specialty     The patient must have completed an in-person or virtual visit within the past 12 months or has a future visit scheduled within the next 90 days with the authorizing provider s specialty.  Urgent care and e-visits do not quality as an office visit for this protocol.          Passed - Medication is active on med list        Passed - Normal TSH on file in past 12 months     Recent Labs   Lab Test 04/05/24  0901   TSH 0.35              Passed - No active pregnancy on record     If patient is pregnant or has had a positive pregnancy test, please check TSH.          Passed - No positive pregnancy test in past 12 months     If patient is pregnant or has had a positive pregnancy test, please check TSH.             Last Written Prescription Date:  4/7/24  Last Fill Quantity: #60, 0 refills   Last office visit:     Rx for 90 days sent to pharmacy.    Phylicia Lopez RN on 4/9/2024 at 10:05 AM

## 2024-07-02 DIAGNOSIS — E03.9 ACQUIRED HYPOTHYROIDISM: ICD-10-CM

## 2024-07-03 RX ORDER — LEVOTHYROXINE SODIUM 88 UG/1
TABLET ORAL
Qty: 60 TABLET | OUTPATIENT
Start: 2024-07-03

## 2024-07-03 NOTE — TELEPHONE ENCOUNTER
Requested Prescriptions   Pending Prescriptions Disp Refills    levothyroxine (SYNTHROID/LEVOTHROID) 88 MCG tablet [Pharmacy Med Name: LEVOTHYROXINE 0.088MG (88MCG) TAB] 60 tablet      Sig: TAKE 1 TABLET BY MOUTH EVERY MORNING ON AN EMPTY STOMACH       Thyroid Protocol Passed - 7/2/2024  5:16 PM        Passed - Patient is 12 years or older        Passed - Recent (12 mo) or future (30 days) visit within the authorizing provider's specialty     The patient must have completed an in-person or virtual visit within the past 12 months or has a future visit scheduled within the next 90 days with the authorizing provider s specialty.  Urgent care and e-visits do not quality as an office visit for this protocol.          Passed - Medication is active on med list        Passed - Medication indicated for associated diagnosis     Medication is associated with one or more of the following diagnoses:  Hypothyroidism  Thyroid stimulating hormone suppression therapy  Thyroid cancer          Passed - Normal TSH on file in past 12 months     Recent Labs   Lab Test 04/05/24  0901   TSH 0.35              Passed - No active pregnancy on record     If patient is pregnant or has had a positive pregnancy test, please check TSH.          Passed - No positive pregnancy test in past 12 months     If patient is pregnant or has had a positive pregnancy test, please check TSH.             Last Written Prescription Date:  4/9/24  Last Fill Quantity: 90,  # refills: 0   Last office visit: Visit date not found ; last virtual visit: Visit date not found with prescribing provider:  Margaret   Future Office Visit:      Pt needs repeat testing prior to further refills  Routing to scheduling to update pt.  Sruthi Desir RN on 7/3/2024 at 9:13 AM

## 2024-07-03 NOTE — TELEPHONE ENCOUNTER
She needed repeat LABS - 4/5/24 was told to repeat labs 6-8 weeks    Called pt and left detailed vm - lab only visit needed to ensure correct dosing.  Let us know if needs a refill extension on levo 88mcg    Linn Stevens RN on 7/3/2024 at 9:26 AM

## 2024-07-08 RX ORDER — LEVOTHYROXINE SODIUM 88 UG/1
TABLET ORAL
Qty: 30 TABLET | Refills: 0 | Status: SHIPPED | OUTPATIENT
Start: 2024-07-08 | End: 2024-07-12

## 2024-07-08 NOTE — TELEPHONE ENCOUNTER
[facility] Call Center    Phone Message    May a detailed message be left on voicemail: yes     Reason for Call: Other: Pt is scheduled for labs on 7/11- she ihas one more dose of her medication remaining for Tuesday 7/9. Pt is wanting  to know if the prescription can be refilled/sent to [facility] so she does not run out?      Action Taken: Message routed to:  Other: [place]    Travel Screening: Not Applicable     Date of Service:

## 2024-07-11 ENCOUNTER — LAB (OUTPATIENT)
Dept: LAB | Facility: CLINIC | Age: 39
End: 2024-07-11
Payer: COMMERCIAL

## 2024-07-11 DIAGNOSIS — E83.52 SERUM CALCIUM ELEVATED: ICD-10-CM

## 2024-07-11 DIAGNOSIS — E03.9 ACQUIRED HYPOTHYROIDISM: ICD-10-CM

## 2024-07-11 DIAGNOSIS — R79.89 ELEVATED SERUM CREATININE: ICD-10-CM

## 2024-07-11 DIAGNOSIS — R17 ELEVATED BILIRUBIN: ICD-10-CM

## 2024-07-11 PROCEDURE — 80053 COMPREHEN METABOLIC PANEL: CPT

## 2024-07-11 PROCEDURE — 36415 COLL VENOUS BLD VENIPUNCTURE: CPT

## 2024-07-11 PROCEDURE — 84443 ASSAY THYROID STIM HORMONE: CPT

## 2024-07-12 DIAGNOSIS — E03.9 ACQUIRED HYPOTHYROIDISM: ICD-10-CM

## 2024-07-12 LAB
ALBUMIN SERPL BCG-MCNC: 4.4 G/DL (ref 3.5–5.2)
ALP SERPL-CCNC: 51 U/L (ref 40–150)
ALT SERPL W P-5'-P-CCNC: 31 U/L (ref 0–50)
ANION GAP SERPL CALCULATED.3IONS-SCNC: 10 MMOL/L (ref 7–15)
AST SERPL W P-5'-P-CCNC: 26 U/L (ref 0–45)
BILIRUB SERPL-MCNC: 1.4 MG/DL
BUN SERPL-MCNC: 17.4 MG/DL (ref 6–20)
CALCIUM SERPL-MCNC: 9.6 MG/DL (ref 8.6–10)
CHLORIDE SERPL-SCNC: 105 MMOL/L (ref 98–107)
CREAT SERPL-MCNC: 0.88 MG/DL (ref 0.51–0.95)
DEPRECATED HCO3 PLAS-SCNC: 28 MMOL/L (ref 22–29)
EGFRCR SERPLBLD CKD-EPI 2021: 85 ML/MIN/1.73M2
GLUCOSE SERPL-MCNC: 80 MG/DL (ref 70–99)
POTASSIUM SERPL-SCNC: 5 MMOL/L (ref 3.4–5.3)
PROT SERPL-MCNC: 7.4 G/DL (ref 6.4–8.3)
SODIUM SERPL-SCNC: 143 MMOL/L (ref 135–145)
TSH SERPL DL<=0.005 MIU/L-ACNC: 0.62 UIU/ML (ref 0.3–4.2)

## 2024-07-12 RX ORDER — LEVOTHYROXINE SODIUM 88 UG/1
TABLET ORAL
Qty: 90 TABLET | Refills: 3 | Status: SHIPPED | OUTPATIENT
Start: 2024-07-12 | End: 2024-09-30

## 2024-09-01 ENCOUNTER — HEALTH MAINTENANCE LETTER (OUTPATIENT)
Age: 39
End: 2024-09-01

## 2024-09-30 ENCOUNTER — OFFICE VISIT (OUTPATIENT)
Dept: OBGYN | Facility: CLINIC | Age: 39
End: 2024-09-30
Payer: COMMERCIAL

## 2024-09-30 VITALS
WEIGHT: 141.8 LBS | BODY MASS INDEX: 24.21 KG/M2 | HEIGHT: 64 IN | SYSTOLIC BLOOD PRESSURE: 110 MMHG | DIASTOLIC BLOOD PRESSURE: 60 MMHG

## 2024-09-30 DIAGNOSIS — R17 ELEVATED BILIRUBIN: ICD-10-CM

## 2024-09-30 DIAGNOSIS — Z11.3 SCREENING EXAMINATION FOR STD (SEXUALLY TRANSMITTED DISEASE): ICD-10-CM

## 2024-09-30 DIAGNOSIS — E83.52 SERUM CALCIUM ELEVATED: ICD-10-CM

## 2024-09-30 DIAGNOSIS — E78.00 HYPERCHOLESTEREMIA: ICD-10-CM

## 2024-09-30 DIAGNOSIS — Z13.0 SCREENING FOR DISORDER OF BLOOD AND BLOOD-FORMING ORGANS: ICD-10-CM

## 2024-09-30 DIAGNOSIS — E03.9 ACQUIRED HYPOTHYROIDISM: ICD-10-CM

## 2024-09-30 DIAGNOSIS — R00.0 TACHYCARDIA: ICD-10-CM

## 2024-09-30 DIAGNOSIS — Z97.5 PRESENCE OF 52 MG LEVONORGESTREL-RELEASING INTRAUTERINE DEVICE (IUD): ICD-10-CM

## 2024-09-30 DIAGNOSIS — Z13.1 SCREENING FOR DIABETES MELLITUS: ICD-10-CM

## 2024-09-30 DIAGNOSIS — Z01.419 ENCOUNTER FOR GYNECOLOGICAL EXAMINATION WITHOUT ABNORMAL FINDING: Primary | ICD-10-CM

## 2024-09-30 DIAGNOSIS — Z11.4 SCREENING FOR HIV (HUMAN IMMUNODEFICIENCY VIRUS): ICD-10-CM

## 2024-09-30 DIAGNOSIS — Z13.228 SCREENING FOR METABOLIC DISORDER: ICD-10-CM

## 2024-09-30 DIAGNOSIS — Z11.8 SCREENING FOR CHLAMYDIAL DISEASE: ICD-10-CM

## 2024-09-30 DIAGNOSIS — Z13.21 ENCOUNTER FOR VITAMIN DEFICIENCY SCREENING: ICD-10-CM

## 2024-09-30 LAB
ALBUMIN SERPL BCG-MCNC: 4.4 G/DL (ref 3.5–5.2)
ALP SERPL-CCNC: 57 U/L (ref 40–150)
ALT SERPL W P-5'-P-CCNC: 19 U/L (ref 0–50)
ANION GAP SERPL CALCULATED.3IONS-SCNC: 11 MMOL/L (ref 7–15)
AST SERPL W P-5'-P-CCNC: 26 U/L (ref 0–45)
BILIRUB SERPL-MCNC: 1.2 MG/DL
BUN SERPL-MCNC: 15.5 MG/DL (ref 6–20)
CALCIUM SERPL-MCNC: 9.5 MG/DL (ref 8.8–10.4)
CHLORIDE SERPL-SCNC: 104 MMOL/L (ref 98–107)
CHOLEST SERPL-MCNC: 214 MG/DL
CREAT SERPL-MCNC: 0.96 MG/DL (ref 0.51–0.95)
EGFRCR SERPLBLD CKD-EPI 2021: 76 ML/MIN/1.73M2
ERYTHROCYTE [DISTWIDTH] IN BLOOD BY AUTOMATED COUNT: 11.3 % (ref 10–15)
EST. AVERAGE GLUCOSE BLD GHB EST-MCNC: 91 MG/DL
FASTING STATUS PATIENT QL REPORTED: ABNORMAL
FASTING STATUS PATIENT QL REPORTED: ABNORMAL
GLUCOSE SERPL-MCNC: 85 MG/DL (ref 70–99)
HBA1C MFR BLD: 4.8 % (ref 0–5.6)
HCO3 SERPL-SCNC: 25 MMOL/L (ref 22–29)
HCT VFR BLD AUTO: 42.1 % (ref 35–47)
HDLC SERPL-MCNC: 57 MG/DL
HGB BLD-MCNC: 13.8 G/DL (ref 11.7–15.7)
HIV 1+2 AB+HIV1 P24 AG SERPL QL IA: NONREACTIVE
LDLC SERPL CALC-MCNC: 146 MG/DL
MCH RBC QN AUTO: 29.6 PG (ref 26.5–33)
MCHC RBC AUTO-ENTMCNC: 32.8 G/DL (ref 31.5–36.5)
MCV RBC AUTO: 90 FL (ref 78–100)
NONHDLC SERPL-MCNC: 157 MG/DL
PLATELET # BLD AUTO: 336 10E3/UL (ref 150–450)
POTASSIUM SERPL-SCNC: 3.9 MMOL/L (ref 3.4–5.3)
PROT SERPL-MCNC: 7.4 G/DL (ref 6.4–8.3)
RBC # BLD AUTO: 4.67 10E6/UL (ref 3.8–5.2)
SODIUM SERPL-SCNC: 140 MMOL/L (ref 135–145)
TRIGL SERPL-MCNC: 53 MG/DL
TSH SERPL DL<=0.005 MIU/L-ACNC: 2.76 UIU/ML (ref 0.3–4.2)
VIT D+METAB SERPL-MCNC: 34 NG/ML (ref 20–50)
WBC # BLD AUTO: 4.6 10E3/UL (ref 4–11)

## 2024-09-30 PROCEDURE — 87591 N.GONORRHOEAE DNA AMP PROB: CPT | Performed by: OBSTETRICS & GYNECOLOGY

## 2024-09-30 PROCEDURE — G0124 SCREEN C/V THIN LAYER BY MD: HCPCS | Performed by: PATHOLOGY

## 2024-09-30 PROCEDURE — 80053 COMPREHEN METABOLIC PANEL: CPT | Performed by: OBSTETRICS & GYNECOLOGY

## 2024-09-30 PROCEDURE — 99396 PREV VISIT EST AGE 40-64: CPT | Performed by: OBSTETRICS & GYNECOLOGY

## 2024-09-30 PROCEDURE — 87624 HPV HI-RISK TYP POOLED RSLT: CPT | Performed by: OBSTETRICS & GYNECOLOGY

## 2024-09-30 PROCEDURE — 87389 HIV-1 AG W/HIV-1&-2 AB AG IA: CPT | Performed by: OBSTETRICS & GYNECOLOGY

## 2024-09-30 PROCEDURE — 84443 ASSAY THYROID STIM HORMONE: CPT | Performed by: OBSTETRICS & GYNECOLOGY

## 2024-09-30 PROCEDURE — 99214 OFFICE O/P EST MOD 30 MIN: CPT | Mod: 25 | Performed by: OBSTETRICS & GYNECOLOGY

## 2024-09-30 PROCEDURE — 80061 LIPID PANEL: CPT | Performed by: OBSTETRICS & GYNECOLOGY

## 2024-09-30 PROCEDURE — 82306 VITAMIN D 25 HYDROXY: CPT | Performed by: OBSTETRICS & GYNECOLOGY

## 2024-09-30 PROCEDURE — 85027 COMPLETE CBC AUTOMATED: CPT | Performed by: OBSTETRICS & GYNECOLOGY

## 2024-09-30 PROCEDURE — G0145 SCR C/V CYTO,THINLAYER,RESCR: HCPCS | Performed by: OBSTETRICS & GYNECOLOGY

## 2024-09-30 PROCEDURE — 83036 HEMOGLOBIN GLYCOSYLATED A1C: CPT | Performed by: OBSTETRICS & GYNECOLOGY

## 2024-09-30 PROCEDURE — 86780 TREPONEMA PALLIDUM: CPT | Performed by: OBSTETRICS & GYNECOLOGY

## 2024-09-30 PROCEDURE — 87491 CHLMYD TRACH DNA AMP PROBE: CPT | Performed by: OBSTETRICS & GYNECOLOGY

## 2024-09-30 PROCEDURE — 36415 COLL VENOUS BLD VENIPUNCTURE: CPT | Performed by: OBSTETRICS & GYNECOLOGY

## 2024-09-30 RX ORDER — LEVOTHYROXINE SODIUM 88 UG/1
TABLET ORAL
Qty: 90 TABLET | Refills: 3 | Status: CANCELLED | OUTPATIENT
Start: 2024-09-30

## 2024-09-30 RX ORDER — LEVOTHYROXINE SODIUM 88 UG/1
TABLET ORAL
Qty: 90 TABLET | Refills: 3 | Status: SHIPPED | OUTPATIENT
Start: 2024-09-30

## 2024-09-30 NOTE — PROGRESS NOTES
Sigrid is a 39 year old  female who presents for annual exam.     Besides routine health maintenance,  she would like to discuss heart rate.    HPI:  The patient's PCP is Physician No Ref-Primary.    Patient is being seen today for an annual physical.     Doing very well with Mirena which was placed 10/2018. Reported she spots maybe 2 months out of the year, and at most it's for 3 days and other times it's barely anything other than with wiping. No cramping, no pain, no v.m sx and not other concerns and very happy with it.    Last year had some RLQ pain and in the end I attributed it to GI source but had considered an ovarian cyst potentially. However it had resolved by the time of our visit together so no imaging done then but hasn't had the pain since.  Today showing the pain much more RUQ than lower but then states she just doesn't remember for sure b/c more than a year ago.  Had had some slightly whiter stool x1 and bili was slightly high last year and also some constipation. So discussed further f/up if sx persisted but they have completely resolved.    Takes 88mg levothyroxine for hypothyroidism and between  and this appointment we have adjusted her dose up, then did some months of split dosing half the week and now actually back to 88 mcg. Wondering If maybe had been taking vitamins with her levox last yaer and that's why her TSH was high and now is being careful to avoid that b/c once did that her TSH became overcorrected.  Doing TSH today along with fasting labs for recheck on that.  .  Uses Valtrex prn for HSV-1, but also is HSV-2 pos.the latter is exceedingly rare. Didn't request refill on valtrex today.  Not asked specifically about if she's dating but is s.a and requesting full STD testing    Fhx father has Afib and she has a lot of other medical things that he has.  Her resting heart rate has been high and feels like even at 8913-2557 it can still be in the 90s.  She is on adderall. Takes it  at 8 am. Is doing sort of intermittent fasting with it and doesn't feel sx from the HR but when checked and it was so high her dad was more worried about her afib risk.  She has usually 2 cups of coffee a day. However brews one keurig K cup and then her second cup is just water run through original cup so just really watered down. Occasional soda but not often.  No other otc sudafed or allergy/cold meds.    At last year's visit had lost 10# and her weight is stable this year. BMI is within normal range.  Patient has a H.o elevated LDL in the 170s and even last year when she had lost about 20# overall from around 2020 weight gain.  Discussed genetics and potential need for meds given her VTE risk factors and CVD risk factors with thrombophilia positive labs and fhx      GYNECOLOGIC HISTORY:    No LMP recorded. (Menstrual status: IUD). 2018    Her current contraception method is: IUD.light occasional spotting     She  reports that she has never smoked. She has never used smokeless tobacco.    Patient is sexually active.  STD testing offered?  Accepted  Last PHQ-9 score on record =       2023    10:43 AM   PHQ-9 SCORE   PHQ-9 Total Score 1     Last GAD7 score on record =       2023    10:43 AM   MARIO-7 SCORE   Total Score 3     Alcohol Score =     HEALTH MAINTENANCE:  Cholesterol:   Recent Labs   Lab Test 23  1154 22  1136   CHOL 251* 214*   HDL 57 48*   * 143*   TRIG 86 114     Last Mammo: Not applicable, Result: Not applicable, Next Mammo: Due at age 39     Pap:   Lab Results   Component Value Date    GYNINTERP  2023     Negative for Intraepithelial Lesion or Malignancy (NILM)    PAP NIL 2020    PAP Negative 2013    PAP LSIL 2012     Colonoscopy:  NA, Result: Not applicable, Next Colonoscopy: 45 years.  Dexa:  NA    Health maintenance updated:  yes,     HISTORY:  OB History    Para Term  AB Living   1 1 1 0 0 1   SAB IAB Ectopic Multiple Live Births    0 0 0 0 1      # Outcome Date GA Lbr Arnie/2nd Weight Sex Type Anes PTL Lv   1 Term 07/18/18 39w0d 05:30 / 02:03 3 kg (6 lb 9.8 oz) M Vag-Vacuum EPI N YOSHI      Birth Comments: PROM for 25 hours. ROM at 2145, pit started at 0600. developed chorio about 1hour before delivery. did vacuum for maternal fever/exhaustion/tachycardia/near syncope with pushing and baby to NICU but stable. 2nd degree lac veering to left. ebl 500cc       Complications: Prolonged PROM (>18 hours)      Name: Richar      Apgar1: 9  Apgar5: 9       Patient Active Problem List   Diagnosis    Family history of heart attack    Herpes simplex vulvovaginitis    Cold sore    Prothrombin gene mutation (H)    MTHFR mutation    Acquired hypothyroidism    Presence of of 52 mg levonorgestrel-releasing intrauterine device (IUD)    High serum ferritin    Low grade squamous intraepithelial lesion (LGSIL) on cervical Pap smear    Cervical high risk HPV (human papillomavirus) test positive    Attention deficit hyperactivity disorder (ADHD), predominantly inattentive type    Hypercholesteremia    Elevated bilirubin     Past Surgical History:   Procedure Laterality Date    CYSTOSCOPY  8/05    cystoscopy, normal      Social History     Tobacco Use    Smoking status: Never    Smokeless tobacco: Never   Substance Use Topics    Alcohol use: Yes     Alcohol/week: 0.0 standard drinks of alcohol      Problem (# of Occurrences) Relation (Name,Age of Onset)    Hypertension (1) Father (60)    Cerebrovascular Disease (1) Paternal Grandmother    Breast Cancer (1) Paternal Aunt: mid 50s    Hyperlipidemia (1) Father    Coronary Artery Disease (1) Father (60): summer 2014    No Known Problems (8) Mother, Sister, Brother, Maternal Grandmother, Maternal Grandfather, Paternal Grandfather, Other, Other              Current Outpatient Medications   Medication Sig Dispense Refill    ADDERALL XR 25 MG 24 hr capsule       levonorgestrel (MIRENA) 20 MCG/24HR IUD 1 each (20 mcg) by  "Intrauterine route continuous Lot # [id number]  Exp: April 2021  NDC: 66255-085-69      levothyroxine (SYNTHROID/LEVOTHROID) 88 MCG tablet TAKE 1 TABLET BY MOUTH EVERY MORNING ON AN EMPTY STOMACH 90 tablet 3    valACYclovir (VALTREX) 1000 mg tablet Take 2 tablets (2,000 mg) by mouth 2 times daily 12 tablet 1     No current facility-administered medications for this visit.     No Known Allergies    Past medical, surgical, social and family histories were reviewed and updated in [facility].    EXAM:  /60   Ht 1.632 m (5' 4.25\")   Wt 64.3 kg (141 lb 12.8 oz)   Breastfeeding No   BMI 24.15 kg/m     BMI: Body mass index is 24.15 kg/m .    PHYSICAL EXAM:  Constitutional:   Appearance: Well nourished, well developed, alert, in no acute distress, fidgeting and moving around and moving hair and appears to be due some to potentially stimulant effect as doesn't appear to be anxious and has appeared consistently like this for yrs  Neck:  Lymph Nodes:  No lymphadenopathy present    Thyroid:  Gland size normal, nontender, no nodules or masses present  on palpation  Chest:  Respiratory Effort:  Breathing unlabored, CTA B  Cardiovascular:    Heart: Auscultation:  Regular rate BUT ABOUT 90. DID PHONE ANDER AND SHOWED 98, normal rhythm, no murmurs present  Breasts: Inspection of Breasts:  No lymphadenopathy present., Palpation of Breasts and Axillae:  No masses present on palpation, no breast tenderness., Axillary Lymph Nodes:  No lymphadenopathy present., and No nodularity, asymmetry or nipple discharge bilaterally.  Gastrointestinal:   Abdominal Examination:  Abdomen nontender to palpation, tone normal without rigidity or guarding, no masses present, umbilicus without lesions   Liver and Spleen:  No hepatomegaly present, liver nontender to palpation    Hernias:  No hernias present  Lymphatic: Lymph Nodes:  No other lymphadenopathy present  Skin:  General Inspection:  No rashes present, no lesions present, no areas of "  discoloration  Neurologic:    Mental Status:  Oriented X3.  Normal strength and tone, sensory exam                grossly normal, mentation intact and speech normal.    Psychiatric:   Mentation appears normal and affect normal/bright.         Pelvic Exam:  External Genitalia:     Normal appearance for age, no discharge present, no tenderness present, no inflammatory lesions present, color normal  Vagina:    Normal vaginal vault without central or paravaginal defects, no discharge present, no inflammatory lesions present, no masses present  Bladder:     Nontender to palpation  Urethra:   Urethral Body:  Urethra palpation normal, urethra structural support normal   Urethral Meatus:  No erythema or lesions present  Cervix:     Appearance GENERALLY HEALTHY BUT INITIALLY HAD SLIGHTLY MORE WHITENED APPEARANCE OF THE UPPER LIP OF IT,  NOT OVERTLY CONCERNING/DYSPLASIA APPEARING BUT SOMEWHAT ATYPICAL no lesions present, nontender to palpation, no bleeding present, IUD STRING PRESENT  Uterus:     Nontender to palpation, no masses present, position anteflexed, mobility: normal  Adnexa:     No adnexal tenderness present, no adnexal masses present  Perineum:     Perineum within normal limits, no evidence of trauma, no rashes or skin lesions present  Anus:     Anus within normal limits, no hemorrhoids present  Inguinal Lymph Nodes:     No lymphadenopathy present  Pubic Hair:     Normal pubic hair distribution for age  Genitalia and Groin:     No rashes present, no lesions present, no areas of discoloration, no masses present    COUNSELING:   Reviewed preventive health counseling, as reflected in patient instructions       Regular exercise       Healthy diet/nutrition       Immunizations  Declined: Covid-19 and Influenza due to Concerns about side effects/safety           Contraception    BMI: Body mass index is 24.15 kg/m .      ASSESSMENT:  39 year old female with satisfactory annual exam.    ICD-10-CM    1. Encounter for  gynecological examination without abnormal finding  Z01.419 HPV and Gynecologic Cytology Panel - Recommended Age 30-65 Years      2. Hypercholesteremia  E78.00 Lipid panel reflex to direct LDL Fasting     Lipid panel reflex to direct LDL Fasting      3. Elevated bilirubin  R17 Comprehensive metabolic panel     Comprehensive metabolic panel      4. Acquired hypothyroidism  E03.9 TSH     TSH      5. Serum calcium elevated  E83.52 Comprehensive metabolic panel     Comprehensive metabolic panel      6. Tachycardia  R00.0       7. Presence of of 52 mg levonorgestrel-releasing intrauterine device (IUD)  Z97.5       8. Screening examination for STD (sexually transmitted disease)  Z11.3 NEISSERIA GONORRHOEA PCR     Treponema Abs w Reflex to RPR and Titer     CANCELED: Herpes Simplex Virus 1 and 2 IgG      9. Screening for HIV (human immunodeficiency virus)  Z11.4 HIV Antigen Antibody Combo Cascade      10. Screening for chlamydial disease  Z11.8 CHLAMYDIA TRACHOMATIS PCR      11. Screening for disorder of blood and blood-forming organs  Z13.0 CBC with platelets     CBC with platelets      12. Encounter for vitamin deficiency screening  Z13.21 Vitamin D Deficiency     Vitamin D Deficiency      13. Screening for diabetes mellitus  Z13.1 Hemoglobin A1c     Hemoglobin A1c      14. Screening for metabolic disorder  Z13.228 Comprehensive metabolic panel     Comprehensive metabolic panel          PLAN:  Pap today only due to slightly atypical appearance. Could very well just be some adherent normal discharge but since not long term sexually monogamous partner at this time and remote h.o abnormal pap but neg for dysplasia, will just do one today, earlier than rec and if normal then can return to follow routine ASCCP guidelines     Mammo due and didn't confirm coverage and age and interval prior to visit so cancelled the one she had  Will schedule ASAP so can keep on same schedule as her visit with me going forward    Fasting labs  today and given her normal BMI if her LDl is still >170 then do recommend not only statin therapy but getting established with a PCP  Given her young age for this, clearly genetic component with her dad's history, her multiple hypercoag factors this will be necessary  Once lab results back will contact her and determine med start or just tx to a new PCP to manage this going forward.    Will await TSH and then refill her levothyroxine as well.       Additional health issues addressed at today's visit include:    Discussed FDA approved Mirena IUD longevity of 8 yrs for contraception so has 2 more years  If just menstrual control and not birth control then can be left in place as long as has sx control.    Discussed expected Adderall effects and think that this combined with 2 cups of coffee and intermittent fasting which means she is highly likely not hydrated well enough and hasn't eaten much and she is causing this tachycardia as definitely no arrhythmia noted like psvt or afib  However constant tachy is not healthy either so needs to cut out caffeine or the adderall and of course she will work on caffeine, needs to increase protein and nutrient dense food intake and increase hydration and monitor  If persists despite these changes then can send to cards for zio/holter.    Full STD testing done though not HSV as pos for both already.    In the past has had mild bili elevation and calcium elevation so garrett monitor those on today's labs as well    30  minutes in addition to the routine annual preventative exam,  was spent on direct management of the patient's other medical issue(s) as above, including chart review and chart completion, on the same DOS       Estefani Robles MD

## 2024-10-01 LAB
C TRACH DNA SPEC QL NAA+PROBE: NEGATIVE
HPV HR 12 DNA CVX QL NAA+PROBE: POSITIVE
HPV16 DNA CVX QL NAA+PROBE: NEGATIVE
HPV18 DNA CVX QL NAA+PROBE: NEGATIVE
HUMAN PAPILLOMA VIRUS FINAL DIAGNOSIS: ABNORMAL
N GONORRHOEA DNA SPEC QL NAA+PROBE: NEGATIVE
T PALLIDUM AB SER QL: NONREACTIVE

## 2024-10-04 ENCOUNTER — ANCILLARY PROCEDURE (OUTPATIENT)
Dept: MAMMOGRAPHY | Facility: CLINIC | Age: 39
End: 2024-10-04
Payer: COMMERCIAL

## 2024-10-04 DIAGNOSIS — Z12.31 VISIT FOR SCREENING MAMMOGRAM: ICD-10-CM

## 2024-10-04 PROCEDURE — 77067 SCR MAMMO BI INCL CAD: CPT | Mod: TC | Performed by: RADIOLOGY

## 2024-10-04 PROCEDURE — 77063 BREAST TOMOSYNTHESIS BI: CPT | Mod: TC | Performed by: RADIOLOGY

## 2024-10-07 LAB
BKR AP ASSOCIATED HPV REPORT: ABNORMAL
BKR LAB AP GYN ADEQUACY: ABNORMAL
BKR LAB AP GYN INTERPRETATION: ABNORMAL
BKR LAB AP PREVIOUS ABNORMAL: ABNORMAL
PATH REPORT.COMMENTS IMP SPEC: ABNORMAL
PATH REPORT.COMMENTS IMP SPEC: ABNORMAL
PATH REPORT.RELEVANT HX SPEC: ABNORMAL

## 2024-10-08 ENCOUNTER — PATIENT OUTREACH (OUTPATIENT)
Dept: OBGYN | Facility: CLINIC | Age: 39
End: 2024-10-08
Payer: COMMERCIAL

## 2024-10-08 NOTE — LETTER
October 11, 2024      Sigrid Mann  [address] [address] [facility] [address]        Dear ,    We are contacting you in writing regarding your recent Pap smear and Human Papillomavirus (HPV) test because we have been unable to reach you by phone.    Your results showed Low-grade Squamous Intraepithelial Lesion (LSIL) and HPV positive.     HPV is a common virus found in sexually active men and women. There are many strains of HPV, but we test Pap samples for the high-risk types. High-risk HPV can be the cause of an abnormal Pap smear result and can also be a risk factor for later development of cervical cancer if not monitored and/or treated appropriately.    Because of these results, your provider has recommended that you have a colposcopy procedure. A colposcopy procedure allows the provider to more closely examine your vulva, vagina and cervix. If a problem is seen during the colposcopy, a small sample of tissue (biopsy) may be collected for laboratory testing. The results will be complete within two weeks and will be used to determine the plan for future follow-up.     Please call [phone] to schedule this procedure (this cannot be scheduled through [employer]). Schedule this for a time when you are not due to have a period (if having regular menstrual bleeding). No unprotected sex for 2 weeks prior to the colposcopy; a pregnancy test will be requested when you arrive for your appointment. Nothing in the vagina for 24 hours before your colposcopy (no sex, douches, vaginal medications or lubricants). You can take an over-the-counter pain reliever 1 hour before your appointment.    If you have additional questions regarding this result, please contact our Registered Nurse, Hayley, at [phone].      Sincerely,    Your [facility] Care Team

## 2024-11-19 DIAGNOSIS — Z01.812 PRE-PROCEDURE LAB EXAM: Primary | ICD-10-CM

## 2024-11-27 ENCOUNTER — OFFICE VISIT (OUTPATIENT)
Dept: OBGYN | Facility: CLINIC | Age: 39
End: 2024-11-27
Payer: COMMERCIAL

## 2024-11-27 ENCOUNTER — LAB (OUTPATIENT)
Dept: LAB | Facility: CLINIC | Age: 39
End: 2024-11-27
Payer: COMMERCIAL

## 2024-11-27 VITALS — DIASTOLIC BLOOD PRESSURE: 74 MMHG | BODY MASS INDEX: 24.18 KG/M2 | SYSTOLIC BLOOD PRESSURE: 116 MMHG | WEIGHT: 142 LBS

## 2024-11-27 DIAGNOSIS — N84.1 POLYP, CERVIX: ICD-10-CM

## 2024-11-27 DIAGNOSIS — R87.810 CERVICAL HIGH RISK HPV (HUMAN PAPILLOMAVIRUS) TEST POSITIVE: Primary | ICD-10-CM

## 2024-11-27 DIAGNOSIS — R85.612 LGSIL PAP SMEAR OF ANUS: ICD-10-CM

## 2024-11-27 DIAGNOSIS — Z01.812 PRE-PROCEDURE LAB EXAM: ICD-10-CM

## 2024-11-27 LAB — HCG UR QL: NEGATIVE

## 2024-11-27 PROCEDURE — 57454 BX/CURETT OF CERVIX W/SCOPE: CPT | Performed by: OBSTETRICS & GYNECOLOGY

## 2024-11-27 PROCEDURE — 81025 URINE PREGNANCY TEST: CPT

## 2024-11-27 PROCEDURE — 88305 TISSUE EXAM BY PATHOLOGIST: CPT | Performed by: PATHOLOGY

## 2024-11-27 NOTE — PROGRESS NOTES
"INDICATIONS:                                                    Is a pregnancy test required: Yes.  Was it positive or negative?  Negative  Was a consent obtained?  Yes    Today's PHQ-2 Score:       9/30/2024    11:03 AM   PHQ-2 ( 1999 [employer])   Q1: Little interest or pleasure in doing things 0   Q2: Feeling down, depressed or hopeless 0   PHQ-2 Score 0     Today's PHQ-9 Score:        9/27/2023    10:43 AM   PHQ-9 SCORE   PHQ-9 Total Score 1         Sigrid Mann, is a 39 year old female, who had a recent LSIL  pap.  HPV Other positive Yes prior history of abnormal pap. Here today for colposcopy. Discussed indication, risks of infection and bleeding.  12/14/10 LSIL  1/19/11 colpo bx showed inflammation  4/6/12 LSIL, +HR HPV (unknown strain)  4/27/12 colpo bx showed cervicitis and metaplasia  3/1/13 NIL, neg HPV  2015 NIL, neg HPV  2/5/20 NIL, neg HPV  9/27/23 NIL pap, neg HR HPV. Plan 5 year cotest  9/30/24 LSIL pap, + HR HPV (not 16/18) (completed early for abnormal appearing cervix). Plan colp due before 12/30/24  10/8/24 Left msg and [employer] result note sent.  10/11/24 Left message / result letter sent by mail  11/27/24 [place] scheduled  Her last pap was   Lab Results   Component Value Date    GYNINTERP  09/30/2024     Low-grade squamous intraepithelial lesion (LSIL) encompassing HPV/mild dysplasia/CIN1    PAP NIL 02/05/2020    .    PROCEDURE:                                                      Cervix is stained with acetic acid and viewed colposcopically. Squamocolumnar junction {:9024} visualized in it's entirety. {:728} . Biopsy done {YES / NO:179352::\"Yes\"}. Endocervical curretage {:[id number]}    {GO TO THE IMAGE SECTION AND DRAW YOUR COLPO FINDINGS UNDER THE IMAGES, DELETE THIS LINK AND TYPE (DOT) IMAGES TO INSERT THE FINDINGS}     POST PROCEDURE:                                                      IMPRESSION: {:[id number]}    PLAN : Await the results of the biopsies.  She {[facility] Tolerance:380321}. There were no " complications. Patient was discharged in stable condition.    Patient advised to call the clinic if excessive bleeding, pelvic pain, or fever.     Follow-up {[facility] [place] followup :[id number]}.  Sonja Rondon, DO

## 2024-12-02 LAB
PATH REPORT.COMMENTS IMP SPEC: NORMAL
PATH REPORT.COMMENTS IMP SPEC: NORMAL
PATH REPORT.FINAL DX SPEC: NORMAL
PATH REPORT.GROSS SPEC: NORMAL
PATH REPORT.MICROSCOPIC SPEC OTHER STN: NORMAL
PATH REPORT.RELEVANT HX SPEC: NORMAL
PHOTO IMAGE: NORMAL

## 2024-12-03 ENCOUNTER — PATIENT OUTREACH (OUTPATIENT)
Dept: OBGYN | Facility: CLINIC | Age: 39
End: 2024-12-03
Payer: COMMERCIAL

## 2025-07-17 NOTE — TELEPHONE ENCOUNTER
Pt due for annual until 9/2023  Pap Due 2/2025  No other questions or concerns from patient  Will cancel annual appt for tomorrow 4/49/23 and will reschedule for 9/2023  Pt verbalized understanding, in agreement with plan, and voiced no further questions.  Transferred to scheduling  Sruthi Desir RN on 4/18/2023 at 2:05 PM     [High Risk Surgery - Intraperitoneal, Intrathoracic or Supringuinal Vascular Procedures] : High Risk Surgery - Intraperitoneal, Intrathoracic or Supringuinal Vascular Procedures - No (0) [Ischemic Heart Disease] : Ischemic Heart Disease - No (0) [Congestive Heart Failure] : Congestive Heart Failure - No (0) [Prior Cerebrovascular Accident or TIA] : Prior Cerebrovascular Accident or TIA - No (0) [Creatinine >= 2mg/dL (1 Point)] : Creatinine >= 2mg/dL - No (0) [Insulin-dependent Diabetic (1 Point)] : Insulin-dependent Diabetic - No (0) [0] : 0 , RCRI Class: I, Risk of Post-Op Cardiac Complications: 3.9%, 95% CI for Risk Estimate: 2.8% - 5.4% [Patient Optimized for Surgery] : Patient optimized for surgery [No Further Testing Recommended] : no further testing recommended [Modify medications prior to procedure] : Modify medications prior to procedure [[email or web address]] : Hold GLP 1 one week before